# Patient Record
Sex: MALE | Race: WHITE | Employment: OTHER | URBAN - METROPOLITAN AREA
[De-identification: names, ages, dates, MRNs, and addresses within clinical notes are randomized per-mention and may not be internally consistent; named-entity substitution may affect disease eponyms.]

---

## 2017-08-11 ENCOUNTER — HOSPITAL ENCOUNTER (OUTPATIENT)
Dept: GENERAL RADIOLOGY | Age: 67
Discharge: HOME | End: 2017-08-11
Attending: INTERNAL MEDICINE | Admitting: INTERNAL MEDICINE

## 2017-08-11 LAB
ALBUMIN SERPL-MCNC: 4.3 GM/DL (ref 3.4–5)
ALP BLD-CCNC: 43 IU/L (ref 40–129)
ALT SERPL-CCNC: 15 U/L (ref 10–40)
ANION GAP SERPL CALCULATED.3IONS-SCNC: 14 MMOL/L (ref 4–16)
AST SERPL-CCNC: 20 IU/L (ref 15–37)
BACTERIA: NEGATIVE /HPF
BASOPHILS ABSOLUTE: 0 K/CU MM
BASOPHILS RELATIVE PERCENT: 0.4 % (ref 0–1)
BILIRUB SERPL-MCNC: 0.3 MG/DL (ref 0–1)
BILIRUBIN URINE: NEGATIVE MG/DL
BLOOD, URINE: NEGATIVE
BUN BLDV-MCNC: 15 MG/DL (ref 6–23)
CALCIUM SERPL-MCNC: 9.5 MG/DL (ref 8.3–10.6)
CHLORIDE BLD-SCNC: 101 MMOL/L (ref 99–110)
CHOLESTEROL: 209 MG/DL
CLARITY: CLEAR
CO2: 26 MMOL/L (ref 21–32)
COLOR: YELLOW
CREAT SERPL-MCNC: 1.1 MG/DL (ref 0.9–1.3)
DIFFERENTIAL TYPE: ABNORMAL
EOSINOPHILS ABSOLUTE: 0.1 K/CU MM
EOSINOPHILS RELATIVE PERCENT: 1.3 % (ref 0–3)
GFR AFRICAN AMERICAN: >60 ML/MIN/1.73M2
GFR NON-AFRICAN AMERICAN: >60 ML/MIN/1.73M2
GLUCOSE FASTING: 95 MG/DL (ref 70–99)
GLUCOSE, URINE: NEGATIVE MG/DL
HCT VFR BLD CALC: 41.8 % (ref 42–52)
HDLC SERPL-MCNC: 67 MG/DL
HEMOGLOBIN: 14.3 GM/DL (ref 13.5–18)
IMMATURE NEUTROPHIL %: 0.1 % (ref 0–0.43)
KETONES, URINE: NEGATIVE MG/DL
LDL CHOLESTEROL DIRECT: 124 MG/DL
LEUKOCYTE ESTERASE, URINE: NEGATIVE
LYMPHOCYTES ABSOLUTE: 3 K/CU MM
LYMPHOCYTES RELATIVE PERCENT: 42.4 % (ref 24–44)
MCH RBC QN AUTO: 31.9 PG (ref 27–31)
MCHC RBC AUTO-ENTMCNC: 34.2 % (ref 32–36)
MCV RBC AUTO: 93.3 FL (ref 78–100)
MONOCYTES ABSOLUTE: 0.5 K/CU MM
MONOCYTES RELATIVE PERCENT: 6.7 % (ref 0–4)
MUCUS: ABNORMAL HPF
NITRITE URINE, QUANTITATIVE: NEGATIVE
NUCLEATED RBC %: 0 %
PDW BLD-RTO: 13.2 % (ref 11.7–14.9)
PH, URINE: 5 (ref 5–8)
PLATELET # BLD: 194 K/CU MM (ref 140–440)
PMV BLD AUTO: 11.7 FL (ref 7.5–11.1)
POTASSIUM SERPL-SCNC: 4.2 MMOL/L (ref 3.5–5.1)
PROTEIN UA: NEGATIVE MG/DL
RBC # BLD: 4.48 M/CU MM (ref 4.6–6.2)
RBC URINE: <1 /HPF (ref 0–3)
SEGMENTED NEUTROPHILS ABSOLUTE COUNT: 3.5 K/CU MM
SEGMENTED NEUTROPHILS RELATIVE PERCENT: 49.1 % (ref 36–66)
SODIUM BLD-SCNC: 141 MMOL/L (ref 135–145)
SPECIFIC GRAVITY UA: 1.02 (ref 1–1.03)
T3 FREE: 2.8 PG/ML (ref 2.3–4.2)
T4 FREE: 1.15 NG/DL (ref 0.9–1.8)
TOTAL IMMATURE NEUTOROPHIL: 0.01 K/CU MM
TOTAL NUCLEATED RBC: 0 K/CU MM
TOTAL PROTEIN: 6.6 GM/DL (ref 6.4–8.2)
TRIGL SERPL-MCNC: 222 MG/DL
UROBILINOGEN, URINE: NORMAL MG/DL (ref 0.2–1)
WBC # BLD: 7.1 K/CU MM (ref 4–10.5)
WBC UA: <1 /HPF (ref 0–2)

## 2020-03-06 ENCOUNTER — APPOINTMENT (OUTPATIENT)
Dept: GENERAL RADIOLOGY | Age: 70
DRG: 286 | End: 2020-03-06
Payer: MEDICARE

## 2020-03-06 ENCOUNTER — APPOINTMENT (OUTPATIENT)
Dept: ULTRASOUND IMAGING | Age: 70
DRG: 286 | End: 2020-03-06
Payer: MEDICARE

## 2020-03-06 ENCOUNTER — APPOINTMENT (OUTPATIENT)
Dept: CT IMAGING | Age: 70
DRG: 286 | End: 2020-03-06
Payer: MEDICARE

## 2020-03-06 ENCOUNTER — HOSPITAL ENCOUNTER (INPATIENT)
Age: 70
LOS: 5 days | Discharge: HOME | DRG: 286 | End: 2020-03-11
Attending: EMERGENCY MEDICINE | Admitting: INTERNAL MEDICINE
Payer: MEDICARE

## 2020-03-06 PROBLEM — I48.91 ATRIAL FIBRILLATION WITH RVR (HCC): Status: ACTIVE | Noted: 2020-03-06

## 2020-03-06 LAB
ALBUMIN SERPL-MCNC: 3.8 GM/DL (ref 3.4–5)
ALP BLD-CCNC: 56 IU/L (ref 40–129)
ALT SERPL-CCNC: 48 U/L (ref 10–40)
ANION GAP SERPL CALCULATED.3IONS-SCNC: 16 MMOL/L (ref 4–16)
AST SERPL-CCNC: 55 IU/L (ref 15–37)
BACTERIA: ABNORMAL /HPF
BASOPHILS ABSOLUTE: 0 K/CU MM
BASOPHILS ABSOLUTE: 0 K/CU MM
BASOPHILS RELATIVE PERCENT: 0.2 % (ref 0–1)
BASOPHILS RELATIVE PERCENT: 0.3 % (ref 0–1)
BILIRUB SERPL-MCNC: 1.2 MG/DL (ref 0–1)
BILIRUBIN URINE: NEGATIVE MG/DL
BLOOD, URINE: ABNORMAL
BUN BLDV-MCNC: 37 MG/DL (ref 6–23)
CALCIUM SERPL-MCNC: 9.3 MG/DL (ref 8.3–10.6)
CAST TYPE: ABNORMAL /HPF
CHLORIDE BLD-SCNC: 101 MMOL/L (ref 99–110)
CLARITY: CLEAR
CO2: 21 MMOL/L (ref 21–32)
COLOR: ABNORMAL
COMMENT UA: ABNORMAL
CREAT SERPL-MCNC: 1.4 MG/DL (ref 0.9–1.3)
CRYSTAL TYPE: ABNORMAL /HPF
DIFFERENTIAL TYPE: ABNORMAL
DIFFERENTIAL TYPE: ABNORMAL
EOSINOPHILS ABSOLUTE: 0 K/CU MM
EOSINOPHILS ABSOLUTE: 0 K/CU MM
EOSINOPHILS RELATIVE PERCENT: 0 % (ref 0–3)
EOSINOPHILS RELATIVE PERCENT: 0 % (ref 0–3)
EPITHELIAL CELLS, UA: 3 /HPF
GFR AFRICAN AMERICAN: >60 ML/MIN/1.73M2
GFR NON-AFRICAN AMERICAN: 50 ML/MIN/1.73M2
GLUCOSE BLD-MCNC: 112 MG/DL (ref 70–99)
GLUCOSE, URINE: NEGATIVE MG/DL
HCT VFR BLD CALC: 47.1 % (ref 42–52)
HCT VFR BLD CALC: 47.2 % (ref 42–52)
HEMOGLOBIN: 15 GM/DL (ref 13.5–18)
HEMOGLOBIN: 15.5 GM/DL (ref 13.5–18)
IMMATURE NEUTROPHIL %: 0.2 % (ref 0–0.43)
IMMATURE NEUTROPHIL %: 0.3 % (ref 0–0.43)
INR BLD: 1.46 INDEX
KETONES, URINE: NEGATIVE MG/DL
LEUKOCYTE ESTERASE, URINE: NEGATIVE
LIPASE: 38 IU/L (ref 13–60)
LYMPHOCYTES ABSOLUTE: 1.6 K/CU MM
LYMPHOCYTES ABSOLUTE: 1.7 K/CU MM
LYMPHOCYTES RELATIVE PERCENT: 15.4 % (ref 24–44)
LYMPHOCYTES RELATIVE PERCENT: 17.9 % (ref 24–44)
MCH RBC QN AUTO: 30.1 PG (ref 27–31)
MCH RBC QN AUTO: 30.3 PG (ref 27–31)
MCHC RBC AUTO-ENTMCNC: 31.8 % (ref 32–36)
MCHC RBC AUTO-ENTMCNC: 32.8 % (ref 32–36)
MCV RBC AUTO: 91.7 FL (ref 78–100)
MCV RBC AUTO: 95.2 FL (ref 78–100)
MONOCYTES ABSOLUTE: 0.7 K/CU MM
MONOCYTES ABSOLUTE: 0.7 K/CU MM
MONOCYTES RELATIVE PERCENT: 7.1 % (ref 0–4)
MONOCYTES RELATIVE PERCENT: 7.6 % (ref 0–4)
NITRITE URINE, QUANTITATIVE: NEGATIVE
NUCLEATED RBC %: 0 %
PDW BLD-RTO: 15.1 % (ref 11.7–14.9)
PDW BLD-RTO: 15.1 % (ref 11.7–14.9)
PH, URINE: 6 (ref 5–8)
PLATELET # BLD: 156 K/CU MM (ref 140–440)
PLATELET # BLD: 161 K/CU MM (ref 140–440)
PMV BLD AUTO: 12.1 FL (ref 7.5–11.1)
PMV BLD AUTO: 12.5 FL (ref 7.5–11.1)
POTASSIUM SERPL-SCNC: 4.6 MMOL/L (ref 3.5–5.1)
PRO-BNP: ABNORMAL PG/ML
PROTEIN UA: 300 MG/DL
PROTHROMBIN TIME: 17.7 SECONDS (ref 11.7–14.5)
RBC # BLD: 4.95 M/CU MM (ref 4.6–6.2)
RBC # BLD: 5.15 M/CU MM (ref 4.6–6.2)
RBC URINE: 20 /HPF (ref 0–3)
SEGMENTED NEUTROPHILS ABSOLUTE COUNT: 7 K/CU MM
SEGMENTED NEUTROPHILS ABSOLUTE COUNT: 8 K/CU MM
SEGMENTED NEUTROPHILS RELATIVE PERCENT: 74.1 % (ref 36–66)
SEGMENTED NEUTROPHILS RELATIVE PERCENT: 76.9 % (ref 36–66)
SODIUM BLD-SCNC: 138 MMOL/L (ref 135–145)
SPECIFIC GRAVITY UA: 1.03 (ref 1–1.03)
TOTAL IMMATURE NEUTOROPHIL: 0.02 K/CU MM
TOTAL IMMATURE NEUTOROPHIL: 0.03 K/CU MM
TOTAL NUCLEATED RBC: 0 K/CU MM
TOTAL PROTEIN: 6.5 GM/DL (ref 6.4–8.2)
TROPONIN T: <0.01 NG/ML
TROPONIN T: <0.01 NG/ML
UROBILINOGEN, URINE: 0.2 MG/DL (ref 0.2–1)
WBC # BLD: 10.5 K/CU MM (ref 4–10.5)
WBC # BLD: 9.4 K/CU MM (ref 4–10.5)
WBC UA: 5 /HPF (ref 0–2)

## 2020-03-06 PROCEDURE — 80048 BASIC METABOLIC PNL TOTAL CA: CPT

## 2020-03-06 PROCEDURE — 2500000003 HC RX 250 WO HCPCS: Performed by: EMERGENCY MEDICINE

## 2020-03-06 PROCEDURE — 2500000003 HC RX 250 WO HCPCS: Performed by: INTERNAL MEDICINE

## 2020-03-06 PROCEDURE — 80053 COMPREHEN METABOLIC PANEL: CPT

## 2020-03-06 PROCEDURE — 83880 ASSAY OF NATRIURETIC PEPTIDE: CPT

## 2020-03-06 PROCEDURE — 6360000004 HC RX CONTRAST MEDICATION: Performed by: EMERGENCY MEDICINE

## 2020-03-06 PROCEDURE — 93005 ELECTROCARDIOGRAM TRACING: CPT | Performed by: EMERGENCY MEDICINE

## 2020-03-06 PROCEDURE — 96376 TX/PRO/DX INJ SAME DRUG ADON: CPT

## 2020-03-06 PROCEDURE — 84484 ASSAY OF TROPONIN QUANT: CPT

## 2020-03-06 PROCEDURE — 85025 COMPLETE CBC W/AUTO DIFF WBC: CPT

## 2020-03-06 PROCEDURE — 6360000002 HC RX W HCPCS: Performed by: EMERGENCY MEDICINE

## 2020-03-06 PROCEDURE — 83690 ASSAY OF LIPASE: CPT

## 2020-03-06 PROCEDURE — 71045 X-RAY EXAM CHEST 1 VIEW: CPT

## 2020-03-06 PROCEDURE — 96365 THER/PROPH/DIAG IV INF INIT: CPT

## 2020-03-06 PROCEDURE — 85610 PROTHROMBIN TIME: CPT

## 2020-03-06 PROCEDURE — 93970 EXTREMITY STUDY: CPT

## 2020-03-06 PROCEDURE — 96375 TX/PRO/DX INJ NEW DRUG ADDON: CPT

## 2020-03-06 PROCEDURE — 2140000000 HC CCU INTERMEDIATE R&B

## 2020-03-06 PROCEDURE — 81001 URINALYSIS AUTO W/SCOPE: CPT

## 2020-03-06 PROCEDURE — 6370000000 HC RX 637 (ALT 250 FOR IP): Performed by: INTERNAL MEDICINE

## 2020-03-06 PROCEDURE — 2580000003 HC RX 258: Performed by: EMERGENCY MEDICINE

## 2020-03-06 PROCEDURE — 99285 EMERGENCY DEPT VISIT HI MDM: CPT

## 2020-03-06 PROCEDURE — 83735 ASSAY OF MAGNESIUM: CPT

## 2020-03-06 PROCEDURE — 84443 ASSAY THYROID STIM HORMONE: CPT

## 2020-03-06 PROCEDURE — 2580000003 HC RX 258: Performed by: INTERNAL MEDICINE

## 2020-03-06 PROCEDURE — 6360000002 HC RX W HCPCS: Performed by: INTERNAL MEDICINE

## 2020-03-06 PROCEDURE — 71275 CT ANGIOGRAPHY CHEST: CPT

## 2020-03-06 RX ORDER — DILTIAZEM HYDROCHLORIDE 5 MG/ML
10 INJECTION INTRAVENOUS ONCE
Status: COMPLETED | OUTPATIENT
Start: 2020-03-06 | End: 2020-03-06

## 2020-03-06 RX ORDER — POLYETHYLENE GLYCOL 3350 17 G/17G
17 POWDER, FOR SOLUTION ORAL DAILY
Status: DISCONTINUED | OUTPATIENT
Start: 2020-03-07 | End: 2020-03-11 | Stop reason: HOSPADM

## 2020-03-06 RX ORDER — FUROSEMIDE 10 MG/ML
INJECTION INTRAMUSCULAR; INTRAVENOUS
Status: DISPENSED
Start: 2020-03-06 | End: 2020-03-07

## 2020-03-06 RX ORDER — ACETAMINOPHEN 325 MG/1
650 TABLET ORAL EVERY 6 HOURS PRN
Status: DISCONTINUED | OUTPATIENT
Start: 2020-03-06 | End: 2020-03-11 | Stop reason: HOSPADM

## 2020-03-06 RX ORDER — POLYETHYLENE GLYCOL 3350 17 G/17G
17 POWDER, FOR SOLUTION ORAL DAILY PRN
Status: DISCONTINUED | OUTPATIENT
Start: 2020-03-06 | End: 2020-03-11 | Stop reason: HOSPADM

## 2020-03-06 RX ORDER — TAMSULOSIN HYDROCHLORIDE 0.4 MG/1
0.4 CAPSULE ORAL DAILY
Status: DISCONTINUED | OUTPATIENT
Start: 2020-03-07 | End: 2020-03-11 | Stop reason: HOSPADM

## 2020-03-06 RX ORDER — BISACODYL 10 MG
10 SUPPOSITORY, RECTAL RECTAL DAILY PRN
Status: DISCONTINUED | OUTPATIENT
Start: 2020-03-06 | End: 2020-03-11 | Stop reason: HOSPADM

## 2020-03-06 RX ORDER — 0.9 % SODIUM CHLORIDE 0.9 %
500 INTRAVENOUS SOLUTION INTRAVENOUS ONCE
Status: COMPLETED | OUTPATIENT
Start: 2020-03-06 | End: 2020-03-06

## 2020-03-06 RX ORDER — FUROSEMIDE 10 MG/ML
40 INJECTION INTRAMUSCULAR; INTRAVENOUS ONCE
Status: COMPLETED | OUTPATIENT
Start: 2020-03-06 | End: 2020-03-06

## 2020-03-06 RX ORDER — SODIUM CHLORIDE 0.9 % (FLUSH) 0.9 %
10 SYRINGE (ML) INJECTION EVERY 12 HOURS SCHEDULED
Status: DISCONTINUED | OUTPATIENT
Start: 2020-03-06 | End: 2020-03-11 | Stop reason: HOSPADM

## 2020-03-06 RX ORDER — FUROSEMIDE 10 MG/ML
40 INJECTION INTRAMUSCULAR; INTRAVENOUS EVERY 12 HOURS
Status: DISCONTINUED | OUTPATIENT
Start: 2020-03-06 | End: 2020-03-07

## 2020-03-06 RX ORDER — ONDANSETRON 2 MG/ML
4 INJECTION INTRAMUSCULAR; INTRAVENOUS EVERY 6 HOURS PRN
Status: DISCONTINUED | OUTPATIENT
Start: 2020-03-06 | End: 2020-03-11 | Stop reason: HOSPADM

## 2020-03-06 RX ORDER — PROMETHAZINE HYDROCHLORIDE 25 MG/1
12.5 TABLET ORAL EVERY 6 HOURS PRN
Status: DISCONTINUED | OUTPATIENT
Start: 2020-03-06 | End: 2020-03-11 | Stop reason: HOSPADM

## 2020-03-06 RX ORDER — LACTULOSE 10 G/15ML
30 SOLUTION ORAL
Status: COMPLETED | OUTPATIENT
Start: 2020-03-06 | End: 2020-03-07

## 2020-03-06 RX ORDER — SODIUM CHLORIDE 0.9 % (FLUSH) 0.9 %
10 SYRINGE (ML) INJECTION PRN
Status: DISCONTINUED | OUTPATIENT
Start: 2020-03-06 | End: 2020-03-11 | Stop reason: HOSPADM

## 2020-03-06 RX ORDER — ACETAMINOPHEN 650 MG/1
650 SUPPOSITORY RECTAL EVERY 6 HOURS PRN
Status: DISCONTINUED | OUTPATIENT
Start: 2020-03-06 | End: 2020-03-11 | Stop reason: HOSPADM

## 2020-03-06 RX ORDER — PANTOPRAZOLE SODIUM 40 MG/10ML
40 INJECTION, POWDER, LYOPHILIZED, FOR SOLUTION INTRAVENOUS DAILY
Status: DISCONTINUED | OUTPATIENT
Start: 2020-03-07 | End: 2020-03-11 | Stop reason: HOSPADM

## 2020-03-06 RX ADMIN — LACTULOSE 30 G: 10 SOLUTION ORAL at 22:53

## 2020-03-06 RX ADMIN — SODIUM CHLORIDE, PRESERVATIVE FREE 10 ML: 5 INJECTION INTRAVENOUS at 22:28

## 2020-03-06 RX ADMIN — DEXTROSE MONOHYDRATE 5 MG/HR: 50 INJECTION, SOLUTION INTRAVENOUS at 22:28

## 2020-03-06 RX ADMIN — DILTIAZEM HYDROCHLORIDE 10 MG: 5 INJECTION INTRAVENOUS at 17:46

## 2020-03-06 RX ADMIN — DILTIAZEM HYDROCHLORIDE 10 MG: 5 INJECTION INTRAVENOUS at 16:33

## 2020-03-06 RX ADMIN — ENOXAPARIN SODIUM 100 MG: 100 INJECTION SUBCUTANEOUS at 22:28

## 2020-03-06 RX ADMIN — FUROSEMIDE 40 MG: 10 INJECTION, SOLUTION INTRAMUSCULAR; INTRAVENOUS at 17:40

## 2020-03-06 RX ADMIN — FUROSEMIDE 40 MG: 10 INJECTION, SOLUTION INTRAMUSCULAR; INTRAVENOUS at 22:31

## 2020-03-06 RX ADMIN — SODIUM CHLORIDE 500 ML: 9 INJECTION, SOLUTION INTRAVENOUS at 16:53

## 2020-03-06 RX ADMIN — IOPAMIDOL 100 ML: 755 INJECTION, SOLUTION INTRAVENOUS at 17:23

## 2020-03-06 RX ADMIN — CEFTRIAXONE 1 G: 1 INJECTION, POWDER, FOR SOLUTION INTRAMUSCULAR; INTRAVENOUS at 17:43

## 2020-03-06 NOTE — ED PROVIDER NOTES
Triage Chief Complaint:   Leg Swelling (bilat )      [place]:  Yulissa Cao is a 78 y.o. male that presents for:    Chief complaint:  Lower extremity swelling    Location:  BiLateral    Quality/Characteristic:  Pitting. Duration:  Aggressive over the past 2 weeks    Aggravating/Alleviating factors:  4 days ago saw his physician assistant and was started on BPH medication. No history of prior DVT, PE. No history of acute renal failure or chronic kidney disease. Associated symptoms:  Has chest pain, shortness of breath, palpitations. Severity:  No pain at the current time. Review of medical records:  No old records for review. ROS:    Constitutional: No fevers/chills. No weight changes. No night sweats. Eyes:  No blurry vision or double vision. No drainage. Ears, Nose, Throat:  No hearing changes. No rhinitis. No sore throat or cough. Cardiac: + Chest pain. No arm/jaw pain. + palpitations. No lightheadedness. No claudication symptoms. Respiratory: + Short of breath. No hemoptysis. GI: No abdominal pain. No n/v/d. No hematochezia or melena. :  + Decreased urine output as above. MSK:  No joint pain or swelling.  + Bilateral lower extremity swelling. Skin:  No rashes. No pruritis. Neuro:  No numbness, tingling or weakness in any extremity or face. No headache. No incoordination. No speech difficulties. No seizures. At least 10 point systems reviewed and otherwise acutely negative except as in the [address] Past Medical History:   Diagnosis Date    Enlarged prostate      Past Surgical History:   Procedure Laterality Date    ARM SURGERY       History reviewed. No pertinent family history.   Social History     Socioeconomic History    Marital status:      Spouse name: Not on file    Number of children: Not on file    Years of education: Not on file    Highest education level: Not on file   Occupational History    Not on file   Social Needs    Financial supple, no neck tenderness, normal ROM, no JVD  BACK: no back tenderness. no CVA tenderness  LUNGS: no wheezing, no rales, no rhonchi, no accessory muscle use. good air exchange bilateral.  HEART: Tachycardic and irregularly irregular rate on exam and also on monitor, no murmur, no rub. ABDOMEN: normal appearance, normal BS, soft, no abd tenderness, no guarding, no organomegaly, however the patient does have bladder fullness. RECTAL: deffered  EXTREMITIES: no joint swelling\tenderness, normal ROM. Lateral pitting edema from the proximal third of the leg distally. He has strong DP and PT pulses which are symmetric, normal capillary refill. Compartments are soft that his legs are warm to touch. SKIN: warm, dry, good color, no rash. NEURO: Alert and oriented, motor intact, sensory intact.     I have reviewed and interpreted all of the currently available lab results from this visit (if applicable):  Results for orders placed or performed during the hospital encounter of 03/06/20   CBC Auto Differential   Result Value Ref Range    WBC 9.4 4.0 - 10.5 K/CU MM    RBC 5.15 4.6 - 6.2 M/CU MM    Hemoglobin 15.5 13.5 - 18.0 GM/DL    Hematocrit 47.2 42 - 52 %    MCV 91.7 78 - 100 FL    MCH 30.1 27 - 31 PG    MCHC 32.8 32.0 - 36.0 %    RDW 15.1 (H) 11.7 - 14.9 %    Platelets 776 574 - 487 K/CU MM    MPV 12.1 (H) 7.5 - 11.1 FL    Differential Type AUTOMATED DIFFERENTIAL     Segs Relative 74.1 (H) 36 - 66 %    Lymphocytes % 17.9 (L) 24 - 44 %    Monocytes % 7.6 (H) 0 - 4 %    Eosinophils % 0.0 0 - 3 %    Basophils % 0.2 0 - 1 %    Segs Absolute 7.0 K/CU MM    Lymphocytes Absolute 1.7 K/CU MM    Monocytes Absolute 0.7 K/CU MM    Eosinophils Absolute 0.0 K/CU MM    Basophils Absolute 0.0 K/CU MM    Immature Neutrophil % 0.2 0 - 0.43 %    Total Immature Neutrophil 0.02 K/CU MM   Comprehensive Metabolic Panel w/ Reflex to MG   Result Value Ref Range    Sodium 138 135 - 145 MMOL/L    Potassium 4.6 3.5 - 5.1 MMOL/L    Chloride 101 99 - 110 mMol/L    CO2 21 21 - 32 MMOL/L    BUN 37 (H) 6 - 23 MG/DL    CREATININE 1.4 (H) 0.9 - 1.3 MG/DL    Glucose 112 (H) 70 - 99 MG/DL    Calcium 9.3 8.3 - 10.6 MG/DL    Alb 3.8 3.4 - 5.0 GM/DL    Total Protein 6.5 6.4 - 8.2 GM/DL    Total Bilirubin 1.2 (H) 0.0 - 1.0 MG/DL    ALT 48 (H) 10 - 40 U/L    AST 55 (H) 15 - 37 IU/L    Alkaline Phosphatase 56 40 - 129 IU/L    GFR Non- 50 (L) >60 mL/min/1.73m2    GFR African American >60 >60 mL/min/1.73m2    Anion Gap 16 4 - 16   Lipase   Result Value Ref Range    Lipase 38 13 - 60 IU/L   Troponin   Result Value Ref Range    Troponin T <0.010 <0.01 NG/ML   Urinalysis, reflex to microscopic   Result Value Ref Range    Color, UA DARK YELLOW (A) YELLOW    Clarity, UA CLEAR CLEAR    Glucose, Urine NEGATIVE NEGATIVE MG/DL    Bilirubin Urine NEGATIVE NEGATIVE MG/DL    Ketones, Urine NEGATIVE NEGATIVE MG/DL    Specific Gravity, UA 1.030 1.001 - 1.035    Blood, Urine LARGE NUMBER OR AMOUNT OBSERVED (A) NEGATIVE    pH, Urine 6.0 5.0 - 8.0    Protein,  (HH) NEGATIVE MG/DL    Urobilinogen, Urine 0.2 0.2 - 1.0 MG/DL    Nitrite Urine, Quantitative NEGATIVE NEGATIVE    Leukocyte Esterase, Urine NEGATIVE NEGATIVE    RBC, UA 20 (H) 0 - 3 /HPF    WBC, UA 5 (H) 0 - 2 /HPF    Epithelial Cells, UA 3 /HPF    Cast Type NO CAST FORMS SEEN NO CAST FORMS SEEN /HPF    Bacteria, UA MODERATE NUMBER OR AMOUNT OBSERVED (A) NEGATIVE /HPF    Crystal Type NO CELLS SEEN (A) NEGATIVE /HPF    Urinalysis Comments MUCUS PRESENT    Brain Natriuretic Peptide   Result Value Ref Range    Pro-BNP 10,332 (H) <300 PG/ML        Radiographs:  [] Radiologist's Report Reviewed:   VL DUP LOWER EXTREMITY VENOUS BILATERAL   Final Result   No evidence of DVT in either lower extremity. CTA PULMONARY W CONTRAST   Final Result   No evidence of pulmonary embolism or acute pulmonary abnormality.       Bilateral pleural effusions more prominent on the right than on the left with   overlying atelectatic changes. XR CHEST PORTABLE   Final Result   1. Bilateral pleural effusions right greater than left with associated   airspace disease. Follow-up PA and lateral chest x-ray to resolution is   recommended. 2. Cardiomegaly without overt failure. EKG: (All EKG's are interpreted by myself in the absence of a cardiologist)  EKG INTERPRETATION:  I have personally reviewed and interpreted this EKG independently and find it to reveal:  12 lead EKG, irregularly irregular tachyarrhythmia, Rate 156, , no STEMI. No old for comparison. MDM:    4:12 PM as noted above in the physical exam the patient is found to be in atrial fibrillation and rapid ventricular rate, he has bilateral pitting edema, he has bladder fullness I will perform a bedside bladder scan, he is active chest pain and shortness of breath, he will need continuous cardio dynamic monitoring. Patient will be moved to resuscitation room immediately adjacent to the provider desk. We will obtain stat labs to include a troponin as well as BNP. Given the duration of the patient's symptoms certainly both DVT, and PE are within the differential, as well as acute renal failure, and cardiac ischemia. Will plan for transfer once stabilized. 4:24 PM Mendiola has been placed. Bedside ultrasound revealed bladder volume greater than 1000 mL. Please see procedure note as above. EKG is atrial fibrillation and RVR as dictated above. I have asked the imaging tech to call in the ultrasonographer for stat bilateral duplex ultrasounds. I will also slight the patient for a CTA of the chest however will need to evaluate his renal function prior. 4:40 PM repeat BP after first 10 mg push of diltiazem is 114/62. Heart rate is still labile however is downtrending, has gone down to 114. Patient tolerated well without any immediate complications or side effects.   I did asked that the x-ray tech come emergently to the patient's room to complete the chest x-ray. 4:46 PM GFR 50, likely secondary to obstructive uropathy and therefore the Mendiola has been placed, more than a liter has come out, will bolus the patient with 500 mL of normal saline given he will be slated for CT of the chest with contrast.  X-ray tech at bedside. Heart rate still labile however continues to be downtrending, now 110.    5:31 PM troponin is negative, BNP is positive to the thousands, Lasix ordered IV, also the patient has a moderate amount of bacteria in his urine, given this was a Mendiola specimen and that he has been retaining we will treat him with ceftriaxone and also send this for culture. We are awaiting the CTA results, the patient will be slated for admission. I spoken to the patient about his facility of choice, he preferred to go to [facility].  We will also dose a second diltiazem 10 mg.    5:45 PM Dipak Castillo RN at the [facility] has taken report. Awaiting hospitalist callback. 6:28 PM no PE, no DVT. Patient has sustained a rate controlled state around 100, still somewhat labile. This was all discussed with the hospitalist who does agree that the patient is appropriate for inpatient status with telemetry to the PCU. A bed is available and has been assigned. Awaiting transfer. ------------------------------------------------        CRITICAL CARE TIME: 46 minutes of critical care time was provided to this patient. Emergent measures were taken to aggressively manage the potential of multi--end organ damage secondary to atrial fibrillation and rapid ventricular rate, congestive heart failure. Time includes delineation of circumstances and medical past history from collateral history providers, arranging for admission and consultation by subspecialty providers, including hospitalist who is accepted admission and recommends inpatient status to PCU with telemetry.       Total critical care time documented does not include time spent on separately billed procedures. Final Impression:  1. Bilateral leg edema    2. Shortness of breath    3. Atrial fibrillation, unspecified type [facility])        Discharge referral (if applicable):  Nick Davenport MD  [address] [facility]  [phone]            Discharge medications (if applicable):  New Prescriptions    No medications on file       (Please note that portions of this note may have been completed with a voice recognition program. Efforts were made to edit the dictations but occasionally words are mis-transcribed.)    MD Maricruz Ramirez MD  03/09/20 [id number]

## 2020-03-07 LAB
ANION GAP SERPL CALCULATED.3IONS-SCNC: 16 MMOL/L (ref 4–16)
BUN BLDV-MCNC: 33 MG/DL (ref 6–23)
CALCIUM SERPL-MCNC: 8.7 MG/DL (ref 8.3–10.6)
CHLORIDE BLD-SCNC: 100 MMOL/L (ref 99–110)
CO2: 21 MMOL/L (ref 21–32)
CREAT SERPL-MCNC: 1.4 MG/DL (ref 0.9–1.3)
CREATININE URINE: 22.2 MG/DL (ref 39–259)
GFR AFRICAN AMERICAN: >60 ML/MIN/1.73M2
GFR NON-AFRICAN AMERICAN: 50 ML/MIN/1.73M2
GLUCOSE BLD-MCNC: 154 MG/DL (ref 70–99)
MAGNESIUM: 2 MG/DL (ref 1.8–2.4)
POTASSIUM SERPL-SCNC: 4.3 MMOL/L (ref 3.5–5.1)
PROT/CREAT RATIO, UR: ABNORMAL
SODIUM BLD-SCNC: 137 MMOL/L (ref 135–145)
T4 FREE: 1.5 NG/DL (ref 0.9–1.8)
TROPONIN T: 0.01 NG/ML
TSH HIGH SENSITIVITY: 4.89 UIU/ML (ref 0.27–4.2)
URINE TOTAL PROTEIN: 32.1 MG/DL

## 2020-03-07 PROCEDURE — 99222 1ST HOSP IP/OBS MODERATE 55: CPT | Performed by: INTERNAL MEDICINE

## 2020-03-07 PROCEDURE — 6370000000 HC RX 637 (ALT 250 FOR IP): Performed by: INTERNAL MEDICINE

## 2020-03-07 PROCEDURE — 84439 ASSAY OF FREE THYROXINE: CPT

## 2020-03-07 PROCEDURE — 82570 ASSAY OF URINE CREATININE: CPT

## 2020-03-07 PROCEDURE — 6360000002 HC RX W HCPCS: Performed by: INTERNAL MEDICINE

## 2020-03-07 PROCEDURE — 36415 COLL VENOUS BLD VENIPUNCTURE: CPT

## 2020-03-07 PROCEDURE — 87086 URINE CULTURE/COLONY COUNT: CPT

## 2020-03-07 PROCEDURE — 2140000000 HC CCU INTERMEDIATE R&B

## 2020-03-07 PROCEDURE — 84484 ASSAY OF TROPONIN QUANT: CPT

## 2020-03-07 PROCEDURE — 2580000003 HC RX 258: Performed by: INTERNAL MEDICINE

## 2020-03-07 PROCEDURE — 84156 ASSAY OF PROTEIN URINE: CPT

## 2020-03-07 PROCEDURE — C9113 INJ PANTOPRAZOLE SODIUM, VIA: HCPCS | Performed by: INTERNAL MEDICINE

## 2020-03-07 RX ORDER — FUROSEMIDE 10 MG/ML
20 INJECTION INTRAMUSCULAR; INTRAVENOUS ONCE
Status: COMPLETED | OUTPATIENT
Start: 2020-03-07 | End: 2020-03-07

## 2020-03-07 RX ORDER — SENNA PLUS 8.6 MG/1
1 TABLET ORAL NIGHTLY PRN
Status: DISCONTINUED | OUTPATIENT
Start: 2020-03-07 | End: 2020-03-11 | Stop reason: HOSPADM

## 2020-03-07 RX ORDER — FUROSEMIDE 10 MG/ML
20 INJECTION INTRAMUSCULAR; INTRAVENOUS EVERY 12 HOURS
Status: DISCONTINUED | OUTPATIENT
Start: 2020-03-07 | End: 2020-03-08

## 2020-03-07 RX ORDER — DIGOXIN 0.25 MG/ML
500 INJECTION INTRAMUSCULAR; INTRAVENOUS ONCE
Status: COMPLETED | OUTPATIENT
Start: 2020-03-07 | End: 2020-03-07

## 2020-03-07 RX ADMIN — ENOXAPARIN SODIUM 100 MG: 100 INJECTION SUBCUTANEOUS at 08:49

## 2020-03-07 RX ADMIN — FUROSEMIDE 20 MG: 10 INJECTION, SOLUTION INTRAMUSCULAR; INTRAVENOUS at 15:30

## 2020-03-07 RX ADMIN — FUROSEMIDE 40 MG: 10 INJECTION, SOLUTION INTRAMUSCULAR; INTRAVENOUS at 10:44

## 2020-03-07 RX ADMIN — LACTULOSE 30 G: 10 SOLUTION ORAL at 02:48

## 2020-03-07 RX ADMIN — TAMSULOSIN HYDROCHLORIDE 0.4 MG: 0.4 CAPSULE ORAL at 08:49

## 2020-03-07 RX ADMIN — ENOXAPARIN SODIUM 100 MG: 100 INJECTION SUBCUTANEOUS at 22:18

## 2020-03-07 RX ADMIN — DIGOXIN 500 MCG: 0.25 INJECTION INTRAMUSCULAR; INTRAVENOUS at 15:30

## 2020-03-07 RX ADMIN — PANTOPRAZOLE SODIUM 40 MG: 40 INJECTION, POWDER, FOR SOLUTION INTRAVENOUS at 08:49

## 2020-03-07 RX ADMIN — SENNOSIDES 8.6 MG: 8.6 TABLET, FILM COATED ORAL at 23:59

## 2020-03-07 RX ADMIN — DILTIAZEM HYDROCHLORIDE 30 MG: 30 TABLET, FILM COATED ORAL at 15:29

## 2020-03-07 RX ADMIN — SODIUM CHLORIDE, PRESERVATIVE FREE 10 ML: 5 INJECTION INTRAVENOUS at 08:49

## 2020-03-07 RX ADMIN — SODIUM CHLORIDE, PRESERVATIVE FREE 10 ML: 5 INJECTION INTRAVENOUS at 22:18

## 2020-03-07 NOTE — PROGRESS NOTES
Hospitalist Progress Note      PCP: JOÃO Tanner    Date of Admission: 3/6/2020    Chief Complaint on Admission: SSOB, urinary retention, ISIDRO, constipation    Pt Seen/Examined and Chart Reviewed. Admitting dx a fib with RVR, acute urinary retention, constipation    SUBJECTIVE:     Moving bowels, tolerating PO, continued mild ISIDRO but improved, no chest pain, arevalo in place      OBJECTIVE:   Vitals    TEMPERATURE:  Current - Temp: 97.8 °F (36.6 °C); Max - Temp  Av.3 °F (36.8 °C)  Min: 97.8 °F (36.6 °C)  Max: 98.7 °F (37.1 °C)  RESPIRATIONS RANGE: Resp  Av  Min: 11  Max: 25  PULSE RANGE: Pulse  Av.7  Min: 89  Max: 131  BLOOD PRESSURE RANGE:  Systolic (39ZDO), TDC:803 , Min:88 , ROJ:672   ; Diastolic (35XCZ), WEB:76, Min:67, Max:93    PULSE OXIMETRY RANGE: SpO2  Av.9 %  Min: 90 %  Max: 98 %  24HR INTAKE/OUTPUT:      Intake/Output Summary (Last 24 hours) at 3/7/2020 1648  Last data filed at 3/7/2020 1340  Gross per 24 hour   Intake 1477.67 ml   Output 5000 ml   Net -3522.33 ml       Exam:    General appearance: Well, no apparent distress, appears stated age and cooperative. HEENT Normal cephalic, atraumatic without obvious deformity. Pupils equal, round, and reactive to light. Extra ocular muscles intact. Conjunctivae/corneas clear. Neck: Supple, No jugular venous distention/bruits. Trachea midline without thyromegaly or adenopathy with full range of motion. Lungs: Clear to ascultation, bilaterally without Rales/Wheezes/Rhonchi with good respiratory effort. Heart: irregularly irregular with Normal S1/S2 without  murmurs, rubs or gallops, point of maximum impulse non-displaced  Abdomen: Soft, non-tender or non-distended without rigidity or guarding and positive bowel sounds all four quadrants. Extremities: No clubbing, cyanosis, or edema bilaterally. Full range of motion without deformity and normal gait intact.   Skin: Skin color, texture, turgor normal. No rashes or

## 2020-03-07 NOTE — CONSULTS
Department of Urology   Consult Note  [facility] 1 2 3 4 5      Date: 3/7/2020   Patient: Matthew Santos   : 1950   DOA: 3/6/2020   MRN: [id number]   ROOM#: [id number]     Reason for Consult:  Acute urinary retention associated with BPH constipation     Acute gross hematuria [facility]    Requesting Physician:  Hospitalist    CHIEF COMPLAINT:  Acute urinary retention and constipation    History Obtained From:  Patient    HISTORY OF PRESENT ILLNESS:                The patient is a 78 y.o. male with significant past medical history of enlarged prostate with acute urinary retention with constipation. Constipation better. On lovenox for A-Fib. Mendiola catheter placed and obtained 1000ml by report. Ooze of blood at meatus. Urine is clearing, light red. No clots. He was seen in urology office . He did not follow up. He has appointment with Dr. Soria Filter 3/11/2020. He is now on Flomax. He denies BPH meds prior to admission.         Past Medical History:        Diagnosis Date    Atrial fibrillation ([address]     Enlarged prostate      Past Surgical History:        Procedure Laterality Date    ARM SURGERY       Current Medications:   Current Facility-Administered Medications: dilTIAZem (CARDIZEM) tablet 30 mg, 30 mg, Oral, 4 times per day  digoxin (LANOXIN) injection 500 mcg, 500 mcg, Intravenous, Once  furosemide (LASIX) injection 20 mg, 20 mg, Intravenous, Once  sodium chloride flush 0.9 % injection 10 mL, 10 mL, Intravenous, 2 times per day  sodium chloride flush 0.9 % injection 10 mL, 10 mL, Intravenous, PRN  acetaminophen (TYLENOL) tablet 650 mg, 650 mg, Oral, Q6H PRN **OR** acetaminophen (TYLENOL) suppository 650 mg, 650 mg, Rectal, Q6H PRN  polyethylene glycol (GLYCOLAX) packet 17 g, 17 g, Oral, Daily PRN  promethazine (PHENERGAN) tablet 12.5 mg, 12.5 mg, Oral, Q6H PRN **OR** ondansetron (ZOFRAN) injection 4 mg, 4 mg, Intravenous, Q6H PRN  dilTIAZem 100 mg in dextrose 5 % 100 mL

## 2020-03-07 NOTE — CONSULTS
hematuria  · Musculoskeletal:  No gait disturbance, weakness or joint complaints  · Integumentary: No rash or pruritis  · Neurological: No TIA or stroke symptoms  · Psychiatric: No anxiety or depression  · Endocrine: No malaise, fatigue or temperature intolerance  · Hematologic/Lymphatic: No bleeding problems, blood clots or swollen lymph nodes  · Allergic/Immunologic: No nasal congestion or hives  All systems negative except as marked. ·   ·      Physical Examination:    Vitals:    03/07/20 0903   BP: 93/68   Pulse: 90   Resp: 19   Temp: 97.8 °F (36.6 °C)   SpO2:       Wt Readings from Last 3 Encounters:   03/06/20 209 lb 11.2 oz (95.1 kg)     Body mass index is 25.53 kg/m². General Appearance:  No distress, conversant    Constitutional:  Well developed, Well nourished, No acute distress, Non-toxic appearance. HENT:  Normocephalic, Atraumatic, Bilateral external ears normal, Oropharynx moist, No oral exudates, Nose normal. Neck- Normal range of motion, No tenderness, Supple, No stridor,no apical-carotid delay, no carotid bruit  Eyes:  PERRL, EOMI, Conjunctiva normal, No discharge. Respiratory:  Normal breath sounds, No respiratory distress, No wheezing, No chest tenderness. ,no use of accessory muscles, diaphragm movement is normal  Cardiovascular: (PMI) apex non displaced,no lifts no thrills, no s3,no s4, Normal heart rate, Normal rhythm, No murmurs, No rubs, No gallops. Carotid arteries pulse and amplitude are normal no bruit, no abdominal bruit noted ( normal abdominal aorta ausculation), femoral arteries pulse and amplitude are normal no bruit, pedal pulses are normal  GI:  Bowel sounds normal, Soft, No tenderness, No masses, No pulsatile masses, no hepatosplenomegally, no bruits  : External genitalia appear normal, No masses or lesions. No discharge. No CVA tenderness. Musculoskeletal:  Intact distal pulses, + edema, No tenderness, No cyanosis, No clubbing.  Good range of motion in all major joints. No tenderness to palpation or major deformities noted. Back- No tenderness. Integument:  Warm, Dry, No erythema, No rash. Skin: no rash, no ulcers  Lymphatic:  No lymphadenopathy noted. Neurologic:  Alert & oriented x 3, Normal motor function, Normal sensory function, No focal deficits noted. Psychiatric:  Affect normal, Judgment normal, Mood normal.   Lab Review   Recent Labs     03/06/20  2310   WBC 10.5   HGB 15.0   HCT 47.1         Recent Labs     03/06/20  2310      K 4.3      CO2 21   BUN 33*   CREATININE 1.4*     Recent Labs     03/06/20  1600   AST 55*   ALT 48*   BILITOT 1.2*   ALKPHOS 56     No results for input(s): TROPONINI in the last 72 hours. No results found for: BNP  Lab Results   Component Value Date    INR 1.46 03/06/2020    PROTIME 17.7 (H) 03/06/2020         EKG:afib    Chest Xray:  1. Bilateral pleural effusions right greater than left with associated   airspace disease.  Follow-up PA and lateral chest x-ray to resolution is   recommended. ECHO:  Labs, echo, meds reviewed  Assessment: 78 y. o.year old with PMH of  has a past medical history of Atrial fibrillation ([address] and Enlarged prostate. Recommendations:  ASA MALLAMPATI:\" 1:2  1. Afib: on cardizem drip, start dig IV and add oral cardizem 30mg q6hrs, taper cardizem drip to dc, echo, DC cardioversion on Monday and stress test on Monday also  2. MILD CHF: lasix added, echo ordered  3. Leg swelling; add lasix, venous doppler  4. Health maintenance: exerise and diet  All labs, medications and tests reviewed, continue all other medications of all above medical condition listed as is.          Nidhi Herndon MD, 3/7/2020 12:13 PM

## 2020-03-07 NOTE — PLAN OF CARE
Problem: SAFETY  Goal: Free from accidental physical injury  Outcome: Ongoing     Problem: DAILY CARE  Goal: Daily care needs are met  Outcome: Ongoing     Problem: SKIN INTEGRITY  Goal: Skin integrity is maintained or improved  Outcome: Ongoing

## 2020-03-07 NOTE — H&P
History and Physical      Name:  Lily Johnson /Age/Sex: 1950  (78 y.o. male)   MRN & CSN:  [id number] & [id number] Admission Date/Time: 3/6/2020  3:49 PM   Location:  [address] [place] [address]- PCP: Claus Scott Day: 1    Assessment and Plan:   Lily Johnson is a 78 y.o.  male  who presents with urinary retention and constipation    -Urinary retention probably acute/subacute likely due to BPH status post Mendiola catheter placement in the ER  -Atrial fibrillation with rapid ventricular response  -Acute on chronic congestive heart failure, unspecified type no previous echo  -Acute kidney injury, creatinine 1.4 mildly elevated likely due to CHF and urinary retention  -Proteinuria  -Constipation  Plan  Telemetry  Diltiazem drip  Cardiology consult  2D echo  Lasix 40 mg IV twice daily  Lovenox 1 mg/kg subcutaneously twice daily  Consult urology  Flomax  Obtain TSH  Urine protein to creatinine ratio  Laxatives  Diet DIET CARDIAC;   DVT Prophylaxis [] Lovenox, []  Heparin, [] SCDs, [] Ambulation   GI Prophylaxis [] PPI,  [] H2 Blocker,  [] Carafate,  [] Diet/Tube Feeds   Code Status Full Code   Disposition Patient requires continued admission due to    MDM [] Low, [] Moderate,[]  High  Patient's risk as above due to      History of Present Illness:     Chief Complaint: Urinary retention and constipation  Lily Johnson is a 78 y.o.  male  who presents with urine retention and constipation. Patient presented to the emergency room with worsening obstructive urinary symptoms and constipation. He had no symptoms over the past 4 days. He had difficulty urination straining hesitancy intermittency and weak stream.  His last bowel movement was earlier this morning however was small bowel movement. No abdominal pain, nausea or vomiting. No fever or chills. He had poor appetite. He had worsening shortness of breath over the past week exertional and intermittent palpitations. No chest pain.   Was

## 2020-03-08 ENCOUNTER — APPOINTMENT (OUTPATIENT)
Dept: GENERAL RADIOLOGY | Age: 70
DRG: 286 | End: 2020-03-08
Payer: MEDICARE

## 2020-03-08 LAB
ANION GAP SERPL CALCULATED.3IONS-SCNC: 14 MMOL/L (ref 4–16)
BASOPHILS ABSOLUTE: 0 K/CU MM
BASOPHILS RELATIVE PERCENT: 0.3 % (ref 0–1)
BUN BLDV-MCNC: 25 MG/DL (ref 6–23)
CALCIUM SERPL-MCNC: 8.9 MG/DL (ref 8.3–10.6)
CHLORIDE BLD-SCNC: 99 MMOL/L (ref 99–110)
CO2: 27 MMOL/L (ref 21–32)
CREAT SERPL-MCNC: 1.5 MG/DL (ref 0.9–1.3)
CULTURE: NORMAL
DIFFERENTIAL TYPE: ABNORMAL
EOSINOPHILS ABSOLUTE: 0 K/CU MM
EOSINOPHILS RELATIVE PERCENT: 0.3 % (ref 0–3)
GFR AFRICAN AMERICAN: 56 ML/MIN/1.73M2
GFR NON-AFRICAN AMERICAN: 46 ML/MIN/1.73M2
GLUCOSE BLD-MCNC: 102 MG/DL (ref 70–99)
HCT VFR BLD CALC: 49.7 % (ref 42–52)
HEMOGLOBIN: 16 GM/DL (ref 13.5–18)
IMMATURE NEUTROPHIL %: 0.3 % (ref 0–0.43)
LYMPHOCYTES ABSOLUTE: 3.2 K/CU MM
LYMPHOCYTES RELATIVE PERCENT: 27.6 % (ref 24–44)
Lab: NORMAL
MAGNESIUM: 2 MG/DL (ref 1.8–2.4)
MCH RBC QN AUTO: 30.1 PG (ref 27–31)
MCHC RBC AUTO-ENTMCNC: 32.2 % (ref 32–36)
MCV RBC AUTO: 93.4 FL (ref 78–100)
MONOCYTES ABSOLUTE: 0.7 K/CU MM
MONOCYTES RELATIVE PERCENT: 6 % (ref 0–4)
NUCLEATED RBC %: 0 %
PDW BLD-RTO: 14.9 % (ref 11.7–14.9)
PLATELET # BLD: 180 K/CU MM (ref 140–440)
PMV BLD AUTO: 12.7 FL (ref 7.5–11.1)
POTASSIUM SERPL-SCNC: 4.2 MMOL/L (ref 3.5–5.1)
RBC # BLD: 5.32 M/CU MM (ref 4.6–6.2)
SEGMENTED NEUTROPHILS ABSOLUTE COUNT: 7.5 K/CU MM
SEGMENTED NEUTROPHILS RELATIVE PERCENT: 65.5 % (ref 36–66)
SODIUM BLD-SCNC: 140 MMOL/L (ref 135–145)
SPECIMEN: NORMAL
TOTAL IMMATURE NEUTOROPHIL: 0.03 K/CU MM
TOTAL NUCLEATED RBC: 0 K/CU MM
WBC # BLD: 11.4 K/CU MM (ref 4–10.5)

## 2020-03-08 PROCEDURE — 94761 N-INVAS EAR/PLS OXIMETRY MLT: CPT

## 2020-03-08 PROCEDURE — 6370000000 HC RX 637 (ALT 250 FOR IP): Performed by: INTERNAL MEDICINE

## 2020-03-08 PROCEDURE — 83735 ASSAY OF MAGNESIUM: CPT

## 2020-03-08 PROCEDURE — 6360000002 HC RX W HCPCS: Performed by: INTERNAL MEDICINE

## 2020-03-08 PROCEDURE — 2580000003 HC RX 258: Performed by: INTERNAL MEDICINE

## 2020-03-08 PROCEDURE — 36415 COLL VENOUS BLD VENIPUNCTURE: CPT

## 2020-03-08 PROCEDURE — 2140000000 HC CCU INTERMEDIATE R&B

## 2020-03-08 PROCEDURE — 71045 X-RAY EXAM CHEST 1 VIEW: CPT

## 2020-03-08 PROCEDURE — C9113 INJ PANTOPRAZOLE SODIUM, VIA: HCPCS | Performed by: INTERNAL MEDICINE

## 2020-03-08 PROCEDURE — 99233 SBSQ HOSP IP/OBS HIGH 50: CPT | Performed by: INTERNAL MEDICINE

## 2020-03-08 PROCEDURE — 6370000000 HC RX 637 (ALT 250 FOR IP): Performed by: UROLOGY

## 2020-03-08 PROCEDURE — 80048 BASIC METABOLIC PNL TOTAL CA: CPT

## 2020-03-08 PROCEDURE — 85025 COMPLETE CBC W/AUTO DIFF WBC: CPT

## 2020-03-08 PROCEDURE — 93010 ELECTROCARDIOGRAM REPORT: CPT | Performed by: INTERNAL MEDICINE

## 2020-03-08 RX ORDER — FUROSEMIDE 20 MG/1
20 TABLET ORAL DAILY
Status: DISCONTINUED | OUTPATIENT
Start: 2020-03-08 | End: 2020-03-09

## 2020-03-08 RX ORDER — FINASTERIDE 5 MG/1
5 TABLET, FILM COATED ORAL DAILY
Status: DISCONTINUED | OUTPATIENT
Start: 2020-03-08 | End: 2020-03-11 | Stop reason: HOSPADM

## 2020-03-08 RX ORDER — DIGOXIN 0.25 MG/ML
250 INJECTION INTRAMUSCULAR; INTRAVENOUS EVERY 6 HOURS
Status: COMPLETED | OUTPATIENT
Start: 2020-03-08 | End: 2020-03-08

## 2020-03-08 RX ORDER — DILTIAZEM HCL 90 MG
90 TABLET ORAL EVERY 6 HOURS SCHEDULED
Status: DISCONTINUED | OUTPATIENT
Start: 2020-03-08 | End: 2020-03-09

## 2020-03-08 RX ORDER — DILTIAZEM HYDROCHLORIDE 60 MG/1
60 TABLET, FILM COATED ORAL EVERY 6 HOURS SCHEDULED
Status: DISCONTINUED | OUTPATIENT
Start: 2020-03-08 | End: 2020-03-08

## 2020-03-08 RX ADMIN — ENOXAPARIN SODIUM 100 MG: 100 INJECTION SUBCUTANEOUS at 08:38

## 2020-03-08 RX ADMIN — PANTOPRAZOLE SODIUM 40 MG: 40 INJECTION, POWDER, FOR SOLUTION INTRAVENOUS at 08:38

## 2020-03-08 RX ADMIN — FUROSEMIDE 20 MG: 20 TABLET ORAL at 11:54

## 2020-03-08 RX ADMIN — SODIUM CHLORIDE, PRESERVATIVE FREE 10 ML: 5 INJECTION INTRAVENOUS at 20:37

## 2020-03-08 RX ADMIN — SODIUM CHLORIDE, PRESERVATIVE FREE 10 ML: 5 INJECTION INTRAVENOUS at 08:39

## 2020-03-08 RX ADMIN — DILTIAZEM HYDROCHLORIDE 90 MG: 90 TABLET, FILM COATED ORAL at 20:36

## 2020-03-08 RX ADMIN — FINASTERIDE 5 MG: 5 TABLET, FILM COATED ORAL at 11:54

## 2020-03-08 RX ADMIN — ENOXAPARIN SODIUM 100 MG: 100 INJECTION SUBCUTANEOUS at 20:36

## 2020-03-08 RX ADMIN — SENNOSIDES 8.6 MG: 8.6 TABLET, FILM COATED ORAL at 20:36

## 2020-03-08 RX ADMIN — DIGOXIN 250 MCG: 0.25 INJECTION INTRAMUSCULAR; INTRAVENOUS at 16:28

## 2020-03-08 RX ADMIN — TAMSULOSIN HYDROCHLORIDE 0.4 MG: 0.4 CAPSULE ORAL at 08:38

## 2020-03-08 RX ADMIN — FUROSEMIDE 20 MG: 10 INJECTION, SOLUTION INTRAMUSCULAR; INTRAVENOUS at 08:38

## 2020-03-08 RX ADMIN — DIGOXIN 250 MCG: 0.25 INJECTION INTRAMUSCULAR; INTRAVENOUS at 11:55

## 2020-03-08 RX ADMIN — DILTIAZEM HYDROCHLORIDE 30 MG: 30 TABLET, FILM COATED ORAL at 08:38

## 2020-03-08 ASSESSMENT — PAIN SCALES - GENERAL
PAINLEVEL_OUTOF10: 0
PAINLEVEL_OUTOF10: 0

## 2020-03-08 NOTE — PROGRESS NOTES
abdominal aorta ausculation), femoral arteries pulse and amplitude are normal no bruit, pedal pulses are normal  GI:  Bowel sounds normal, Soft, No tenderness, No masses, No pulsatile masses. : External genitalia appear normal, No masses or lesions. No discharge. No CVA tenderness. Musculoskeletal:  Intact distal pulses, No edema, No tenderness, No cyanosis, No clubbing. Good range of motion in all major joints. No tenderness to palpation or major deformities noted. Back- No tenderness. Integument:  Warm, Dry, No erythema, No rash. Lymphatic:  No lymphadenopathy noted. Neurologic:  Alert & oriented x 3, Normal motor function, Normal sensory function, No focal deficits noted. Psychiatric:  Affect normal, Judgment normal, Mood normal.     Medications:    dilTIAZem  60 mg Oral 4 times per day    digoxin  250 mcg Intravenous Q6H    furosemide  20 mg Oral Daily    sodium chloride flush  10 mL Intravenous 2 times per day    enoxaparin  1 mg/kg Subcutaneous BID    pantoprazole  40 mg Intravenous Daily    tamsulosin  0.4 mg Oral Daily    polyethylene glycol  17 g Oral Daily      dilTIAZem Stopped (03/07/20 1530)     senna, sodium chloride flush, acetaminophen **OR** acetaminophen, polyethylene glycol, promethazine **OR** ondansetron, bisacodyl    Lab Data:  CBC:   Recent Labs     03/06/20  1600 03/06/20 2310 03/08/20  0630   WBC 9.4 10.5 11.4*   HGB 15.5 15.0 16.0   HCT 47.2 47.1 49.7   MCV 91.7 95.2 93.4    156 180     BMP:   Recent Labs     03/06/20  1600 03/06/20 2310 03/08/20  0630    137 140   K 4.6 4.3 4.2    100 99   CO2 21 21 27   BUN 37* 33* 25*   CREATININE 1.4* 1.4* 1.5*     LIVER PROFILE:   Recent Labs     03/06/20  1600   AST 55*   ALT 48*   LIPASE 38   BILITOT 1.2*   ALKPHOS 56     PT/INR:   Recent Labs     03/06/20 2310   PROTIME 17.7*   INR 1.46     APTT: No results for input(s): APTT in the last 72 hours.   BNP:  No results for input(s): BNP in the last 72

## 2020-03-08 NOTE — PROGRESS NOTES
Hospitalist Progress Note      PCP: JOÃO Green    Date of Admission: 3/6/2020    Chief Complaint on Admission: SSOB, urinary retention, ISIDRO, constipation    Pt Seen/Examined and Chart Reviewed. Admitting dx a fib with RVR, acute urinary retention, constipation    SUBJECTIVE:     Moving bowels, tolerating PO, arevalo in place, 's with exertion, lightheaded and ISIDRO, a fib with RVR, 's at rest a fib      OBJECTIVE:   Vitals    TEMPERATURE:  Current - Temp: 98.1 °F (36.7 °C); Max - Temp  Av.2 °F (36.8 °C)  Min: 98 °F (36.7 °C)  Max: 98.6 °F (37 °C)  RESPIRATIONS RANGE: Resp  Av  Min: 15  Max: 25  PULSE RANGE: Pulse  Av.9  Min: 85  Max: 132  BLOOD PRESSURE RANGE:  Systolic (87AKE), YYB:439 , Min:82 , WUP:972   ; Diastolic (50SCW), NLY:64, Min:59, Max:99    PULSE OXIMETRY RANGE: SpO2  Av.3 %  Min: 95 %  Max: 98 %  24HR INTAKE/OUTPUT:      Intake/Output Summary (Last 24 hours) at 3/8/2020 1520  Last data filed at 3/8/2020 1690  Gross per 24 hour   Intake 720 ml   Output 1250 ml   Net -530 ml       Exam:    General appearance: Well, no apparent distress, appears stated age and cooperative. HEENT Normal cephalic, atraumatic without obvious deformity. Pupils equal, round, and reactive to light. Extra ocular muscles intact. Conjunctivae/corneas clear. Neck: Supple, No jugular venous distention/bruits. Trachea midline without thyromegaly or adenopathy with full range of motion. Lungs: Clear to ascultation, bilaterally without Rales/Wheezes/Rhonchi with good respiratory effort. Heart: irregularly irregular with Normal S1/S2 without  murmurs, rubs or gallops, point of maximum impulse non-displaced  Abdomen: Soft, non-tender or non-distended without rigidity or guarding and positive bowel sounds all four quadrants. Extremities: No clubbing, cyanosis, or edema bilaterally. Full range of motion without deformity and normal gait intact.   Skin: Skin color, texture, turgor normal.

## 2020-03-09 LAB
ANION GAP SERPL CALCULATED.3IONS-SCNC: 12 MMOL/L (ref 4–16)
BASOPHILS ABSOLUTE: 0 K/CU MM
BASOPHILS RELATIVE PERCENT: 0.3 % (ref 0–1)
BUN BLDV-MCNC: 19 MG/DL (ref 6–23)
CALCIUM SERPL-MCNC: 8.3 MG/DL (ref 8.3–10.6)
CHLORIDE BLD-SCNC: 99 MMOL/L (ref 99–110)
CO2: 29 MMOL/L (ref 21–32)
CREAT SERPL-MCNC: 1.3 MG/DL (ref 0.9–1.3)
DIFFERENTIAL TYPE: ABNORMAL
EOSINOPHILS ABSOLUTE: 0.1 K/CU MM
EOSINOPHILS RELATIVE PERCENT: 0.5 % (ref 0–3)
GFR AFRICAN AMERICAN: >60 ML/MIN/1.73M2
GFR NON-AFRICAN AMERICAN: 55 ML/MIN/1.73M2
GLUCOSE BLD-MCNC: 83 MG/DL (ref 70–99)
HCT VFR BLD CALC: 46.8 % (ref 42–52)
HEMOGLOBIN: 15 GM/DL (ref 13.5–18)
IMMATURE NEUTROPHIL %: 0.2 % (ref 0–0.43)
LV EF: 10 %
LV EF: 15 %
LVEF MODALITY: NORMAL
LVEF MODALITY: NORMAL
LYMPHOCYTES ABSOLUTE: 1.7 K/CU MM
LYMPHOCYTES RELATIVE PERCENT: 17.4 % (ref 24–44)
MAGNESIUM: 1.8 MG/DL (ref 1.8–2.4)
MCH RBC QN AUTO: 30.1 PG (ref 27–31)
MCHC RBC AUTO-ENTMCNC: 32.1 % (ref 32–36)
MCV RBC AUTO: 94 FL (ref 78–100)
MONOCYTES ABSOLUTE: 0.7 K/CU MM
MONOCYTES RELATIVE PERCENT: 7.2 % (ref 0–4)
NUCLEATED RBC %: 0 %
PDW BLD-RTO: 15 % (ref 11.7–14.9)
PLATELET # BLD: 169 K/CU MM (ref 140–440)
PMV BLD AUTO: 11.9 FL (ref 7.5–11.1)
POTASSIUM SERPL-SCNC: 4.3 MMOL/L (ref 3.5–5.1)
RBC # BLD: 4.98 M/CU MM (ref 4.6–6.2)
SEGMENTED NEUTROPHILS ABSOLUTE COUNT: 7.3 K/CU MM
SEGMENTED NEUTROPHILS RELATIVE PERCENT: 74.4 % (ref 36–66)
SODIUM BLD-SCNC: 140 MMOL/L (ref 135–145)
TOTAL IMMATURE NEUTOROPHIL: 0.02 K/CU MM
TOTAL NUCLEATED RBC: 0 K/CU MM
WBC # BLD: 9.8 K/CU MM (ref 4–10.5)

## 2020-03-09 PROCEDURE — 6360000004 HC RX CONTRAST MEDICATION: Performed by: INTERNAL MEDICINE

## 2020-03-09 PROCEDURE — 36415 COLL VENOUS BLD VENIPUNCTURE: CPT

## 2020-03-09 PROCEDURE — B24BZZ4 ULTRASONOGRAPHY OF HEART WITH AORTA, TRANSESOPHAGEAL: ICD-10-PCS | Performed by: INTERNAL MEDICINE

## 2020-03-09 PROCEDURE — 99233 SBSQ HOSP IP/OBS HIGH 50: CPT | Performed by: INTERNAL MEDICINE

## 2020-03-09 PROCEDURE — 6370000000 HC RX 637 (ALT 250 FOR IP): Performed by: INTERNAL MEDICINE

## 2020-03-09 PROCEDURE — 93325 DOPPLER ECHO COLOR FLOW MAPG: CPT | Performed by: INTERNAL MEDICINE

## 2020-03-09 PROCEDURE — 2580000003 HC RX 258: Performed by: INTERNAL MEDICINE

## 2020-03-09 PROCEDURE — 6370000000 HC RX 637 (ALT 250 FOR IP): Performed by: UROLOGY

## 2020-03-09 PROCEDURE — 93312 ECHO TRANSESOPHAGEAL: CPT

## 2020-03-09 PROCEDURE — 80048 BASIC METABOLIC PNL TOTAL CA: CPT

## 2020-03-09 PROCEDURE — 2140000000 HC CCU INTERMEDIATE R&B

## 2020-03-09 PROCEDURE — 7100000001 HC PACU RECOVERY - ADDTL 15 MIN

## 2020-03-09 PROCEDURE — 6360000002 HC RX W HCPCS: Performed by: INTERNAL MEDICINE

## 2020-03-09 PROCEDURE — 92960 CARDIOVERSION ELECTRIC EXT: CPT

## 2020-03-09 PROCEDURE — 93312 ECHO TRANSESOPHAGEAL: CPT | Performed by: INTERNAL MEDICINE

## 2020-03-09 PROCEDURE — 83735 ASSAY OF MAGNESIUM: CPT

## 2020-03-09 PROCEDURE — 93306 TTE W/DOPPLER COMPLETE: CPT

## 2020-03-09 PROCEDURE — 7100000000 HC PACU RECOVERY - FIRST 15 MIN

## 2020-03-09 PROCEDURE — 85025 COMPLETE CBC W/AUTO DIFF WBC: CPT

## 2020-03-09 RX ORDER — DIGOXIN 125 MCG
125 TABLET ORAL DAILY
Status: DISCONTINUED | OUTPATIENT
Start: 2020-03-09 | End: 2020-03-11 | Stop reason: HOSPADM

## 2020-03-09 RX ORDER — DILTIAZEM HYDROCHLORIDE 60 MG/1
120 TABLET, FILM COATED ORAL EVERY 6 HOURS SCHEDULED
Status: DISCONTINUED | OUTPATIENT
Start: 2020-03-09 | End: 2020-03-09

## 2020-03-09 RX ORDER — METOPROLOL SUCCINATE 50 MG/1
100 TABLET, EXTENDED RELEASE ORAL DAILY
Status: DISCONTINUED | OUTPATIENT
Start: 2020-03-09 | End: 2020-03-11 | Stop reason: HOSPADM

## 2020-03-09 RX ADMIN — DILTIAZEM HYDROCHLORIDE 90 MG: 90 TABLET, FILM COATED ORAL at 14:38

## 2020-03-09 RX ADMIN — SODIUM CHLORIDE, PRESERVATIVE FREE 10 ML: 5 INJECTION INTRAVENOUS at 12:28

## 2020-03-09 RX ADMIN — SODIUM CHLORIDE, PRESERVATIVE FREE 10 ML: 5 INJECTION INTRAVENOUS at 20:44

## 2020-03-09 RX ADMIN — SENNOSIDES 8.6 MG: 8.6 TABLET, FILM COATED ORAL at 20:44

## 2020-03-09 RX ADMIN — PERFLUTREN 2 MG: 6.52 INJECTION, SUSPENSION INTRAVENOUS at 10:43

## 2020-03-09 RX ADMIN — FINASTERIDE 5 MG: 5 TABLET, FILM COATED ORAL at 12:25

## 2020-03-09 RX ADMIN — TAMSULOSIN HYDROCHLORIDE 0.4 MG: 0.4 CAPSULE ORAL at 12:33

## 2020-03-09 RX ADMIN — DILTIAZEM HYDROCHLORIDE 90 MG: 90 TABLET, FILM COATED ORAL at 12:24

## 2020-03-09 RX ADMIN — ENOXAPARIN SODIUM 100 MG: 100 INJECTION SUBCUTANEOUS at 12:25

## 2020-03-09 RX ADMIN — FUROSEMIDE 20 MG: 20 TABLET ORAL at 12:24

## 2020-03-09 RX ADMIN — DIGOXIN 125 MCG: 125 TABLET ORAL at 14:15

## 2020-03-09 ASSESSMENT — PAIN SCALES - GENERAL
PAINLEVEL_OUTOF10: 0
PAINLEVEL_OUTOF10: 0

## 2020-03-09 NOTE — CARE COORDINATION
LSW spoke with pt about discharge plans. Pt lives with family. Pt has a PCP and can afford his meds. Pt denies any DME or HC. Pt stated he is independent of all his ADLs.   Pt denies any needs and plans home

## 2020-03-09 NOTE — PROGRESS NOTES
Hospitalist Progress Note      PCP: JOÃO High    Date of Admission: 3/6/2020    Chief Complaint on Admission: SSOB, urinary retention, ISIDRO, constipation    Pt Seen/Examined and Chart Reviewed. Admitting dx a fib with RVR, acute urinary retention, constipation    SUBJECTIVE:     Moving bowels, tolerating PO, arevalo in place, 's with exertion, lightheaded and ISIDRO, a fib with RVR, HR 90's at rest a fib      OBJECTIVE:   Vitals    TEMPERATURE:  Current - Temp: 97.6 °F (36.4 °C); Max - Temp  Av °F (36.7 °C)  Min: 97.6 °F (36.4 °C)  Max: 98.5 °F (36.9 °C)  RESPIRATIONS RANGE: Resp  Av.6  Min: 10  Max: 26  PULSE RANGE: Pulse  Av.3  Min: 85  Max: 127  BLOOD PRESSURE RANGE:  Systolic (22BGD), OOW:494 , Min:90 , GNU:399   ; Diastolic (54SRX), VQU:33, Min:72, Max:99    PULSE OXIMETRY RANGE: SpO2  Av.2 %  Min: 94 %  Max: 98 %  24HR INTAKE/OUTPUT:      Intake/Output Summary (Last 24 hours) at 3/9/2020 1405  Last data filed at 3/9/2020 8171  Gross per 24 hour   Intake --   Output 2200 ml   Net -2200 ml       Exam:    General appearance: Well, no apparent distress, appears stated age and cooperative. HEENT Normal cephalic, atraumatic without obvious deformity. Pupils equal, round, and reactive to light. Extra ocular muscles intact. Conjunctivae/corneas clear. Neck: Supple, No jugular venous distention/bruits. Trachea midline without thyromegaly or adenopathy with full range of motion. Lungs: Clear to ascultation, bilaterally without Rales/Wheezes/Rhonchi with good respiratory effort. Heart: irregularly irregular with Normal S1/S2 without  murmurs, rubs or gallops, point of maximum impulse non-displaced  Abdomen: Soft, non-tender or non-distended without rigidity or guarding and positive bowel sounds all four quadrants. Extremities: No clubbing, cyanosis, or edema bilaterally. Full range of motion without deformity and normal gait intact.   Skin: Skin color, texture, turgor normal.

## 2020-03-09 NOTE — PROGRESS NOTES
Pt continuing in afib/flutter. Overnight, continues to be rate controlled, however, his HR has been as low as high 30s and frequently in the 40s. JOÃO Mace aware and ordered to hold 3am dose of Cardizem. Will make cardiology aware near shift change.

## 2020-03-09 NOTE — PROGRESS NOTES
Today's plan: SEEMA showed ALEJANDRA clot, no cardioversion performed, LVEF is severely depressed, will do C tomorrow, case discussed with patient        Admit Date:  3/6/2020    Subjective:better      Chief complaints on admission  Chief Complaint   Patient presents with    Leg Swelling     bilat          History of present illness:Pranav is a 78 y. o.year old who  presents with had concerns including Leg Swelling (bilat ). Past medical history:    has a past medical history of Atrial fibrillation ([address] and Enlarged prostate. Past surgical history:   has a past surgical history that includes Arm Surgery. Social History:   reports that he has never smoked. He has never used smokeless tobacco. He reports previous alcohol use. Family history:none    No Known Allergies      Objective:   /83   Pulse 127   Temp 97.6 °F (36.4 °C) (Oral)   Resp 20   Ht 6' 4\" (1.93 m)   Wt 196 lb 3.2 oz (89 kg)   SpO2 98%   BMI 23.88 kg/m²       Intake/Output Summary (Last 24 hours) at 3/9/2020 4957  Last data filed at 3/9/2020 1788  Gross per 24 hour   Intake 360 ml   Output 2200 ml   Net -1840 ml       TELEMETRY: Atrial fibrillation     Physical Exam:  Constitutional:  Well developed, Well nourished, No acute distress, Non-toxic appearance. HENT:  Normocephalic, Atraumatic, Bilateral external ears normal, Oropharynx moist, No oral exudates, Nose normal. Neck- Normal range of motion, No tenderness, Supple, No stridor. Eyes:  PERRL, EOMI, Conjunctiva normal, No discharge. Respiratory:  Normal breath sounds, No respiratory distress, No wheezing, No chest tenderness. ,no use of accessory muscles, diaphragm movement is normal  Cardiovascular: (PMI) apex non displaced,no lifts no thrills, no s3,no s4, Normal heart rate, Normal rhythm, No murmurs, No rubs, No gallops.  Carotid arteries pulse and amplitude are normal no bruit, no abdominal bruit noted ( normal abdominal aorta ausculation), femoral arteries pulse and amplitude are normal no bruit, pedal pulses are normal  GI:  Bowel sounds normal, Soft, No tenderness, No masses, No pulsatile masses. : External genitalia appear normal, No masses or lesions. No discharge. No CVA tenderness. Musculoskeletal:  Intact distal pulses, No edema, No tenderness, No cyanosis, No clubbing. Good range of motion in all major joints. No tenderness to palpation or major deformities noted. Back- No tenderness. Integument:  Warm, Dry, No erythema, No rash. Lymphatic:  No lymphadenopathy noted. Neurologic:  Alert & oriented x 3, Normal motor function, Normal sensory function, No focal deficits noted. Psychiatric:  Affect normal, Judgment normal, Mood normal.     Medications:    furosemide  20 mg Oral Daily    finasteride  5 mg Oral Daily    dilTIAZem  90 mg Oral 4 times per day    sodium chloride flush  10 mL Intravenous 2 times per day    enoxaparin  1 mg/kg Subcutaneous BID    pantoprazole  40 mg Intravenous Daily    tamsulosin  0.4 mg Oral Daily    polyethylene glycol  17 g Oral Daily       senna, sodium chloride flush, acetaminophen **OR** acetaminophen, polyethylene glycol, promethazine **OR** ondansetron, bisacodyl    Lab Data:  CBC:   Recent Labs     03/06/20 2310 03/08/20  0630 03/09/20  0549   WBC 10.5 11.4* 9.8   HGB 15.0 16.0 15.0   HCT 47.1 49.7 46.8   MCV 95.2 93.4 94.0    180 169     BMP:   Recent Labs     03/06/20 2310 03/08/20  0630 03/09/20  0549    140 140   K 4.3 4.2 4.3    99 99   CO2 21 27 29   BUN 33* 25* 19   CREATININE 1.4* 1.5* 1.3     LIVER PROFILE:   Recent Labs     03/06/20  1600   AST 55*   ALT 48*   LIPASE 38   BILITOT 1.2*   ALKPHOS 56     PT/INR:   Recent Labs     03/06/20 2310   PROTIME 17.7*   INR 1.46     APTT: No results for input(s): APTT in the last 72 hours. BNP:  No results for input(s): BNP in the last 72 hours. TROPONIN: @TROPONINI:3@      Assessment:  78 y. o.year old who is admitted for          Plan:  1.  Afib: off cardizem drip, start dig oral, switch cardizem to lopressor,  2. MILD CHF: lasix added, echo ordered  Leg swelling; add lasix, venous dopplerHealth maintenance: exerise and diet  All labs, medications and tests reviewed, continue all other medications of all above medical condition listed as is.       Aiyana Whittington MD 3/9/2020 7:52 AM

## 2020-03-10 LAB
ANION GAP SERPL CALCULATED.3IONS-SCNC: 12 MMOL/L (ref 4–16)
BASOPHILS ABSOLUTE: 0 K/CU MM
BASOPHILS RELATIVE PERCENT: 0.2 % (ref 0–1)
BUN BLDV-MCNC: 17 MG/DL (ref 6–23)
CALCIUM SERPL-MCNC: 8.3 MG/DL (ref 8.3–10.6)
CHLORIDE BLD-SCNC: 100 MMOL/L (ref 99–110)
CHOLESTEROL: 150 MG/DL
CO2: 24 MMOL/L (ref 21–32)
CREAT SERPL-MCNC: 1.1 MG/DL (ref 0.9–1.3)
DIFFERENTIAL TYPE: ABNORMAL
EOSINOPHILS ABSOLUTE: 0.1 K/CU MM
EOSINOPHILS RELATIVE PERCENT: 1.1 % (ref 0–3)
GFR AFRICAN AMERICAN: >60 ML/MIN/1.73M2
GFR NON-AFRICAN AMERICAN: >60 ML/MIN/1.73M2
GLUCOSE BLD-MCNC: 100 MG/DL (ref 70–99)
HCT VFR BLD CALC: 45.9 % (ref 42–52)
HDLC SERPL-MCNC: 44 MG/DL
HEMOGLOBIN: 14.9 GM/DL (ref 13.5–18)
IMMATURE NEUTROPHIL %: 0.3 % (ref 0–0.43)
LDL CHOLESTEROL DIRECT: 99 MG/DL
LYMPHOCYTES ABSOLUTE: 1.7 K/CU MM
LYMPHOCYTES RELATIVE PERCENT: 17.7 % (ref 24–44)
MAGNESIUM: 1.8 MG/DL (ref 1.8–2.4)
MCH RBC QN AUTO: 29.9 PG (ref 27–31)
MCHC RBC AUTO-ENTMCNC: 32.5 % (ref 32–36)
MCV RBC AUTO: 92 FL (ref 78–100)
MONOCYTES ABSOLUTE: 0.7 K/CU MM
MONOCYTES RELATIVE PERCENT: 7.4 % (ref 0–4)
NUCLEATED RBC %: 0 %
PDW BLD-RTO: 14.7 % (ref 11.7–14.9)
PLATELET # BLD: 175 K/CU MM (ref 140–440)
PMV BLD AUTO: 11.6 FL (ref 7.5–11.1)
POTASSIUM SERPL-SCNC: 3.8 MMOL/L (ref 3.5–5.1)
RBC # BLD: 4.99 M/CU MM (ref 4.6–6.2)
SEGMENTED NEUTROPHILS ABSOLUTE COUNT: 6.9 K/CU MM
SEGMENTED NEUTROPHILS RELATIVE PERCENT: 73.3 % (ref 36–66)
SODIUM BLD-SCNC: 136 MMOL/L (ref 135–145)
TOTAL IMMATURE NEUTOROPHIL: 0.03 K/CU MM
TOTAL NUCLEATED RBC: 0 K/CU MM
TRIGL SERPL-MCNC: 83 MG/DL
WBC # BLD: 9.3 K/CU MM (ref 4–10.5)

## 2020-03-10 PROCEDURE — 6370000000 HC RX 637 (ALT 250 FOR IP): Performed by: INTERNAL MEDICINE

## 2020-03-10 PROCEDURE — 99233 SBSQ HOSP IP/OBS HIGH 50: CPT | Performed by: INTERNAL MEDICINE

## 2020-03-10 PROCEDURE — 6360000004 HC RX CONTRAST MEDICATION

## 2020-03-10 PROCEDURE — 6360000002 HC RX W HCPCS: Performed by: INTERNAL MEDICINE

## 2020-03-10 PROCEDURE — B2111ZZ FLUOROSCOPY OF MULTIPLE CORONARY ARTERIES USING LOW OSMOLAR CONTRAST: ICD-10-PCS | Performed by: INTERNAL MEDICINE

## 2020-03-10 PROCEDURE — C1894 INTRO/SHEATH, NON-LASER: HCPCS

## 2020-03-10 PROCEDURE — 80061 LIPID PANEL: CPT

## 2020-03-10 PROCEDURE — 2580000003 HC RX 258: Performed by: INTERNAL MEDICINE

## 2020-03-10 PROCEDURE — C9113 INJ PANTOPRAZOLE SODIUM, VIA: HCPCS | Performed by: INTERNAL MEDICINE

## 2020-03-10 PROCEDURE — 85025 COMPLETE CBC W/AUTO DIFF WBC: CPT

## 2020-03-10 PROCEDURE — 80048 BASIC METABOLIC PNL TOTAL CA: CPT

## 2020-03-10 PROCEDURE — 83721 ASSAY OF BLOOD LIPOPROTEIN: CPT

## 2020-03-10 PROCEDURE — 93454 CORONARY ARTERY ANGIO S&I: CPT | Performed by: INTERNAL MEDICINE

## 2020-03-10 PROCEDURE — 2140000000 HC CCU INTERMEDIATE R&B

## 2020-03-10 PROCEDURE — 2580000003 HC RX 258

## 2020-03-10 PROCEDURE — 83735 ASSAY OF MAGNESIUM: CPT

## 2020-03-10 PROCEDURE — 6360000002 HC RX W HCPCS

## 2020-03-10 PROCEDURE — 36415 COLL VENOUS BLD VENIPUNCTURE: CPT

## 2020-03-10 PROCEDURE — 6370000000 HC RX 637 (ALT 250 FOR IP): Performed by: NURSE PRACTITIONER

## 2020-03-10 PROCEDURE — 93454 CORONARY ARTERY ANGIO S&I: CPT

## 2020-03-10 PROCEDURE — 2709999900 HC NON-CHARGEABLE SUPPLY

## 2020-03-10 PROCEDURE — 2500000003 HC RX 250 WO HCPCS

## 2020-03-10 PROCEDURE — APPNB15 APP NON BILLABLE TIME 0-15 MINS: Performed by: NURSE PRACTITIONER

## 2020-03-10 RX ORDER — DOCUSATE SODIUM 100 MG/1
100 CAPSULE, LIQUID FILLED ORAL 2 TIMES DAILY
Status: DISCONTINUED | OUTPATIENT
Start: 2020-03-10 | End: 2020-03-11 | Stop reason: HOSPADM

## 2020-03-10 RX ORDER — LISINOPRIL 2.5 MG/1
2.5 TABLET ORAL DAILY
Status: DISCONTINUED | OUTPATIENT
Start: 2020-03-10 | End: 2020-03-11 | Stop reason: HOSPADM

## 2020-03-10 RX ADMIN — DOCUSATE SODIUM 100 MG: 100 CAPSULE, LIQUID FILLED ORAL at 20:01

## 2020-03-10 RX ADMIN — SENNOSIDES 8.6 MG: 8.6 TABLET, FILM COATED ORAL at 20:01

## 2020-03-10 RX ADMIN — FINASTERIDE 5 MG: 5 TABLET, FILM COATED ORAL at 10:37

## 2020-03-10 RX ADMIN — TAMSULOSIN HYDROCHLORIDE 0.4 MG: 0.4 CAPSULE ORAL at 10:37

## 2020-03-10 RX ADMIN — SODIUM CHLORIDE, PRESERVATIVE FREE 10 ML: 5 INJECTION INTRAVENOUS at 20:01

## 2020-03-10 RX ADMIN — PANTOPRAZOLE SODIUM 40 MG: 40 INJECTION, POWDER, FOR SOLUTION INTRAVENOUS at 10:36

## 2020-03-10 RX ADMIN — POLYETHYLENE GLYCOL (3350) 17 G: 17 POWDER, FOR SOLUTION ORAL at 10:36

## 2020-03-10 RX ADMIN — METOPROLOL SUCCINATE 100 MG: 50 TABLET, EXTENDED RELEASE ORAL at 10:36

## 2020-03-10 RX ADMIN — SODIUM CHLORIDE, PRESERVATIVE FREE 10 ML: 5 INJECTION INTRAVENOUS at 10:37

## 2020-03-10 RX ADMIN — DOCUSATE SODIUM 100 MG: 100 CAPSULE, LIQUID FILLED ORAL at 13:42

## 2020-03-10 RX ADMIN — LISINOPRIL 2.5 MG: 2.5 TABLET ORAL at 16:44

## 2020-03-10 RX ADMIN — APIXABAN 5 MG: 5 TABLET, FILM COATED ORAL at 20:01

## 2020-03-10 RX ADMIN — DIGOXIN 125 MCG: 125 TABLET ORAL at 10:37

## 2020-03-10 ASSESSMENT — PAIN SCALES - GENERAL
PAINLEVEL_OUTOF10: 0
PAINLEVEL_OUTOF10: 0

## 2020-03-10 NOTE — PROGRESS NOTES
Will start eliquis 5 mg BID For ALEJANDRA clot  Also start lisinopril 2.5 mg daily, will uptitrate in the office at follow up   EF 20%  Patient is ok for discharge from cardiology standpoint

## 2020-03-10 NOTE — PROGRESS NOTES
[address]    Hospitalist Progress Note      Name:  Venessa Dougherty /Age/Sex: 1950  (78 y.o. male)   MRN & CSN:  [id number] & [id number] Admission Date/Time: 3/6/2020  3:49 PM   Location:  [address] [place] [address]- PCP: Marisabel Taylor [address] [place] [address] Day: 5    Assessment and Plan:   Venessa Dougherty is a 78 y.o.  male  who presents urinary retention, constipation and found to have atrial fibrillation     Persistent AFIB   ALEJANDRA thrombus    Continue with Eliquis, digoxin, Toprol    Acute urinary retention with hematuria     Hematuria likely from traumatic insertion of arevalo  Urology following -recommending DC with Arevalo    Acute Diastolic and systolic CHF -     Grade III DD on and LVEF 10-20 % on ECHO  Bilateral pleural effusions   Continue with Toprol, lisinopril -   BP may not support other guideline recommended medications    Severe nonischemic CM    S/p LHC - no CAD  May need LifeVest Pending ICD - Defer to cardiology for recommendations    ONEIL - resolved     Chronic conditions   Hypertension  BPH  Constipation    Diet Diet NPO, After Midnight   DVT Prophylaxis [] Lovenox, []  Heparin, [] SCDs, []No VTE prophylaxis, patient ambulating   GI Prophylaxis [] PPI, [] H2 Blocker, [] No GI prophylaxis, patient is receiving diet/Tube Feeds   Code Status Full Code   Disposition Patient requires continued admission due to urinary retention, A. fib   MDM [] Low, [x] Moderate,[]  High     History of Present Illness: Subjective     Patient Seen & Examined at the bedside      Patient is resting in bed with no distress denies any shortness of breath or chest pain    Ten point ROS reviewed negative, unless as noted above    Objective:        Intake/Output Summary (Last 24 hours) at 3/10/2020 1021  Last data filed at 3/10/2020 0606  Gross per 24 hour   Intake --   Output 1500 ml   Net -1500 ml      Vitals:   Vitals:    03/10/20 1000   BP: 114/87   Pulse: 89   Resp: 19   Temp:    SpO2:      Physical Exam:    GEN Awake male, resting in bed in no apparent distress. Appears given age. HENT Mucous membranes are moist.   RESP Clear to auscultation, no wheezes, rales or rhonchi. CARDIO/VASC -S1/S2 auscultated. Regular rate without appreciable murmurs, rubs, or gallops. Peripheral pulses equal bilaterally and palpable. No peripheral edema. GI Abdomen is soft without significant tenderness, masses, or guarding. Bowel sounds are normoactive. Rectal exam deferred.  Mendiola catheter is present. MSK No gross joint deformities. Spontaneous movement of all extremities  SKIN Normal coloration, warm, dry. NEURO Cranial nerves appear grossly intact, normal speech, no lateralizing weakness. PSYCH Awake, alert, oriented x 4. Affect appropriate.     Medications:   Medications:    enoxaparin  1 mg/kg Subcutaneous BID    digoxin  125 mcg Oral Daily    metoprolol succinate  100 mg Oral Daily    finasteride  5 mg Oral Daily    sodium chloride flush  10 mL Intravenous 2 times per day    pantoprazole  40 mg Intravenous Daily    tamsulosin  0.4 mg Oral Daily    polyethylene glycol  17 g Oral Daily      Infusions:   PRN Meds: senna, 1 tablet, Nightly PRN  sodium chloride flush, 10 mL, PRN  acetaminophen, 650 mg, Q6H PRN    Or  acetaminophen, 650 mg, Q6H PRN  polyethylene glycol, 17 g, Daily PRN  promethazine, 12.5 mg, Q6H PRN    Or  ondansetron, 4 mg, Q6H PRN  bisacodyl, 10 mg, Daily PRN          Electronically signed by Ben Cope MD on 3/10/2020 at 10:21 AM

## 2020-03-11 ENCOUNTER — TELEPHONE (OUTPATIENT)
Dept: CARDIOLOGY CLINIC | Age: 70
End: 2020-03-11

## 2020-03-11 VITALS
SYSTOLIC BLOOD PRESSURE: 106 MMHG | BODY MASS INDEX: 23.42 KG/M2 | HEIGHT: 76 IN | HEART RATE: 88 BPM | OXYGEN SATURATION: 96 % | WEIGHT: 192.3 LBS | RESPIRATION RATE: 16 BRPM | TEMPERATURE: 97.4 F | DIASTOLIC BLOOD PRESSURE: 81 MMHG

## 2020-03-11 LAB
ANION GAP SERPL CALCULATED.3IONS-SCNC: 10 MMOL/L (ref 4–16)
BASOPHILS ABSOLUTE: 0 K/CU MM
BASOPHILS RELATIVE PERCENT: 0.3 % (ref 0–1)
BUN BLDV-MCNC: 12 MG/DL (ref 6–23)
CALCIUM SERPL-MCNC: 8.2 MG/DL (ref 8.3–10.6)
CHLORIDE BLD-SCNC: 100 MMOL/L (ref 99–110)
CO2: 23 MMOL/L (ref 21–32)
CREAT SERPL-MCNC: 1.1 MG/DL (ref 0.9–1.3)
DIFFERENTIAL TYPE: ABNORMAL
EKG ATRIAL RATE: 159 BPM
EKG DIAGNOSIS: NORMAL
EKG Q-T INTERVAL: 316 MS
EKG QRS DURATION: 88 MS
EKG QTC CALCULATION (BAZETT): 509 MS
EKG R AXIS: -4 DEGREES
EKG T AXIS: 112 DEGREES
EKG VENTRICULAR RATE: 156 BPM
EOSINOPHILS ABSOLUTE: 0.1 K/CU MM
EOSINOPHILS RELATIVE PERCENT: 1.6 % (ref 0–3)
GFR AFRICAN AMERICAN: >60 ML/MIN/1.73M2
GFR NON-AFRICAN AMERICAN: >60 ML/MIN/1.73M2
GLUCOSE BLD-MCNC: 88 MG/DL (ref 70–99)
HCT VFR BLD CALC: 45.3 % (ref 42–52)
HEMOGLOBIN: 14.8 GM/DL (ref 13.5–18)
IMMATURE NEUTROPHIL %: 0.3 % (ref 0–0.43)
LYMPHOCYTES ABSOLUTE: 1.7 K/CU MM
LYMPHOCYTES RELATIVE PERCENT: 20.9 % (ref 24–44)
MAGNESIUM: 2 MG/DL (ref 1.8–2.4)
MCH RBC QN AUTO: 30.1 PG (ref 27–31)
MCHC RBC AUTO-ENTMCNC: 32.7 % (ref 32–36)
MCV RBC AUTO: 92.3 FL (ref 78–100)
MONOCYTES ABSOLUTE: 0.7 K/CU MM
MONOCYTES RELATIVE PERCENT: 8.2 % (ref 0–4)
NUCLEATED RBC %: 0 %
PDW BLD-RTO: 14.8 % (ref 11.7–14.9)
PLATELET # BLD: 175 K/CU MM (ref 140–440)
PMV BLD AUTO: 11.4 FL (ref 7.5–11.1)
POTASSIUM SERPL-SCNC: 4.1 MMOL/L (ref 3.5–5.1)
RBC # BLD: 4.91 M/CU MM (ref 4.6–6.2)
SEGMENTED NEUTROPHILS ABSOLUTE COUNT: 5.5 K/CU MM
SEGMENTED NEUTROPHILS RELATIVE PERCENT: 68.7 % (ref 36–66)
SODIUM BLD-SCNC: 133 MMOL/L (ref 135–145)
TOTAL IMMATURE NEUTOROPHIL: 0.02 K/CU MM
TOTAL NUCLEATED RBC: 0 K/CU MM
WBC # BLD: 7.9 K/CU MM (ref 4–10.5)

## 2020-03-11 PROCEDURE — 80048 BASIC METABOLIC PNL TOTAL CA: CPT

## 2020-03-11 PROCEDURE — 6370000000 HC RX 637 (ALT 250 FOR IP): Performed by: INTERNAL MEDICINE

## 2020-03-11 PROCEDURE — 83735 ASSAY OF MAGNESIUM: CPT

## 2020-03-11 PROCEDURE — 2580000003 HC RX 258: Performed by: INTERNAL MEDICINE

## 2020-03-11 PROCEDURE — APPSS30 APP SPLIT SHARED TIME 16-30 MINUTES: Performed by: NURSE PRACTITIONER

## 2020-03-11 PROCEDURE — 6360000002 HC RX W HCPCS: Performed by: INTERNAL MEDICINE

## 2020-03-11 PROCEDURE — C9113 INJ PANTOPRAZOLE SODIUM, VIA: HCPCS | Performed by: INTERNAL MEDICINE

## 2020-03-11 PROCEDURE — 85025 COMPLETE CBC W/AUTO DIFF WBC: CPT

## 2020-03-11 PROCEDURE — 6370000000 HC RX 637 (ALT 250 FOR IP): Performed by: NURSE PRACTITIONER

## 2020-03-11 RX ORDER — PSEUDOEPHEDRINE HCL 30 MG
100 TABLET ORAL 2 TIMES DAILY
Qty: 60 CAPSULE | Refills: 1 | Status: SHIPPED | OUTPATIENT
Start: 2020-03-11 | End: 2020-07-31

## 2020-03-11 RX ORDER — POLYETHYLENE GLYCOL 3350 17 G/17G
17 POWDER, FOR SOLUTION ORAL DAILY PRN
Qty: 527 G | Refills: 1 | Status: SHIPPED | OUTPATIENT
Start: 2020-03-11 | End: 2020-04-10

## 2020-03-11 RX ORDER — DIGOXIN 125 MCG
125 TABLET ORAL DAILY
Qty: 30 TABLET | Refills: 3 | Status: SHIPPED | OUTPATIENT
Start: 2020-03-12 | End: 2020-04-06 | Stop reason: SDUPTHER

## 2020-03-11 RX ORDER — FINASTERIDE 5 MG/1
5 TABLET, FILM COATED ORAL DAILY
Qty: 30 TABLET | Refills: 3 | Status: SHIPPED | OUTPATIENT
Start: 2020-03-12 | End: 2020-07-31

## 2020-03-11 RX ORDER — TAMSULOSIN HYDROCHLORIDE 0.4 MG/1
0.4 CAPSULE ORAL DAILY
Qty: 30 CAPSULE | Refills: 3 | Status: ON HOLD | OUTPATIENT
Start: 2020-03-12 | End: 2020-08-07 | Stop reason: HOSPADM

## 2020-03-11 RX ORDER — METOPROLOL SUCCINATE 100 MG/1
100 TABLET, EXTENDED RELEASE ORAL DAILY
Qty: 30 TABLET | Refills: 3 | Status: SHIPPED | OUTPATIENT
Start: 2020-03-12 | End: 2020-04-06 | Stop reason: SDUPTHER

## 2020-03-11 RX ORDER — LISINOPRIL 2.5 MG/1
2.5 TABLET ORAL DAILY
Qty: 30 TABLET | Refills: 3 | Status: SHIPPED | OUTPATIENT
Start: 2020-03-12 | End: 2020-04-06 | Stop reason: SDUPTHER

## 2020-03-11 RX ORDER — FUROSEMIDE 20 MG/1
20 TABLET ORAL
Qty: 60 TABLET | Refills: 3 | Status: SHIPPED | OUTPATIENT
Start: 2020-03-11 | End: 2020-03-16 | Stop reason: ALTCHOICE

## 2020-03-11 RX ADMIN — METOPROLOL SUCCINATE 100 MG: 50 TABLET, EXTENDED RELEASE ORAL at 09:34

## 2020-03-11 RX ADMIN — APIXABAN 5 MG: 5 TABLET, FILM COATED ORAL at 09:35

## 2020-03-11 RX ADMIN — TAMSULOSIN HYDROCHLORIDE 0.4 MG: 0.4 CAPSULE ORAL at 09:34

## 2020-03-11 RX ADMIN — POLYETHYLENE GLYCOL (3350) 17 G: 17 POWDER, FOR SOLUTION ORAL at 09:35

## 2020-03-11 RX ADMIN — PANTOPRAZOLE SODIUM 40 MG: 40 INJECTION, POWDER, FOR SOLUTION INTRAVENOUS at 09:35

## 2020-03-11 RX ADMIN — FINASTERIDE 5 MG: 5 TABLET, FILM COATED ORAL at 09:35

## 2020-03-11 RX ADMIN — SODIUM CHLORIDE, PRESERVATIVE FREE 10 ML: 5 INJECTION INTRAVENOUS at 09:35

## 2020-03-11 RX ADMIN — DIGOXIN 125 MCG: 125 TABLET ORAL at 09:34

## 2020-03-11 RX ADMIN — DOCUSATE SODIUM 100 MG: 100 CAPSULE, LIQUID FILLED ORAL at 09:35

## 2020-03-11 RX ADMIN — LISINOPRIL 2.5 MG: 2.5 TABLET ORAL at 09:34

## 2020-03-11 ASSESSMENT — PAIN SCALES - GENERAL: PAINLEVEL_OUTOF10: 0

## 2020-03-11 NOTE — PLAN OF CARE
Problem: SAFETY  Goal: Free from accidental physical injury  Outcome: Ongoing  Goal: Free from intentional harm  Outcome: Ongoing     Problem: DAILY CARE  Goal: Daily care needs are met  Outcome: Ongoing     Problem: SKIN INTEGRITY  Goal: Skin integrity is maintained or improved  Outcome: Ongoing     Problem: DISCHARGE BARRIERS  Goal: Patient's continuum of care needs are met  Outcome: Ongoing     Problem: Falls - Risk of:  Goal: Will remain free from falls  Description: Will remain free from falls  Outcome: Ongoing  Goal: Absence of physical injury  Description: Absence of physical injury  Outcome: Ongoing

## 2020-03-11 NOTE — DISCHARGE SUMMARY
[address] Hospitalist     Discharge Summary    Name:  Jennifer Bland /Age/Sex: 1950  (78 y.o. male)   MRN & CSN:  [id number] & [id number] Admission Date/Time: 3/6/2020  3:49 PM   Attending:  Lucille Fregoso MD Discharging Physician: Lucille Fregoso MD     HPI:     Please, see admission HPI in [address] and patient's hospital course below    Hospital Course:   Jennifer Bland is a 78 y.o.  male  who presents with constipation and found to have atrial fibrillation  And urinary retention and the following assessments reflect patient's hospital course     Persistent AFIB   ALEJANDRA thrombus     Continue with Eliquis, digoxin, Toprol  Follow up with cardiology as OP      Acute urinary retention with hematuria - improving      Hematuria likely from traumatic insertion of arevalo  On Proscar and Flomax per urology also recommending DC with Arevalo  Urology follow up as OP  -     Acute Diastolic and systolic CHF -      Grade III DD on and LVEF 10-20 % on ECHO  Bilateral pleural effusions   Continue with Toprol, small dose lisinopril - added Lasix every other day   BP may not support other guideline recommended medications     Severe nonischemic CM     S/p LHC - no CAD  Discussed with Dr Deandre Newman who also discussed with EP and advised/decided no need for Life vest      ONEIL - resolved      Chronic conditions   Hypertension  BPH  Constipation    Patient is hemodynamically stable for DC to home     The patient expressed appropriate understanding of and agreement with the discharge recommendations, medications, and plan.      Consults this admission:  IP CONSULT TO [address] TO UROLOGY    Discharge Instruction:   Follow up appointments: Cardiology, Urology   Primary care physician:  within 1 to 2 weeks    Diet:  regular diet   Activity: activity as tolerated  Disposition: Discharged to:   [x]Home, []HHC, []SNF, []Acute Rehab, []Hospice   Condition on discharge: Stable    Discharge Medications:      [address], [address]

## 2020-03-11 NOTE — PROGRESS NOTES
Today's plan: SEEMA showed ALEJANDRA clot, no cardioversion performed, LVEF is severely depressed,NORMAL [facility] today, case discussed with patient, WILL NEED TO START ACE INHIBITOR, START ON ELIQUIS AND WILL DISCUSS FOR LIFE VEST WITH EP        Admit Date:  3/6/2020    Subjective:better      Chief complaints on admission  Chief Complaint   Patient presents with    Leg Swelling     bilat          History of present illness:Pranav is a 78 y. o.year old who  presents with had concerns including Leg Swelling (bilat ). Past medical history:    has a past medical history of Atrial fibrillation ([address] and Enlarged prostate. Past surgical history:   has a past surgical history that includes Arm Surgery. Social History:   reports that he has never smoked. He has never used smokeless tobacco. He reports previous alcohol use. Family history:none    No Known Allergies      Objective:   /82   Pulse 79   Temp 98.3 °F (36.8 °C) (Oral)   Resp 23   Ht 6' 4\" (1.93 m)   Wt 196 lb 4.8 oz (89 kg)   SpO2 96%   BMI 23.89 kg/m²       Intake/Output Summary (Last 24 hours) at 3/10/2020 2005  Last data filed at 3/10/2020 0606  Gross per 24 hour   Intake --   Output 350 ml   Net -350 ml       TELEMETRY: Atrial fibrillation     Physical Exam:  Constitutional:  Well developed, Well nourished, No acute distress, Non-toxic appearance. HENT:  Normocephalic, Atraumatic, Bilateral external ears normal, Oropharynx moist, No oral exudates, Nose normal. Neck- Normal range of motion, No tenderness, Supple, No stridor. Eyes:  PERRL, EOMI, Conjunctiva normal, No discharge. Respiratory:  Normal breath sounds, No respiratory distress, No wheezing, No chest tenderness. ,no use of accessory muscles, diaphragm movement is normal  Cardiovascular: (PMI) apex non displaced,no lifts no thrills, no s3,no s4, Normal heart rate, Normal rhythm, No murmurs, No rubs, No gallops.  Carotid arteries pulse and amplitude are normal no bruit, no abdominal bruit hours.  BNP:  No results for input(s): BNP in the last 72 hours. TROPONIN: @TROPONINI:3@      Assessment:  78 y. o.year old who is admitted for          Plan:  1. Afib: off cardizem drip, on dig oral, switch cardizem to lopressor,  2. MILD CHF: lasix added, echo SHOWED LVEF 20^, ALEJANDRA clot  Leg swelling; add lasix, venous dopplerHealth maintenance: exerise and diet  All labs, medications and tests reviewed, continue all other medications of all above medical condition listed as is.       Mary Jane Cox MD 3/10/2020 8:05 PM

## 2020-03-11 NOTE — TELEPHONE ENCOUNTER
PA submitted for Eliquis via [email or web address]. This medication is covered by the patients insurance plan. Patient should contact Lizet Gilbert or Dominga Zambrano MA at [facility] if he needs further help with medication at the pharmacy once discharged from [facility] as patient may need copay card.

## 2020-03-11 NOTE — PROGRESS NOTES
Today's plan: SEEMA showed ALEJANDRA clot, no cardioversion performed, LVEF is severely depressed, NORMAL LHC (nonishcemic cardiomyopathy), started low does ace inhibitor, continue eliquis, discussed life vest with Dr Nicholas Joe Date:  3/6/2020    Subjective: patient reports he is feeling well. He denies cardiac complaints. I discussed HF and maximization of medications and why it is important and what goal is to be achieved. Patient has questions about side effects of medications as he is not used to taking medications. Questions were answered and patient satisfied with answers. Discussed Life Vest with patient. Discussed recommendations per Dr Ana Mendoza. Will not proceed with Life Vest. Patient agreed to plan. Patient will need to [facility] in the [facility] within 1 week. Chief complaints on admission  Chief Complaint   Patient presents with    Leg Swelling     bilat          History of present illness:Pranav is a 78 y. o.year old who  presents with had concerns including Leg Swelling (bilat ). Past medical history:    has a past medical history of Atrial fibrillation ([address] and Enlarged prostate. Past surgical history:   has a past surgical history that includes Arm Surgery. Social History:   reports that he has never smoked. He has never used smokeless tobacco. He reports previous alcohol use. Family history:none    No Known Allergies      Objective:   /81   Pulse 88   Temp 97.4 °F (36.3 °C) (Oral)   Resp 16   Ht 6' 4\" (1.93 m)   Wt 192 lb 4.8 oz (87.2 kg)   SpO2 96%   BMI 23.41 kg/m²       Intake/Output Summary (Last 24 hours) at 3/11/2020 1148  Last data filed at 3/11/2020 0659  Gross per 24 hour   Intake --   Output 1400 ml   Net -1400 ml       TELEMETRY: Atrial fibrillation (rate controlled)    Physical Exam:  Constitutional:  Well developed, Well nourished, No acute distress, Non-toxic appearance.    HENT:  Normocephalic, Atraumatic, Bilateral external ears normal, Oropharynx moist, No oral exudates, Nose normal. Neck- Normal range of motion, No tenderness, Supple, No stridor. Eyes:  PERRL,  Conjunctiva normal, No discharge. Respiratory:  Normal breath sounds, No respiratory distress, No wheezing, No chest tenderness. Cardiovascular: (PMI) apex non displaced,no lifts no thrills, no s3,no s4, irregular heart rate, irregular rhythm, No murmurs, No rubs, No gallops. GI:  Bowel sounds normal, Soft, No tenderness, No masses, No pulsatile masses. Musculoskeletal:  Intact distal pulses, No edema, No tenderness, No cyanosis, No clubbing. Integument:  Warm, Dry, No erythema, No rash. Right femoral groin site soft, nontender. No hematoma. No redness or swelling. Neurologic:  Alert & oriented x 3, Normal motor function, Normal sensory function, No focal deficits noted. Psychiatric:  Affect normal, Judgment normal, Mood normal.     Medications:    docusate sodium  100 mg Oral BID    apixaban  5 mg Oral BID    lisinopril  2.5 mg Oral Daily    digoxin  125 mcg Oral Daily    metoprolol succinate  100 mg Oral Daily    finasteride  5 mg Oral Daily    sodium chloride flush  10 mL Intravenous 2 times per day    pantoprazole  40 mg Intravenous Daily    tamsulosin  0.4 mg Oral Daily    polyethylene glycol  17 g Oral Daily       senna, sodium chloride flush, acetaminophen **OR** acetaminophen, polyethylene glycol, promethazine **OR** ondansetron, bisacodyl    Lab Data:  CBC:   Recent Labs     03/09/20  0549 03/10/20  0339 03/11/20  0409   WBC 9.8 9.3 7.9   HGB 15.0 14.9 14.8   HCT 46.8 45.9 45.3   MCV 94.0 92.0 92.3    175 175     BMP:   Recent Labs     03/09/20  0549 03/10/20  0339 03/11/20  0409    136 133*   K 4.3 3.8 4.1   CL 99 100 100   CO2 29 24 23   BUN 19 17 12   CREATININE 1.3 1.1 1.1           Assessment and Plan  1. Atrial Fibrillation: plan for rate control for now as SEEMA reveals ALEJANDRA clot. Continue eliquis 5 mg BID, digoxin 125 mcg daily, and toprol xl 100 mg daily.  BP has

## 2020-03-12 NOTE — CARE COORDINATION
Consult placed for Cardiac Rehab: (EF less than 40%)  [x] Yes  [] No    Interactive Notebook review: (www. [email or web address])  [x] Yes  [] No     Pacemaker/ Lifevest/ ICD review:  [] Yes  [x] No     Date: 3/10/20        Time: 2:30  Time spent educatin minutes  CN visit done. Introduced myself and explained my role as CN. Educational information and my contact information provided. CN provided Teaching following the Heart Failure book, the HF Zones, and the Sodium Content pamphlet. We reviewed the HF zones, signs and symptoms to report on day 1 of onset, medication compliance, daily weights, low sodium diet and limiting fluids. The patient's contributing risk factors for heart failure are identified as: HFrEF. Advised patient you can reduce the risk for heart failure exacerbations by modifying/controlling the risk factors they have. Pt informed to take medications as prescribed, follow a cardiac heart healthy / low sodium diet and limit fluids. A Fib recommendations discussed:  1. Avoid stimulants ( Caffeine products, Chocolate and decongestants)  2. Avoid alcohol products  3. Avoid stress  4. Avoid overworking. (Get plenty of rest balanced with activity.)  5. Get your sleep study done and if needed get your CPAP. All questions verbalized were answered. Patient states he was in the yellow zone for weeks prior to admission and saw his PCP. States frustration over new diagnosis of CHF. Emotional support provided. Patient receptive to education provided and follow up at the [facility]. CN will follow. Date:  3/12/20           Time: 10:45  10 minutes of education provided  Patient is being discharged home today. Chart review reveals no diuretics ordered. PS sent to Dr Qing Pina. Patient visit done. - Medications reviewed. -CHF zones reviewed. Patient requesting new PCP. Appointment made at the walk in clinic. All other questions verbalized were answered.   Patient aware of my role and will call if needs arise. CN will follow. Discharge Checklist:  Destination:  [x] Home  [] SNF  [] LTC  Additional support:  [] [facility]  [] Hospice                                Home medication review:  ACE/ARB/ARNI   (EF < 40%)          [x] Yes            [] No  If EF less than 40. Why?:  BB   (EF < 40% use only carvedilol, metoprolol succinate or bisoprolol)          [x] Yes           [] No  Diuretic          [x] Yes           [] No  Aldosterone blockers   (EF < 40%, Cr < 2.5 mg/dl in men, < 2.0 mg/dl in women)          [] Yes           [] No  Hydralazine/Nitrate   (If self identified  and LV<40%)          [] Yes           [] No   Lanoxin          [x] Yes           [] No  Amiodarone          [] Yes           [] No  Midodrine          [] Yes           [] No  Anticoagulant          [x] Yes           [] No  Medications to avoid          [] Yes           [] No   Other:    Home Equipment:    [] Home O2       [] 24/7     [] HS    [] PRN       Liters used:  [] Nebulizer  [] CPAP  [] BIPAP  [x] None of the above    Follow up at CHF Clinic:  [x] Yes  [] No    Follow up call  Date: 3/12/20      Time: 10:50  Follow up call done. Patients mom answers the phone. States he is at the urologist right now. Explained who I am and told her to let him know I called. CN will follow.     Maria Isabel Cruz RN, CHF Care Navigator

## 2020-03-12 NOTE — CARE COORDINATION
needs arise. CN will follow. Discharge Checklist:  Destination:  [x] Home  [] SNF  [] LTC  Additional support:  [] [facility] Cherry  [] Hospice                                Home medication review:  ACE/ARB/ARNI   (EF < 40%)          [x] Yes            [] No  If EF less than 40. Why?:  BB   (EF < 40% use only carvedilol, metoprolol succinate or bisoprolol)          [x] Yes           [] No  Diuretic          [x] Yes           [] No  Aldosterone blockers   (EF < 40%, Cr < 2.5 mg/dl in men, < 2.0 mg/dl in women)          [] Yes           [] No  Hydralazine/Nitrate   (If self identified  and LV<40%)          [] Yes           [] No   Lanoxin          [x] Yes           [] No  Amiodarone          [] Yes           [] No  Midodrine          [] Yes           [] No  Anticoagulant          [x] Yes           [] No  Medications to avoid          [] Yes           [] No   Other:    Home Equipment:    [] Home O2       [] 24/7     [] HS    [] PRN       Liters used:  [] Nebulizer  [] CPAP  [] BIPAP  [x] None of the above    Follow up at CHF Clinic:  [x] Yes  [] No    Follow up call  Date: 3/12/20      Time: 10:50  Follow up call done. Patients mom answers the phone. States he is at the urologist right now. Explained who I am and told her to let him know I called. CN will follow. Date: 3/13/20      Time: 10:45  Patient called regarding questions with medications and diet. Patient inquiring about eating out once a day. I informed him that I cannot approve of him eating out once a day. Reminded him that he is on a 1500 mg sodium restriction. States he had a salad at [facility] yesterday which had less than 300 mg of sodium. Patient states he felt weird after taking lasix yesterday. Asking if he could hold off on this med. Instructed him that this is his water pill and he may need to take this to prevent fluid overload. Renetta العراقي CNP called and new orders received for the lasix.  Patient will take the diuretic only if in the yellow zone. Patient will also do BP a couple of times a day and take the results with him to his appointment Monday. All other questions verbalized answered. CN will be available if additional needs arise.       Felipe Marx RN, CHF Care Navigator

## 2020-03-16 ENCOUNTER — INITIAL CONSULT (OUTPATIENT)
Dept: CARDIOLOGY CLINIC | Age: 70
End: 2020-03-16
Payer: OTHER GOVERNMENT

## 2020-03-16 VITALS
HEART RATE: 84 BPM | WEIGHT: 195.8 LBS | BODY MASS INDEX: 23.84 KG/M2 | DIASTOLIC BLOOD PRESSURE: 72 MMHG | RESPIRATION RATE: 18 BRPM | SYSTOLIC BLOOD PRESSURE: 118 MMHG | OXYGEN SATURATION: 97 % | HEIGHT: 76 IN

## 2020-03-16 PROBLEM — I50.42 CHRONIC COMBINED SYSTOLIC AND DIASTOLIC CONGESTIVE HEART FAILURE (HCC): Status: ACTIVE | Noted: 2020-03-16

## 2020-03-16 PROBLEM — I42.8 NONISCHEMIC CARDIOMYOPATHY (HCC): Status: ACTIVE | Noted: 2020-03-16

## 2020-03-16 PROBLEM — I10 ESSENTIAL HYPERTENSION: Status: ACTIVE | Noted: 2020-03-16

## 2020-03-16 PROBLEM — I48.19 PERSISTENT ATRIAL FIBRILLATION (HCC): Status: ACTIVE | Noted: 2020-03-16

## 2020-03-16 PROBLEM — I51.3 THROMBUS OF LEFT ATRIAL APPENDAGE: Status: ACTIVE | Noted: 2020-03-16

## 2020-03-16 PROCEDURE — G8427 DOCREV CUR MEDS BY ELIG CLIN: HCPCS | Performed by: NURSE PRACTITIONER

## 2020-03-16 PROCEDURE — 99214 OFFICE O/P EST MOD 30 MIN: CPT | Performed by: NURSE PRACTITIONER

## 2020-03-16 PROCEDURE — 3017F COLORECTAL CA SCREEN DOC REV: CPT | Performed by: NURSE PRACTITIONER

## 2020-03-16 PROCEDURE — G8420 CALC BMI NORM PARAMETERS: HCPCS | Performed by: NURSE PRACTITIONER

## 2020-03-16 PROCEDURE — 1111F DSCHRG MED/CURRENT MED MERGE: CPT | Performed by: NURSE PRACTITIONER

## 2020-03-16 PROCEDURE — 1123F ACP DISCUSS/DSCN MKR DOCD: CPT | Performed by: NURSE PRACTITIONER

## 2020-03-16 PROCEDURE — 4040F PNEUMOC VAC/ADMIN/RCVD: CPT | Performed by: NURSE PRACTITIONER

## 2020-03-16 PROCEDURE — G8484 FLU IMMUNIZE NO ADMIN: HCPCS | Performed by: NURSE PRACTITIONER

## 2020-03-16 PROCEDURE — 1036F TOBACCO NON-USER: CPT | Performed by: NURSE PRACTITIONER

## 2020-03-16 RX ORDER — METOPROLOL SUCCINATE 25 MG/1
25 TABLET, EXTENDED RELEASE ORAL DAILY
Qty: 30 TABLET | Refills: 3 | Status: SHIPPED | OUTPATIENT
Start: 2020-03-16 | End: 2020-04-06 | Stop reason: SDUPTHER

## 2020-03-16 NOTE — PROGRESS NOTES
[address]      Visit Date: 3/16/2020  Cardiologist:  Dr. Sammy Licea  Primary Care Physician: JOÃO Hay    Yadi Garcia is a 78 y.o. male who presents today for:    CC:   Combined Systolic and Diastolic Congestive Heart Failure  Severe Nonischemic Cardiomyopathy  Persistent Atrial Fibrillation  ALEJANDRA Thrombus  HTN    HPI:   Yadi Garcia is a 78 y.o. male who presents to the office for a new patient visit in the heart failure clinic. He recently presented to the Emergency Room with complaints of urinary retention and constipation. He was found to be in atrial fibrillation with RVR. ECHO revealed LVEF 10-20% with grade III diastolic dysfunction. He had a LHC which revealed No CAD with nonischemic cardiomyopathy. A SEEMA revealed a ALEJANDRA clot. Patient was unable to be cardioverted as a result and was started on Eliquis 5mg BID with plans to rate control his atrial fibrillation for 3 months. At that time will reevaluate ALEJANDRA clot for possible cardioversion if needed. He reports that he is feeling well since discharge. He states that he walked 1.5 miles this morning without fatigue or shortness of breath. He denies chest pain, palpitations, edema, dizziness, or syncope. He states he is taking his medications, although he would prefer not to take them. He has questions regarding heart failure and how the medications will help him.      Chief Complaint   Patient presents with    Congestive Heart Failure     Patient has:  Last hospital admission related to Heart Failure:  3/6/2020   baseline BNP 62792     baseline weight 192 pounds   Chest Pain: no  Worsening SOB/orthopnea/PND: no  Edema: no  Any extra diuretic use: no  Weight gain: no  Compliant checking home weight: yes  Fatigue: yes- improving  Abdominal bloating: no  Appetite: good  Difficulty sleeping: no  RA: NA  Cough: no  Compliant checking blood pressure: yes  Compliant with medication regimen: yes- however relunctant    Vaccinations:  flu No  Vaccinations : pneumonia No      Device: No   ICD counseling:Yes     Activity: good  Can you walk 1-2 blocks or do a moderate amount of house/yard work? Yes    NYHA Class: I   Class I   Patients with no limitation of activities; they suffer no symptoms from ordinary activities. Sodium Restrictions: 2g  Fluid Restrictions: 48-64 oz/day  Sodium and fluid restriction compliance: yes      Past Medical History:   Diagnosis Date    Atrial fibrillation (HCC)     CHF (congestive heart failure) (HCC)     Enlarged prostate     H/O echocardiogram     H/O percutaneous left heart catheterization      Past Surgical History:   Procedure Laterality Date    ARM SURGERY       History reviewed. No pertinent family history. Social History     Tobacco Use    Smoking status: Never Smoker    Smokeless tobacco: Never Used   Substance Use Topics    Alcohol use: Not Currently     Current Outpatient Medications   Medication Sig Dispense Refill    apixaban (ELIQUIS) 5 MG TABS tablet Take 1 tablet by mouth 2 times daily 60 tablet 1    lisinopril (PRINIVIL;ZESTRIL) 2.5 MG tablet Take 1 tablet by mouth daily 30 tablet 3    metoprolol succinate (TOPROL XL) 100 MG extended release tablet Take 1 tablet by mouth daily 30 tablet 3    digoxin (LANOXIN) 125 MCG tablet Take 1 tablet by mouth daily 30 tablet 3    docusate sodium (COLACE, DULCOLAX) 100 MG CAPS Take 100 mg by mouth 2 times daily 60 capsule 1    polyethylene glycol (GLYCOLAX) packet Take 17 g by mouth daily as needed for Constipation 527 g 1    finasteride (PROSCAR) 5 MG tablet Take 1 tablet by mouth daily 30 tablet 3    tamsulosin (FLOMAX) 0.4 MG capsule Take 1 capsule by mouth daily 30 capsule 3    furosemide (LASIX) 20 MG tablet Take 1 tablet by mouth every 48 hours (Patient not taking: Reported on 3/16/2020) 60 tablet 3     No current facility-administered medications for this visit.       No Known Allergies    SUBJECTIVE: Review of Systems:   · Constitutional: No Fever,no unintentional weight Loss   · Eyes: No change in Vision:     · ENT: No Headaches, Hearing Loss or Vertigo. No tinnitus   · Cardiovascular: as per note above   · Respiratory: No cough or wheezing and as per note above. · Gastrointestinal: no abdominal pain, no appetite loss, no blood in stools, constipation, or diarrhea, No heartburn  · Genitourinary: No dysuria, trouble voiding, or hematuria  · Musculoskeletal: back pain- No: myalgia No,  Arthralgia- Yes  · Integumentary: No rash or pruritis  · Neurological: No TIA or stroke symptoms  · Psychiatric: Anxiety- No: depression-  No   · Endocrine: No malaise-No, fatigue Yes,- no temperature intolerance  · Hematologic/Lymphatic: No bleeding problems, blood clots or swollen lymph nodes  · Allergic/Immunologic: No nasal congestion or hives    OBJECTIVE:   Today's Vitals:  /72 (Site: Left Upper Arm, Position: Sitting, Cuff Size: Large Adult)   Pulse 80   Resp 18   Ht 6' 4\" (1.93 m)   Wt 195 lb 12.8 oz (88.8 kg)   SpO2 98%   BMI 23.83 kg/m²     Wt Readings from Last 3 Encounters:   03/16/20 195 lb 12.8 oz (88.8 kg)   03/11/20 192 lb 4.8 oz (87.2 kg)     BP Readings from Last 3 Encounters:   03/16/20 118/72   03/11/20 106/81     Pulse Readings from Last 3 Encounters:   03/16/20 80   03/11/20 88     Body mass index is 23.83 kg/m². GENERAL - Alert, oriented, pleasant, in no apparent distress. Head -unremarkable  Eyes - pupils equal and reactive to light - bilateral conjunctiva are pink: sclera are white   ENT - external ears intact, nose is intact:  tongue is midline pink and moist  Neck - Supple. Jugular venous distention noted No: carotid bruits appreciated No.   Cardiovascular - Normal S1 and S2: murmur appreciated- grade 2/6 systolic murmur, No gallop. Regular rate- No,  rhythm regular-No.   Extremities - No cyanosis, clubbing, No edema to lower legs. Pulmonary - No respiratory distress.   No wheezes or rales. Chest is is clear  Pulses: Bilateral radial and pedal pulses normal  Abdomen -  Soft no tenderness, non distended   Musculoskeletal - Normal movement of all extremities   Neurologic - alert and oriented: There are no gross focal neurologic abnormalities. Skin-  No rash: No echymosis   Affect- normal mood and pleasant     6 minute walk test:  Time walked 6  Distance walked 1260  Required Oxygen No    Most recent ECHO:    Definity used during exam.   Left ventricular systolic function is abnormal.   Ejection fraction is visually estimated at 10%. Global hypokinesis noted. Dilated left ventricle. Grade III diastolic dysfunction. Moderately dilated cardiac chambers. Mild to moderate mitral regurgitation is present. Physiological pericardial effusion. Bilateral pleural effusion.     Results reviewed:  BNP:   Lab Results   Component Value Date    PROBNP 10,332 (H) 03/06/2020     CBC:   Lab Results   Component Value Date    WBC 7.9 03/11/2020    RBC 4.91 03/11/2020    HGB 14.8 03/11/2020    HCT 45.3 03/11/2020     03/11/2020     CMP:    Lab Results   Component Value Date     03/11/2020    K 4.1 03/11/2020     03/11/2020    CO2 23 03/11/2020    BUN 12 03/11/2020    CREATININE 1.1 03/11/2020    GFRAA >60 03/11/2020    LABGLOM >60 03/11/2020    GLUCOSE 88 03/11/2020    CALCIUM 8.2 03/11/2020     Hepatic Function Panel:    Lab Results   Component Value Date    ALKPHOS 56 03/06/2020    ALT 48 03/06/2020    AST 55 03/06/2020    PROT 6.5 03/06/2020    BILITOT 1.2 03/06/2020    LABALBU 3.8 03/06/2020     Magnesium:    Lab Results   Component Value Date    MG 2.0 03/11/2020     PT/INR:    Lab Results   Component Value Date    PROTIME 17.7 03/06/2020    INR 1.46 03/06/2020     Lipids:    Lab Results   Component Value Date    TRIG 83 03/10/2020    HDL 44 03/10/2020    LDLDIRECT 99 03/10/2020       Iron Studies:    Iron Deficiency Anemia:  No IV Iron Therapy:  No  [facility] for changes in the following symptoms:    Weight gain of 3 pounds in 1 day or 5 pounds in 1 week   Increased shortness of breath   Shortness of breath while laying down   Cough   Chest pain   Swelling in feet, ankles or legs   Tenderness or bloating in the abdomen   Fatigue    Decreased appetite or nausea    Confusion      patient to follow up with Dr Avery Decker in 1 month  Spent 40 minutes face to face time. Greater than 50% of the time was spent on patient education. Patient given educational materials - see patient instructions. We discussed the importance of weighing oneself and recording daily. We also discussed the importance of a lowsodium diet, higher sodium foods to avoid and better low sodium food options. Discussed use, benefit, and side effects of prescribed medications. All patient questions answered. Patient verbalizes understanding of plan of care using teach back method, and is agreeable to the treatment plan.      Copy of note to be sent to consulting provider and primary cardiologist   Electronicallysigned by Verita Cowden, APRN - CNP on 3/16/2020 at 2:42 PM

## 2020-04-09 ENCOUNTER — TELEPHONE (OUTPATIENT)
Dept: CARDIOLOGY CLINIC | Age: 70
End: 2020-04-09

## 2020-04-09 NOTE — TELEPHONE ENCOUNTER
Patient would like a cheaper alternative to his Eliquis prescription and would like to talk to a nurse. He states he was given something different when he was in the hospital and wants to know if that was a cheaper alternative.

## 2020-04-13 RX ORDER — FINASTERIDE 5 MG/1
5 TABLET, FILM COATED ORAL DAILY
Qty: 90 TABLET | Refills: 3 | OUTPATIENT
Start: 2020-04-13

## 2020-04-29 ENCOUNTER — OFFICE VISIT (OUTPATIENT)
Dept: CARDIOLOGY CLINIC | Age: 70
End: 2020-04-29
Payer: MEDICARE

## 2020-04-29 ENCOUNTER — TELEPHONE (OUTPATIENT)
Dept: CARDIOLOGY CLINIC | Age: 70
End: 2020-04-29

## 2020-04-29 VITALS
SYSTOLIC BLOOD PRESSURE: 130 MMHG | HEART RATE: 80 BPM | DIASTOLIC BLOOD PRESSURE: 80 MMHG | HEIGHT: 76 IN | WEIGHT: 190 LBS | BODY MASS INDEX: 23.14 KG/M2

## 2020-04-29 PROCEDURE — G8420 CALC BMI NORM PARAMETERS: HCPCS | Performed by: INTERNAL MEDICINE

## 2020-04-29 PROCEDURE — 4040F PNEUMOC VAC/ADMIN/RCVD: CPT | Performed by: INTERNAL MEDICINE

## 2020-04-29 PROCEDURE — 99215 OFFICE O/P EST HI 40 MIN: CPT | Performed by: INTERNAL MEDICINE

## 2020-04-29 PROCEDURE — 3017F COLORECTAL CA SCREEN DOC REV: CPT | Performed by: INTERNAL MEDICINE

## 2020-04-29 PROCEDURE — 1036F TOBACCO NON-USER: CPT | Performed by: INTERNAL MEDICINE

## 2020-04-29 PROCEDURE — G8427 DOCREV CUR MEDS BY ELIG CLIN: HCPCS | Performed by: INTERNAL MEDICINE

## 2020-04-29 PROCEDURE — 1123F ACP DISCUSS/DSCN MKR DOCD: CPT | Performed by: INTERNAL MEDICINE

## 2020-04-29 NOTE — PROGRESS NOTES
4/29/2020) 30 tablet 3     No current facility-administered medications for this visit. Allergies: Patient has no known allergies. Past Medical History:   Diagnosis Date    Atrial fibrillation ([address]     CHF (congestive heart failure) (HCC)     Enlarged prostate     H/O echocardiogram     H/O percutaneous left heart catheterization      Past Surgical History:   Procedure Laterality Date    ARM SURGERY       No family history on file. Social History     Tobacco Use    Smoking status: Never Smoker    Smokeless tobacco: Never Used   Substance Use Topics    Alcohol use: Not Currently      [unfilled]  Review of Systems:   · Constitutional: No Fever or Weight Loss   · Eyes: No Decreased Vision  · ENT: No Headaches, Hearing Loss or Vertigo  · Cardiovascular: No chest pain, dyspnea on exertion, palpitations or loss of consciousness  · Respiratory: No cough or wheezing    · Gastrointestinal: No abdominal pain, appetite loss, blood in stools, constipation, diarrhea or heartburn  · Genitourinary: No dysuria, trouble voiding, or hematuria  · Musculoskeletal:  No gait disturbance, weakness or joint complaints  · Integumentary: No rash or pruritis  · Neurological: No TIA or stroke symptoms  · Psychiatric: No anxiety or depression  · Endocrine: No malaise, fatigue or temperature intolerance  · Hematologic/Lymphatic: No bleeding problems, blood clots or swollen lymph nodes  · Allergic/Immunologic: No nasal congestion or hives  All systems negative except as marked. Objective:  /80   Pulse 80   Ht 6' 4\" (1.93 m)   Wt 190 lb (86.2 kg)   BMI 23.13 kg/m²   Wt Readings from Last 3 Encounters:   04/29/20 190 lb (86.2 kg)   03/16/20 195 lb 12.8 oz (88.8 kg)   03/11/20 192 lb 4.8 oz (87.2 kg)     Body mass index is 23.13 kg/m².   GENERAL - Alert, oriented, pleasant, in no apparent distress,normal grooming  HEENT - pupils are reactive to light and accomodation, cornea intact, conjunctive normal color, ears are normal

## 2020-04-29 NOTE — TELEPHONE ENCOUNTER
Dr. Latrell Ocampo saw patient for OV today and recommends patient to have SEEMA in May (OV note says June, but Dr. Latrell Ocampo advises patient requests this procedure be done in May). SEEMA scheduled for 5/6/20 @ 12:30 pm at [facility]. Patient given instructions and signed consent. Questions answered. Procedure papers scanned to Media. Routing to MARK ANTHONY White to follow up.

## 2020-04-30 ENCOUNTER — TELEPHONE (OUTPATIENT)
Dept: CARDIOLOGY CLINIC | Age: 70
End: 2020-04-30

## 2020-04-30 NOTE — TELEPHONE ENCOUNTER
Called patient to schedule echo. Left message for patient to return call. Authorization not required. Weight 190 lbs.

## 2020-05-04 ENCOUNTER — TELEPHONE (OUTPATIENT)
Dept: CARDIOLOGY CLINIC | Age: 70
End: 2020-05-04

## 2020-05-04 NOTE — TELEPHONE ENCOUNTER
Called patient to schedule echo. Patient was unaware of echo ordered. Patient would like clarification as to when and if he needs the echocardiogram on top of the SEEMA. Please advise so that patient understands plan of treatment.

## 2020-05-04 NOTE — TELEPHONE ENCOUNTER
Patient called wanting to know why he has to have a Echo and a SEEMA. I explained to the patient he had a blood clot and they usually check with a Echo before a SEEMA. The patient was getting upset and just wanted to argue. I was not telling him what he wanted to hear. I sent Dr. Dustin Casillas a perfect serve message to call this patient.

## 2020-05-12 ENCOUNTER — TELEPHONE (OUTPATIENT)
Dept: CARDIOLOGY CLINIC | Age: 70
End: 2020-05-12

## 2020-05-16 ENCOUNTER — HOSPITAL ENCOUNTER (OUTPATIENT)
Age: 70
Setting detail: SPECIMEN
Discharge: HOME | End: 2020-05-16
Payer: OTHER GOVERNMENT

## 2020-05-16 PROCEDURE — U0002 COVID-19 LAB TEST NON-CDC: HCPCS

## 2020-05-18 LAB
SARS-COV-2: NOT DETECTED
SOURCE: NORMAL

## 2020-05-19 ENCOUNTER — TELEPHONE (OUTPATIENT)
Dept: CARDIOLOGY CLINIC | Age: 70
End: 2020-05-19

## 2020-05-19 ENCOUNTER — PROCEDURE VISIT (OUTPATIENT)
Dept: CARDIOLOGY CLINIC | Age: 70
End: 2020-05-19
Payer: MEDICARE

## 2020-05-19 PROCEDURE — 93306 TTE W/DOPPLER COMPLETE: CPT | Performed by: INTERNAL MEDICINE

## 2020-05-21 ENCOUNTER — HOSPITAL ENCOUNTER (OUTPATIENT)
Dept: NON INVASIVE DIAGNOSTICS | Age: 70
Discharge: HOME | End: 2020-05-21
Payer: MEDICARE

## 2020-05-21 VITALS
DIASTOLIC BLOOD PRESSURE: 86 MMHG | HEART RATE: 67 BPM | OXYGEN SATURATION: 100 % | SYSTOLIC BLOOD PRESSURE: 104 MMHG | RESPIRATION RATE: 20 BRPM

## 2020-05-21 LAB
ANION GAP SERPL CALCULATED.3IONS-SCNC: 8 MMOL/L (ref 4–16)
BUN BLDV-MCNC: 17 MG/DL (ref 6–23)
CALCIUM SERPL-MCNC: 8.8 MG/DL (ref 8.3–10.6)
CHLORIDE BLD-SCNC: 102 MMOL/L (ref 99–110)
CO2: 25 MMOL/L (ref 21–32)
CREAT SERPL-MCNC: 1.1 MG/DL (ref 0.9–1.3)
EKG ATRIAL RATE: 61 BPM
EKG DIAGNOSIS: NORMAL
EKG P AXIS: 76 DEGREES
EKG P-R INTERVAL: 244 MS
EKG Q-T INTERVAL: 422 MS
EKG QRS DURATION: 98 MS
EKG QTC CALCULATION (BAZETT): 424 MS
EKG R AXIS: 36 DEGREES
EKG T AXIS: 24 DEGREES
EKG VENTRICULAR RATE: 61 BPM
GFR AFRICAN AMERICAN: >60 ML/MIN/1.73M2
GFR NON-AFRICAN AMERICAN: >60 ML/MIN/1.73M2
GLUCOSE BLD-MCNC: 92 MG/DL (ref 70–99)
LV EF: 20 %
LVEF MODALITY: NORMAL
POTASSIUM SERPL-SCNC: 4.3 MMOL/L (ref 3.5–5.1)
SODIUM BLD-SCNC: 135 MMOL/L (ref 135–145)

## 2020-05-21 PROCEDURE — 92960 CARDIOVERSION ELECTRIC EXT: CPT | Performed by: INTERNAL MEDICINE

## 2020-05-21 PROCEDURE — 93010 ELECTROCARDIOGRAM REPORT: CPT | Performed by: INTERNAL MEDICINE

## 2020-05-21 PROCEDURE — 93325 DOPPLER ECHO COLOR FLOW MAPG: CPT | Performed by: INTERNAL MEDICINE

## 2020-05-21 PROCEDURE — 92960 CARDIOVERSION ELECTRIC EXT: CPT

## 2020-05-21 PROCEDURE — 93312 ECHO TRANSESOPHAGEAL: CPT

## 2020-05-21 PROCEDURE — 93312 ECHO TRANSESOPHAGEAL: CPT | Performed by: INTERNAL MEDICINE

## 2020-05-21 PROCEDURE — 7100000000 HC PACU RECOVERY - FIRST 15 MIN

## 2020-05-21 PROCEDURE — 93005 ELECTROCARDIOGRAM TRACING: CPT | Performed by: INTERNAL MEDICINE

## 2020-05-21 PROCEDURE — 80048 BASIC METABOLIC PNL TOTAL CA: CPT

## 2020-05-21 PROCEDURE — 7100000001 HC PACU RECOVERY - ADDTL 15 MIN

## 2020-05-21 NOTE — PROCEDURES
SYNCHRONIZED  CARDIOVERSION     After sedation/SEEMA ,d/c synchronized cardioversion was performed with 200J. Impression: Patient is successfully cardioverted into NSR.     PLAN: continue anticoagulation

## 2020-05-27 ENCOUNTER — TELEPHONE (OUTPATIENT)
Dept: CARDIOLOGY CLINIC | Age: 70
End: 2020-05-27

## 2020-05-27 NOTE — TELEPHONE ENCOUNTER
Left ventricular systolic function is abnormal.   Ejection fraction is visually estimated at 20%. Mild prolapse of the anterior mitral valve leaflet. There was no thrombus noted in the left atrial appendage. Successful cardioversion using 200 joules.

## 2020-05-27 NOTE — TELEPHONE ENCOUNTER
Rec'd cardiac clearance request from [facility] for cystoscopy/TURP per Dr. Zulma Umana under general anesthesia; surgery not yet scheduled pending clearance. Patient just had SEEMA/cardioversion on 5/21/2020. Discussed w/Dr. Dustin Casillas; he advises patient must wait at least 6 weeks before having any surgery. Dr. Dustin Casillas will see patient for follow up OV in 4 weeks and address cardiac clearance at that time. Notified Miguelangel at Dr. Maisha Bianchi office.   Scheduled patient's follow up [address] for 6/24/20 @ 11:20 am.

## 2020-06-23 ENCOUNTER — TELEPHONE (OUTPATIENT)
Dept: CARDIOLOGY CLINIC | Age: 70
End: 2020-06-23

## 2020-06-23 NOTE — TELEPHONE ENCOUNTER
Spoke with patient, he had concern about blood in urine. Will discuss at [facility] tomorrow with Dr Dileep Tanner.

## 2020-06-24 ENCOUNTER — TELEPHONE (OUTPATIENT)
Dept: CARDIOLOGY CLINIC | Age: 70
End: 2020-06-24

## 2020-06-24 ENCOUNTER — OFFICE VISIT (OUTPATIENT)
Dept: CARDIOLOGY CLINIC | Age: 70
End: 2020-06-24
Payer: MEDICARE

## 2020-06-24 VITALS
BODY MASS INDEX: 22.53 KG/M2 | SYSTOLIC BLOOD PRESSURE: 126 MMHG | WEIGHT: 185 LBS | DIASTOLIC BLOOD PRESSURE: 66 MMHG | HEIGHT: 76 IN | HEART RATE: 73 BPM | TEMPERATURE: 97.2 F

## 2020-06-24 PROCEDURE — 1036F TOBACCO NON-USER: CPT | Performed by: INTERNAL MEDICINE

## 2020-06-24 PROCEDURE — 93000 ELECTROCARDIOGRAM COMPLETE: CPT | Performed by: INTERNAL MEDICINE

## 2020-06-24 PROCEDURE — 3017F COLORECTAL CA SCREEN DOC REV: CPT | Performed by: INTERNAL MEDICINE

## 2020-06-24 PROCEDURE — G8420 CALC BMI NORM PARAMETERS: HCPCS | Performed by: INTERNAL MEDICINE

## 2020-06-24 PROCEDURE — 1123F ACP DISCUSS/DSCN MKR DOCD: CPT | Performed by: INTERNAL MEDICINE

## 2020-06-24 PROCEDURE — G8427 DOCREV CUR MEDS BY ELIG CLIN: HCPCS | Performed by: INTERNAL MEDICINE

## 2020-06-24 PROCEDURE — 4040F PNEUMOC VAC/ADMIN/RCVD: CPT | Performed by: INTERNAL MEDICINE

## 2020-06-24 PROCEDURE — 99215 OFFICE O/P EST HI 40 MIN: CPT | Performed by: INTERNAL MEDICINE

## 2020-06-24 NOTE — PROGRESS NOTES
Judy Dorantes MD        OFFICE  FOLLOWUP NOTE    Chief complaints: patient is here for management of NICM, PAF, ALEJANDRA CLOT, BPH    Subjective: patient feels better, no chest pain, no shortness of breath, no dizziness, + palpitations    HPI Criss Reno is a 69 y. o.year old who  has a past medical history of Atrial fibrillation ([facility] [address] CHF (congestive heart failure) ([facility] [address] Enlarged prostate, H/O echocardiogram, and H/O percutaneous left heart catheterization. and presents for management of NICM, PAF, ALEJANDRA CLOT, BPHwhich are well controlled    He had SEEMA and had resolution of ALEJANDRA and was cardioverted, however, he felt palpitation and is in afib again. he wants to do  BPH treatment  Current Outpatient Medications   Medication Sig Dispense Refill    metoprolol succinate (TOPROL XL) 25 MG extended release tablet Take 1 tablet by mouth daily Takes with 100mg - 125mg daily 90 tablet 3    lisinopril (PRINIVIL;ZESTRIL) 2.5 MG tablet Take 1 tablet by mouth daily 90 tablet 3    digoxin (LANOXIN) 125 MCG tablet Take 1 tablet by mouth daily 90 tablet 3    apixaban (ELIQUIS) 5 MG TABS tablet Take 1 tablet by mouth 2 times daily 180 tablet 3    metoprolol succinate (TOPROL XL) 100 MG extended release tablet Take 1 tablet by mouth daily Takes with 25mg - total 125mg daily 90 tablet 3    docusate sodium (COLACE, DULCOLAX) 100 MG CAPS Take 100 mg by mouth 2 times daily 60 capsule 1    finasteride (PROSCAR) 5 MG tablet Take 1 tablet by mouth daily 30 tablet 3    tamsulosin (FLOMAX) 0.4 MG capsule Take 1 capsule by mouth daily 30 capsule 3     No current facility-administered medications for this visit. Allergies: Patient has no known allergies.   Past Medical History:   Diagnosis Date    Atrial fibrillation ([facility] [address]     CHF (congestive heart failure) (HCC)     Enlarged prostate     H/O echocardiogram 05/21/2020    Mild prolapse anterior mitral valve leaflet,EF20%,no thrombus    H/O percutaneous left heart

## 2020-07-13 ENCOUNTER — HOSPITAL ENCOUNTER (OUTPATIENT)
Age: 70
Discharge: HOME | End: 2020-07-13
Payer: MEDICARE

## 2020-07-13 PROCEDURE — 87186 SC STD MICRODIL/AGAR DIL: CPT

## 2020-07-13 PROCEDURE — 87077 CULTURE AEROBIC IDENTIFY: CPT

## 2020-07-13 PROCEDURE — 87086 URINE CULTURE/COLONY COUNT: CPT

## 2020-07-16 LAB
CULTURE: ABNORMAL
Lab: ABNORMAL
SPECIMEN: ABNORMAL

## 2020-07-27 ENCOUNTER — ANESTHESIA EVENT (OUTPATIENT)
Dept: OPERATING ROOM | Age: 70
End: 2020-07-27
Payer: MEDICARE

## 2020-07-29 NOTE — ANESTHESIA PRE PROCEDURE
Department of Anesthesiology  Preprocedure Note       Name:  Blank Payment   Age:  69 y.o.  :  1950                                          MRN:  [id number]         Date:  2020      Surgeon: Sharmin Marshall):  Honorio Velásquez MD    Procedure: Procedure(s):  CYSTOSCOPY W/BIPOLAR TURP    Medications prior to admission:   Prior to Admission medications    Medication Sig Start Date End Date Taking?  Authorizing Provider   apixaban (ELIQUIS) 5 MG TABS tablet Take 1 tablet by mouth 2 times daily 20   Julia Joya MD   metoprolol succinate (TOPROL XL) 25 MG extended release tablet Take 1 tablet by mouth daily Takes with 100mg - 125mg daily 20   ROSALINO Barrera CNP   lisinopril (PRINIVIL;ZESTRIL) 2.5 MG tablet Take 1 tablet by mouth daily 20   ROSALINO Barrera CNP   digoxin (LANOXIN) 125 MCG tablet Take 1 tablet by mouth daily 20   ROSALINO Barrera CNP   apixaban (ELIQUIS) 5 MG TABS tablet Take 1 tablet by mouth 2 times daily 20   ROSALINO Barrera CNP   metoprolol succinate (TOPROL XL) 100 MG extended release tablet Take 1 tablet by mouth daily Takes with 25mg - total 125mg daily 20   ROSALINO Barrera CNP   docusate sodium (COLACE, DULCOLAX) 100 MG CAPS Take 100 mg by mouth 2 times daily 3/11/20   Nicole Walters MD   finasteride (PROSCAR) 5 MG tablet Take 1 tablet by mouth daily 3/12/20   Nicole Walters MD   tamsulosin (FLOMAX) 0.4 MG capsule Take 1 capsule by mouth daily 3/12/20   Nicole Walters MD       Current medications:    Current Outpatient Medications   Medication Sig Dispense Refill    apixaban (ELIQUIS) 5 MG TABS tablet Take 1 tablet by mouth 2 times daily 56 tablet 0    metoprolol succinate (TOPROL XL) 25 MG extended release tablet Take 1 tablet by mouth daily Takes with 100mg - 125mg daily 90 tablet 3    lisinopril (PRINIVIL;ZESTRIL) 2.5 MG tablet Take 1 tablet by mouth daily 90 tablet 3    digoxin (LANOXIN) 125 MCG tablet Take 1 tablet by mouth daily 90 tablet 3    apixaban (ELIQUIS) 5 MG TABS tablet Take 1 tablet by mouth 2 times daily 180 tablet 3    metoprolol succinate (TOPROL XL) 100 MG extended release tablet Take 1 tablet by mouth daily Takes with 25mg - total 125mg daily 90 tablet 3    docusate sodium (COLACE, DULCOLAX) 100 MG CAPS Take 100 mg by mouth 2 times daily 60 capsule 1    finasteride (PROSCAR) 5 MG tablet Take 1 tablet by mouth daily 30 tablet 3    tamsulosin (FLOMAX) 0.4 MG capsule Take 1 capsule by mouth daily 30 capsule 3     No current facility-administered medications for this encounter. Allergies:  No Known Allergies    Problem List:    Patient Active Problem List   Diagnosis Code    Atrial fibrillation with RVR ([facility]) I48.91    Chronic combined systolic and diastolic congestive heart failure ([facility]) I50.42    Essential hypertension I10    Thrombus of left atrial appendage I51.3    Nonischemic cardiomyopathy ([facility]) I42.8    Persistent atrial fibrillation I48.19    Paroxysmal atrial fibrillation ([facility]) I48.0       Past Medical History:        Diagnosis Date    [facility] [address]     [facility]F [facility]congestive heart failure) [facility] [address]     Enlarged prostate     H/O echocardiogram 05/21/2020    Mild prolapse anterior mitral valve leaflet,EF20%,no thrombus    H/O percutaneous left heart catheterization        Past Surgical History:        Procedure Laterality Date    ARM SURGERY         Social History:    Social History     Tobacco Use    Smoking status: Never Smoker    Smokeless tobacco: Never Used   Substance Use Topics    Alcohol use: Yes     Comment: \"drink 1-2 beers per day\"                                Counseling given: Not Answered      Vital Signs (Current): There were no vitals filed for this visit.                                            BP Readings from Last 3 Encounters:   06/24/20 126/66   05/21/20 104/86   04/29/20 130/80       NPO Status: BMI:   Wt Readings from Last 3 Encounters:   06/24/20 185 lb (83.9 kg)   04/29/20 190 lb (86.2 kg)   03/16/20 195 lb 12.8 oz (88.8 kg)     There is no height or weight on file to calculate BMI. CBC  Lab Results   Component Value Date/Time    WBC 8.9 07/31/2020 02:12 PM    HGB 13.5 07/31/2020 02:12 PM    HCT 40.6 (L) 07/31/2020 02:12 PM     07/31/2020 02:12 PM     RENAL  Lab Results   Component Value Date/Time     07/31/2020 02:12 PM    K 4.7 07/31/2020 02:12 PM     07/31/2020 02:12 PM    CO2 22 07/31/2020 02:12 PM    BUN 18 07/31/2020 02:12 PM    CREATININE 1.3 07/31/2020 02:12 PM    GLUCOSE 124 (H) 07/31/2020 02:12 PM     COAGS  Lab Results   Component Value Date/Time    PROTIME 17.7 (H) 03/06/2020 11:10 PM    INR 1.46 03/06/2020 11:10 PM       No results found for: PREGTESTUR  No results found for: HCGQUANT    COVID-19 Screening (If Applicable):   Lab Results   Component Value Date    COVID19 NOT DETECTED 05/16/2020         Anesthesia Evaluation  Patient summary reviewed and Nursing notes reviewed   history of anesthetic complications: PONV. Airway: Mallampati: II  TM distance: >3 FB   Neck ROM: full  Mouth opening: > = 3 FB Dental: normal exam         Pulmonary:                             ROS comment: Non smoker      PAT Airway. Limited exam. COVID 19 precautions. Patient wearing mask  Head/Neck movement: full  History of difficult intubation:  None  Teeth: missing upper and lower molars   Able to lie flat     LUNGS:  No increased work of breathing, good air exchange, clear to auscultation bilaterally, no crackles or wheezing      COVID 19 screening  Denies recent fever or known COVID 19 exposure. Instructed to quarantine until surgery and report any new respiratory or fever symptoms to surgeon. Aware COVID testing must be completed before DOS.  COVID swab:   7/31/2020   Cardiovascular:  Exercise tolerance: good (>4 METS),   (+) hypertension (on beta Evaluation will follow by a certified practitioner prior to surgery.   7/29/2020

## 2020-07-31 ENCOUNTER — HOSPITAL ENCOUNTER (OUTPATIENT)
Dept: LAB | Age: 70
Discharge: HOME | End: 2020-07-31
Payer: MEDICARE

## 2020-07-31 ENCOUNTER — HOSPITAL ENCOUNTER (OUTPATIENT)
Dept: PREADMISSION TESTING | Age: 70
Discharge: HOME | End: 2020-08-04
Payer: MEDICARE

## 2020-07-31 VITALS
BODY MASS INDEX: 23.14 KG/M2 | HEART RATE: 90 BPM | TEMPERATURE: 97 F | HEIGHT: 76 IN | WEIGHT: 190 LBS | DIASTOLIC BLOOD PRESSURE: 46 MMHG | SYSTOLIC BLOOD PRESSURE: 79 MMHG | OXYGEN SATURATION: 97 % | RESPIRATION RATE: 18 BRPM

## 2020-07-31 LAB
ANION GAP SERPL CALCULATED.3IONS-SCNC: 13 MMOL/L (ref 4–16)
BUN BLDV-MCNC: 18 MG/DL (ref 6–23)
CALCIUM SERPL-MCNC: 9.5 MG/DL (ref 8.3–10.6)
CHLORIDE BLD-SCNC: 105 MMOL/L (ref 99–110)
CO2: 22 MMOL/L (ref 21–32)
CREAT SERPL-MCNC: 1.3 MG/DL (ref 0.9–1.3)
GFR AFRICAN AMERICAN: >60 ML/MIN/1.73M2
GFR NON-AFRICAN AMERICAN: 54 ML/MIN/1.73M2
GLUCOSE BLD-MCNC: 124 MG/DL (ref 70–99)
HCT VFR BLD CALC: 40.6 % (ref 42–52)
HEMOGLOBIN: 13.5 GM/DL (ref 13.5–18)
MCH RBC QN AUTO: 31.9 PG (ref 27–31)
MCHC RBC AUTO-ENTMCNC: 33.3 % (ref 32–36)
MCV RBC AUTO: 96 FL (ref 78–100)
PDW BLD-RTO: 13.3 % (ref 11.7–14.9)
PLATELET # BLD: 210 K/CU MM (ref 140–440)
PMV BLD AUTO: 10.7 FL (ref 7.5–11.1)
POTASSIUM SERPL-SCNC: 4.7 MMOL/L (ref 3.5–5.1)
RBC # BLD: 4.23 M/CU MM (ref 4.6–6.2)
SODIUM BLD-SCNC: 140 MMOL/L (ref 135–145)
WBC # BLD: 8.9 K/CU MM (ref 4–10.5)

## 2020-07-31 PROCEDURE — 36415 COLL VENOUS BLD VENIPUNCTURE: CPT

## 2020-07-31 PROCEDURE — 85027 COMPLETE CBC AUTOMATED: CPT

## 2020-07-31 PROCEDURE — 80048 BASIC METABOLIC PNL TOTAL CA: CPT

## 2020-07-31 PROCEDURE — U0002 COVID-19 LAB TEST NON-CDC: HCPCS

## 2020-07-31 RX ORDER — CIPROFLOXACIN 2 MG/ML
400 INJECTION, SOLUTION INTRAVENOUS ONCE
Status: CANCELLED | OUTPATIENT
Start: 2020-08-06

## 2020-08-04 LAB
SARS-COV-2: NOT DETECTED
SOURCE: NORMAL

## 2020-08-05 PROBLEM — N13.8 BPH WITH URINARY OBSTRUCTION: Status: ACTIVE | Noted: 2020-08-05

## 2020-08-05 PROBLEM — N40.1 BPH WITH URINARY OBSTRUCTION: Status: ACTIVE | Noted: 2020-08-05

## 2020-08-06 ENCOUNTER — HOSPITAL ENCOUNTER (OUTPATIENT)
Age: 70
Setting detail: OBSERVATION
Discharge: HOME | End: 2020-08-07
Attending: UROLOGY | Admitting: UROLOGY
Payer: MEDICARE

## 2020-08-06 ENCOUNTER — ANESTHESIA (OUTPATIENT)
Dept: OPERATING ROOM | Age: 70
End: 2020-08-06
Payer: MEDICARE

## 2020-08-06 VITALS
OXYGEN SATURATION: 100 % | SYSTOLIC BLOOD PRESSURE: 122 MMHG | TEMPERATURE: 98.6 F | RESPIRATION RATE: 19 BRPM | DIASTOLIC BLOOD PRESSURE: 92 MMHG

## 2020-08-06 PROBLEM — Z90.79 S/P TURP: Status: ACTIVE | Noted: 2020-08-06

## 2020-08-06 PROCEDURE — 2709999900 HC NON-CHARGEABLE SUPPLY: Performed by: UROLOGY

## 2020-08-06 PROCEDURE — 3600000003 HC SURGERY LEVEL 3 BASE: Performed by: UROLOGY

## 2020-08-06 PROCEDURE — 6370000000 HC RX 637 (ALT 250 FOR IP): Performed by: UROLOGY

## 2020-08-06 PROCEDURE — 88300 SURGICAL PATH GROSS: CPT

## 2020-08-06 PROCEDURE — 3600000013 HC SURGERY LEVEL 3 ADDTL 15MIN: Performed by: UROLOGY

## 2020-08-06 PROCEDURE — 88305 TISSUE EXAM BY PATHOLOGIST: CPT

## 2020-08-06 PROCEDURE — 7100000001 HC PACU RECOVERY - ADDTL 15 MIN: Performed by: UROLOGY

## 2020-08-06 PROCEDURE — 2500000003 HC RX 250 WO HCPCS: Performed by: NURSE ANESTHETIST, CERTIFIED REGISTERED

## 2020-08-06 PROCEDURE — 3700000001 HC ADD 15 MINUTES (ANESTHESIA): Performed by: UROLOGY

## 2020-08-06 PROCEDURE — 6360000002 HC RX W HCPCS: Performed by: NURSE ANESTHETIST, CERTIFIED REGISTERED

## 2020-08-06 PROCEDURE — 2580000003 HC RX 258: Performed by: UROLOGY

## 2020-08-06 PROCEDURE — 6360000002 HC RX W HCPCS: Performed by: UROLOGY

## 2020-08-06 PROCEDURE — 7100000000 HC PACU RECOVERY - FIRST 15 MIN: Performed by: UROLOGY

## 2020-08-06 PROCEDURE — 3700000000 HC ANESTHESIA ATTENDED CARE: Performed by: UROLOGY

## 2020-08-06 PROCEDURE — G0378 HOSPITAL OBSERVATION PER HR: HCPCS

## 2020-08-06 RX ORDER — ONDANSETRON 2 MG/ML
INJECTION INTRAMUSCULAR; INTRAVENOUS PRN
Status: DISCONTINUED | OUTPATIENT
Start: 2020-08-06 | End: 2020-08-06 | Stop reason: SDUPTHER

## 2020-08-06 RX ORDER — DIGOXIN 125 MCG
125 TABLET ORAL DAILY
Status: DISCONTINUED | OUTPATIENT
Start: 2020-08-06 | End: 2020-08-07 | Stop reason: HOSPADM

## 2020-08-06 RX ORDER — SODIUM CHLORIDE 0.9 % (FLUSH) 0.9 %
10 SYRINGE (ML) INJECTION EVERY 12 HOURS SCHEDULED
Status: DISCONTINUED | OUTPATIENT
Start: 2020-08-06 | End: 2020-08-07 | Stop reason: HOSPADM

## 2020-08-06 RX ORDER — LIDOCAINE HYDROCHLORIDE 20 MG/ML
INJECTION, SOLUTION INTRAVENOUS PRN
Status: DISCONTINUED | OUTPATIENT
Start: 2020-08-06 | End: 2020-08-06 | Stop reason: SDUPTHER

## 2020-08-06 RX ORDER — OXYCODONE HYDROCHLORIDE AND ACETAMINOPHEN 5; 325 MG/1; MG/1
1 TABLET ORAL EVERY 4 HOURS PRN
Status: DISCONTINUED | OUTPATIENT
Start: 2020-08-06 | End: 2020-08-07 | Stop reason: HOSPADM

## 2020-08-06 RX ORDER — METOPROLOL SUCCINATE 25 MG/1
25 TABLET, EXTENDED RELEASE ORAL DAILY
Status: DISCONTINUED | OUTPATIENT
Start: 2020-08-06 | End: 2020-08-06 | Stop reason: DRUGHIGH

## 2020-08-06 RX ORDER — OXYCODONE HYDROCHLORIDE AND ACETAMINOPHEN 5; 325 MG/1; MG/1
2 TABLET ORAL EVERY 4 HOURS PRN
Status: DISCONTINUED | OUTPATIENT
Start: 2020-08-06 | End: 2020-08-07 | Stop reason: HOSPADM

## 2020-08-06 RX ORDER — LISINOPRIL 2.5 MG/1
2.5 TABLET ORAL DAILY
Status: DISCONTINUED | OUTPATIENT
Start: 2020-08-06 | End: 2020-08-07 | Stop reason: HOSPADM

## 2020-08-06 RX ORDER — SODIUM CHLORIDE 9 MG/ML
INJECTION, SOLUTION INTRAVENOUS CONTINUOUS
Status: DISCONTINUED | OUTPATIENT
Start: 2020-08-06 | End: 2020-08-07 | Stop reason: HOSPADM

## 2020-08-06 RX ORDER — FENTANYL CITRATE 50 UG/ML
INJECTION, SOLUTION INTRAMUSCULAR; INTRAVENOUS PRN
Status: DISCONTINUED | OUTPATIENT
Start: 2020-08-06 | End: 2020-08-06 | Stop reason: SDUPTHER

## 2020-08-06 RX ORDER — CIPROFLOXACIN 2 MG/ML
400 INJECTION, SOLUTION INTRAVENOUS EVERY 12 HOURS
Status: DISCONTINUED | OUTPATIENT
Start: 2020-08-07 | End: 2020-08-07 | Stop reason: HOSPADM

## 2020-08-06 RX ORDER — PROMETHAZINE HYDROCHLORIDE 12.5 MG/1
12.5 TABLET ORAL EVERY 6 HOURS PRN
Status: DISCONTINUED | OUTPATIENT
Start: 2020-08-06 | End: 2020-08-07 | Stop reason: HOSPADM

## 2020-08-06 RX ORDER — DEXAMETHASONE SODIUM PHOSPHATE 4 MG/ML
INJECTION, SOLUTION INTRA-ARTICULAR; INTRALESIONAL; INTRAMUSCULAR; INTRAVENOUS; SOFT TISSUE PRN
Status: DISCONTINUED | OUTPATIENT
Start: 2020-08-06 | End: 2020-08-06 | Stop reason: SDUPTHER

## 2020-08-06 RX ORDER — METOPROLOL SUCCINATE 50 MG/1
100 TABLET, EXTENDED RELEASE ORAL DAILY
Status: DISCONTINUED | OUTPATIENT
Start: 2020-08-06 | End: 2020-08-07 | Stop reason: HOSPADM

## 2020-08-06 RX ORDER — CIPROFLOXACIN 2 MG/ML
400 INJECTION, SOLUTION INTRAVENOUS ONCE
Status: COMPLETED | OUTPATIENT
Start: 2020-08-06 | End: 2020-08-06

## 2020-08-06 RX ORDER — ROCURONIUM BROMIDE 10 MG/ML
INJECTION, SOLUTION INTRAVENOUS PRN
Status: DISCONTINUED | OUTPATIENT
Start: 2020-08-06 | End: 2020-08-06 | Stop reason: SDUPTHER

## 2020-08-06 RX ORDER — ACETAMINOPHEN 325 MG/1
650 TABLET ORAL EVERY 4 HOURS PRN
Status: DISCONTINUED | OUTPATIENT
Start: 2020-08-06 | End: 2020-08-07 | Stop reason: HOSPADM

## 2020-08-06 RX ORDER — ATROPA BELLADONNA AND OPIUM 16.2; 6 MG/1; MG/1
60 SUPPOSITORY RECTAL EVERY 8 HOURS PRN
Status: DISCONTINUED | OUTPATIENT
Start: 2020-08-06 | End: 2020-08-07 | Stop reason: HOSPADM

## 2020-08-06 RX ORDER — ETOMIDATE 2 MG/ML
INJECTION INTRAVENOUS PRN
Status: DISCONTINUED | OUTPATIENT
Start: 2020-08-06 | End: 2020-08-06 | Stop reason: SDUPTHER

## 2020-08-06 RX ORDER — POLYETHYLENE GLYCOL 3350 17 G/17G
17 POWDER, FOR SOLUTION ORAL DAILY PRN
Status: DISCONTINUED | OUTPATIENT
Start: 2020-08-06 | End: 2020-08-07 | Stop reason: HOSPADM

## 2020-08-06 RX ORDER — SODIUM CHLORIDE 0.9 % (FLUSH) 0.9 %
10 SYRINGE (ML) INJECTION PRN
Status: DISCONTINUED | OUTPATIENT
Start: 2020-08-06 | End: 2020-08-07 | Stop reason: HOSPADM

## 2020-08-06 RX ORDER — ONDANSETRON 2 MG/ML
4 INJECTION INTRAMUSCULAR; INTRAVENOUS EVERY 6 HOURS PRN
Status: DISCONTINUED | OUTPATIENT
Start: 2020-08-06 | End: 2020-08-07 | Stop reason: HOSPADM

## 2020-08-06 RX ORDER — PROPOFOL 10 MG/ML
INJECTION, EMULSION INTRAVENOUS PRN
Status: DISCONTINUED | OUTPATIENT
Start: 2020-08-06 | End: 2020-08-06 | Stop reason: SDUPTHER

## 2020-08-06 RX ORDER — ESMOLOL HYDROCHLORIDE 10 MG/ML
INJECTION INTRAVENOUS PRN
Status: DISCONTINUED | OUTPATIENT
Start: 2020-08-06 | End: 2020-08-06 | Stop reason: SDUPTHER

## 2020-08-06 RX ADMIN — LIDOCAINE HYDROCHLORIDE 100 MG: 20 INJECTION, SOLUTION INTRAVENOUS at 13:12

## 2020-08-06 RX ADMIN — ATROPA BELLADONNA AND OPIUM 60 MG: 16.2; 6 SUPPOSITORY RECTAL at 16:08

## 2020-08-06 RX ADMIN — FENTANYL CITRATE 50 MCG: 50 INJECTION INTRAMUSCULAR; INTRAVENOUS at 14:10

## 2020-08-06 RX ADMIN — ROCURONIUM BROMIDE 20 MG: 10 INJECTION INTRAVENOUS at 13:34

## 2020-08-06 RX ADMIN — DEXAMETHASONE SODIUM PHOSPHATE 4 MG: 4 INJECTION, SOLUTION INTRAMUSCULAR; INTRAVENOUS at 13:20

## 2020-08-06 RX ADMIN — DIGOXIN 125 MCG: 125 TABLET ORAL at 18:17

## 2020-08-06 RX ADMIN — LISINOPRIL 2.5 MG: 2.5 TABLET ORAL at 18:17

## 2020-08-06 RX ADMIN — METOPROLOL SUCCINATE 100 MG: 50 TABLET, EXTENDED RELEASE ORAL at 18:19

## 2020-08-06 RX ADMIN — FENTANYL CITRATE 50 MCG: 50 INJECTION INTRAMUSCULAR; INTRAVENOUS at 13:43

## 2020-08-06 RX ADMIN — CIPROFLOXACIN 400 MG: 2 INJECTION, SOLUTION INTRAVENOUS at 13:19

## 2020-08-06 RX ADMIN — POLYETHYLENE GLYCOL 3350 17 G: 17 POWDER, FOR SOLUTION ORAL at 21:31

## 2020-08-06 RX ADMIN — ETOMIDATE 12 MG: 2 INJECTION, SOLUTION INTRAVENOUS at 13:12

## 2020-08-06 RX ADMIN — SODIUM CHLORIDE: 9 INJECTION, SOLUTION INTRAVENOUS at 16:11

## 2020-08-06 RX ADMIN — PROPOFOL 80 MG: 10 INJECTION, EMULSION INTRAVENOUS at 13:12

## 2020-08-06 RX ADMIN — ROCURONIUM BROMIDE 50 MG: 10 INJECTION INTRAVENOUS at 13:12

## 2020-08-06 RX ADMIN — ONDANSETRON 4 MG: 2 INJECTION INTRAMUSCULAR; INTRAVENOUS at 14:19

## 2020-08-06 RX ADMIN — FENTANYL CITRATE 50 MCG: 50 INJECTION INTRAMUSCULAR; INTRAVENOUS at 13:40

## 2020-08-06 RX ADMIN — ROCURONIUM BROMIDE 15 MG: 10 INJECTION INTRAVENOUS at 14:08

## 2020-08-06 RX ADMIN — FENTANYL CITRATE 50 MCG: 50 INJECTION INTRAMUSCULAR; INTRAVENOUS at 13:10

## 2020-08-06 RX ADMIN — ESMOLOL HYDROCHLORIDE 15 MG: 10 INJECTION, SOLUTION INTRAVENOUS at 14:23

## 2020-08-06 ASSESSMENT — PULMONARY FUNCTION TESTS
PIF_VALUE: 12
PIF_VALUE: 13
PIF_VALUE: 12
PIF_VALUE: 12
PIF_VALUE: 13
PIF_VALUE: 19
PIF_VALUE: 12
PIF_VALUE: 12
PIF_VALUE: 13
PIF_VALUE: 13
PIF_VALUE: 12
PIF_VALUE: 13
PIF_VALUE: 25
PIF_VALUE: 13
PIF_VALUE: 12
PIF_VALUE: 12
PIF_VALUE: 1
PIF_VALUE: 12
PIF_VALUE: 0
PIF_VALUE: 15
PIF_VALUE: 16
PIF_VALUE: 0
PIF_VALUE: 12
PIF_VALUE: 13
PIF_VALUE: 13
PIF_VALUE: 12
PIF_VALUE: 3
PIF_VALUE: 11
PIF_VALUE: 12
PIF_VALUE: 13
PIF_VALUE: 11
PIF_VALUE: 4
PIF_VALUE: 12
PIF_VALUE: 15
PIF_VALUE: 20
PIF_VALUE: 12
PIF_VALUE: 1
PIF_VALUE: 13
PIF_VALUE: 13
PIF_VALUE: 12
PIF_VALUE: 12
PIF_VALUE: 0
PIF_VALUE: 12
PIF_VALUE: 12
PIF_VALUE: 21
PIF_VALUE: 12
PIF_VALUE: 3
PIF_VALUE: 12
PIF_VALUE: 2
PIF_VALUE: 12
PIF_VALUE: 12
PIF_VALUE: 3
PIF_VALUE: 12
PIF_VALUE: 13
PIF_VALUE: 12
PIF_VALUE: 14
PIF_VALUE: 12
PIF_VALUE: 13
PIF_VALUE: 13
PIF_VALUE: 0
PIF_VALUE: 13
PIF_VALUE: 18
PIF_VALUE: 16
PIF_VALUE: 12
PIF_VALUE: 23
PIF_VALUE: 0
PIF_VALUE: 12
PIF_VALUE: 13
PIF_VALUE: 12
PIF_VALUE: 13
PIF_VALUE: 12
PIF_VALUE: 13
PIF_VALUE: 12
PIF_VALUE: 0
PIF_VALUE: 21
PIF_VALUE: 12
PIF_VALUE: 2
PIF_VALUE: 12
PIF_VALUE: 4
PIF_VALUE: 12
PIF_VALUE: 7
PIF_VALUE: 15
PIF_VALUE: 12
PIF_VALUE: 12
PIF_VALUE: 20
PIF_VALUE: 13
PIF_VALUE: 12
PIF_VALUE: 13
PIF_VALUE: 12
PIF_VALUE: 13
PIF_VALUE: 13
PIF_VALUE: 12
PIF_VALUE: 13
PIF_VALUE: 18
PIF_VALUE: 12
PIF_VALUE: 12
PIF_VALUE: 13
PIF_VALUE: 17
PIF_VALUE: 17
PIF_VALUE: 13
PIF_VALUE: 0
PIF_VALUE: 13
PIF_VALUE: 12
PIF_VALUE: 15
PIF_VALUE: 12
PIF_VALUE: 0
PIF_VALUE: 12
PIF_VALUE: 1

## 2020-08-06 ASSESSMENT — PAIN DESCRIPTION - PROGRESSION: CLINICAL_PROGRESSION: NOT CHANGED

## 2020-08-06 ASSESSMENT — PAIN SCALES - WONG BAKER
WONGBAKER_NUMERICALRESPONSE: 4
WONGBAKER_NUMERICALRESPONSE: 4

## 2020-08-06 ASSESSMENT — PAIN DESCRIPTION - DESCRIPTORS: DESCRIPTORS: ACHING

## 2020-08-06 ASSESSMENT — PAIN DESCRIPTION - FREQUENCY: FREQUENCY: CONTINUOUS

## 2020-08-06 ASSESSMENT — PAIN - FUNCTIONAL ASSESSMENT
PAIN_FUNCTIONAL_ASSESSMENT: 0-10
PAIN_FUNCTIONAL_ASSESSMENT: ACTIVITIES ARE NOT PREVENTED

## 2020-08-06 ASSESSMENT — PAIN DESCRIPTION - PAIN TYPE: TYPE: SURGICAL PAIN

## 2020-08-06 ASSESSMENT — PAIN SCALES - GENERAL
PAINLEVEL_OUTOF10: 5
PAINLEVEL_OUTOF10: 0
PAINLEVEL_OUTOF10: 5
PAINLEVEL_OUTOF10: 0

## 2020-08-06 ASSESSMENT — PAIN DESCRIPTION - ORIENTATION: ORIENTATION: MID

## 2020-08-06 ASSESSMENT — PAIN DESCRIPTION - LOCATION: LOCATION: GROIN

## 2020-08-06 ASSESSMENT — PAIN DESCRIPTION - ONSET: ONSET: ON-GOING

## 2020-08-06 NOTE — PROGRESS NOTES
1530 patient received from the OR monitor placed and monitor placed and alarms on. Report received from [address] [place] [address] Rd Dr Marisol Appiah in to visit at bedside and is aware of patients discomfort and okay to start patient on the B&O supp. As ordered waiting for pharmacy to deliver medication   [phone] turned and extra linen removed   7038 transferred to room 4129 via bed and belongings.  Report given [place] HAUSER

## 2020-08-06 NOTE — PROGRESS NOTES
Dr. Doroteo Ott covering for Dr. Kelley Johnson; called about Lovenox injection. Per Dr. Doroteo Ott hold Lovenox injection dose.

## 2020-08-06 NOTE — H&P
Department of Urology         Date: 2020   Patient: Gabino Thomas   : 1950   DOA: 2020   MRN: [id number]   ROOM#: OR/NONE       CHIEF COMPLAINT:  Urinary retention 2nd BPH    History Obtained From:  patient, electronic medical record    HISTORY OF PRESENT ILLNESS:                The patient is a 69 y.o. male with significant past medical history of BPH & a fib who presents for TURP today. Office note 20     Enlarged prostate with lower urinary tract symptoms (N40.1). 77 yo male with indwelling catheter that was placed at [facility] with weakened urinary stream - had retention. chronically on Proscar and Lasix 20 mg po daily  on Eliquis for h/o a fib (sees Dr. Martha Cobb)  cystoscopy  with large median lobe  bled just with removal of the catheter   cystoscopy with median/lateral lobes protruding into the bladder  w/o intervention (TURP) he will need a chronic indwelling catheter indefinitely - would need cardiac clearance to hold Eliquis (echocardiogram 20). Discussed with patient the reasonably known risk of this procedure including but are not limiting to: infection, bleeding, delayed bleeding, retrograde ejaculation, erectile dysfunction, urinary incontinence, dysuria, new onset of frequency or urgency, urinary retention, need for additional surgery, and risk of general anesthesia including death.       Encounter for screening for malignant neoplasm of prostate (Z12.5). remote h/o prostate biopsy (benign ~ 20 years ago).   PSA 7.4   will obtain his psa from Dr. Mukund Salazarters office .            Past Medical History:        Diagnosis Date    Atrial fibrillation [facility])     follows with Dr Thakur Duty CHF (congestive heart failure) ([facility] [address] 2020    Enlarged prostate     H/O echocardiogram 2020    Mild prolapse anterior mitral valve leaflet,EF20%,no thrombus    H/O percutaneous left heart catheterization     [place] (hard of hearing)     no hearing aides    Hypotension  Indwelling catheter present on admission     \"had catheter in since 2020\"    Iron deficiency anemia     PONV (postoperative nausea and vomiting)     \"sick in  but did fine after that with other surgeries\"    UTI (urinary tract infection)     week 2020- was on antibiotic- had repeat urine test 2020 but never heard the results\"    Wears glasses      Past Surgical History:        Procedure Laterality Date    ARM SURGERY Left     \"have plate and screws still in place\"   [address]      per old chart had cath 3/2020    CARDIOVERSION  2020    \"had cardioversion- worked for some time but the atrial fib is back\"    COLONOSCOPY      EYE SURGERY  2015    left eye cataract ext      Current Medications:   Current Facility-Administered Medications: ciprofloxacin (CIPRO) IVPB 400 mg, 400 mg, Intravenous, Once    Allergies:  Patient has no known allergies. Social History:   TOBACCO:   reports that he has never smoked. He has never used smokeless tobacco.  ETOH:   reports current alcohol use. DRUGS:   Drug: Marijuana. MARITAL STATUS:     OCCUPATION:      Family History:         Problem Relation Age of Onset    Cancer Mother         thyroid cancer    Cancer Father         lymphoma       REVIEW OF SYSTEMS:    See above. PHYSICAL EXAM:    VITALS:  BP (!) 121/95   Pulse 76   Temp 97 °F (36.1 °C) (Temporal)   Resp 18   SpO2 99%     TEMPERATURE:  Current - Temp: 97 °F (36.1 °C); Max - Temp  Av °F (36.1 °C)  Min: 97 °F (36.1 °C)  Max: 97 °F (36.1 °C)  24HR BLOOD PRESSURE RANGE:  Systolic (89QSR), AZQ:523 , Min:121 , LHS:271   ; Diastolic (28RVA), JRN:39, Min:95, Max:95    8HR INTAKE OUTPUT:  No intake/output data recorded.   URINARY CATHETER OUTPUT ([place]):     DRAIN/TUBE OUTPUT:        CONSTITUTIONAL:  awake, alert, cooperative, no apparent distress, and appears stated age  EYES:  Lids and lashes normal, pupils equal, round  NECK:  supple, symmetrical, trachea midline and skin normal  BACK:  Symmetric, no curvature, spinous processes are non-tender on palpation, paraspinous muscles are non-tender on palpation, no costal vertebral tenderness  LUNGS:  No increased work of breathing  ABDOMEN:  No scars, normal bowel sounds, soft, non-distended, non-tender, no masses palpated, no hepatosplenomegally      Data:    WBC:    Lab Results   Component Value Date    WBC 8.9 07/31/2020     Hemoglobin/Hematocrit:    Lab Results   Component Value Date    HGB 13.5 07/31/2020    HCT 40.6 07/31/2020     BMP:    Lab Results   Component Value Date     07/31/2020    K 4.7 07/31/2020     07/31/2020    CO2 22 07/31/2020    BUN 18 07/31/2020    LABALBU 3.8 03/06/2020    CREATININE 1.3 07/31/2020    CALCIUM 9.5 07/31/2020    GFRAA >60 07/31/2020    LABGLOM 54 07/31/2020     PT/INR:    Lab Results   Component Value Date    PROTIME 17.7 03/06/2020    INR 1.46 03/06/2020         Impression: 68 yo male with h/o urinary retention 2nd BPH      Recommendation: NPO for cystoscopy, turp today. On Proscar. [place]quis has been held by cardiology for this since Sunday past.      Patient seen and examined, chart reviewed.      Alejandra Rodriguez MD     Electronically signed at 8/6/2020

## 2020-08-06 NOTE — ANESTHESIA POSTPROCEDURE EVALUATION
Department of Anesthesiology  Postprocedure Note    Patient: Praveen Patel  MRN: [id number]  YOB: 1950  Date of evaluation: 8/6/2020  Time:  3:31 PM     Procedure Summary     Date:  08/06/20 Room / Location:  [place] [address] / [facility]    Anesthesia Start:  1304 Anesthesia Stop:  6181    Procedure:  CYSTOSCOPY W/BIPOLAR TURP REMOVAL OF BLADDER STONES (N/A ) Diagnosis:  (Enlarged prostate)    Surgeon:  Papo Mason MD Responsible Provider:  ROSALINO Breaux CRNA    Anesthesia Type:  general ASA Status:  3          Anesthesia Type: general    Jose Antonio Phase I:      Jose Antonio Phase II:      Last vitals: Reviewed and per EMR flowsheets.        Anesthesia Post Evaluation    Patient location during evaluation: PACU  Patient participation: complete - patient participated  Level of consciousness: awake and alert  Pain score: 1  Airway patency: patent  Nausea & Vomiting: no vomiting and no nausea  Complications: no  Cardiovascular status: blood pressure returned to baseline and hemodynamically stable  Respiratory status: acceptable, spontaneous ventilation, nonlabored ventilation and face mask  Hydration status: euvolemic

## 2020-08-06 NOTE — OP NOTE
[facility]    Operative Note  [facility]    Patient: Nolvia Jean Baptiste    Date: 2020  : 1950    DOA: 2020   MRN: [id number]   [facility] [id number]  ROOM#: OR/NONE     Pre-operative Diagnosis: benign prostatic enlargement    Post-operative Diagnosis: same    Procedure: Cystoscopy, transurethral resection of prostatic adenoma, removal of bladder stones. Anesthesia: General endotracheal anesthesia    Surgeons/Assistants: Binh    EBL: less than 775 ml    Complications: none    Specimens: were obtained    Indication for Procedure:      Nolvia Jean Baptiste is a 69 y.o. male with history of urinary retention 2nd BPH who is here today for a cystoscopy with bipolar transurethral resection of the prostate. Risks and benefits were explained & Nolvia Jean Baptiste agreed to proceed as planned. Description of procedure: The patient was identified in the holding area, taken to procedure room, and placed in supine position. General anesthesia was administered. The patient was then placed into the dorsal lithotomy position, then sterilely prepped and draped in the usual fashion. Time out was taken to ensure this was the proper operation, for the proper patient everyone in the room agreed. Rigid cystoscopy ensued, findings were made of very large trilobar bph with bladder stones. I elected to proceed with resection of the prostatic adenoma. I removed the cystoscope and placed a continuous flow resectoscope. I then proceeded to resect the prostatic adenoma, initiating at the median lobe, then proceeding to the left. This lobe was large enough that I didn't feel there was sufficient time to address his left lobe. All of the TURP chips were removed & bladder stones were removed. Great care was taken not to go distal to the veru montanum nor proximal to the bladder neck. Meticulous hemostatis was achieved.   I did not feel that discharge today was safe, will be kept in house

## 2020-08-07 VITALS
BODY MASS INDEX: 24.27 KG/M2 | RESPIRATION RATE: 16 BRPM | TEMPERATURE: 97 F | HEART RATE: 69 BPM | WEIGHT: 199.3 LBS | OXYGEN SATURATION: 98 % | HEIGHT: 76 IN | SYSTOLIC BLOOD PRESSURE: 102 MMHG | DIASTOLIC BLOOD PRESSURE: 60 MMHG

## 2020-08-07 LAB
HCT VFR BLD CALC: 36.4 % (ref 42–52)
HEMOGLOBIN: 12.2 GM/DL (ref 13.5–18)
MCH RBC QN AUTO: 32 PG (ref 27–31)
MCHC RBC AUTO-ENTMCNC: 33.5 % (ref 32–36)
MCV RBC AUTO: 95.5 FL (ref 78–100)
PDW BLD-RTO: 13 % (ref 11.7–14.9)
PLATELET # BLD: 195 K/CU MM (ref 140–440)
PMV BLD AUTO: 10.5 FL (ref 7.5–11.1)
RBC # BLD: 3.81 M/CU MM (ref 4.6–6.2)
WBC # BLD: 14.5 K/CU MM (ref 4–10.5)

## 2020-08-07 PROCEDURE — 6360000002 HC RX W HCPCS: Performed by: UROLOGY

## 2020-08-07 PROCEDURE — G0378 HOSPITAL OBSERVATION PER HR: HCPCS

## 2020-08-07 PROCEDURE — 6370000000 HC RX 637 (ALT 250 FOR IP): Performed by: UROLOGY

## 2020-08-07 PROCEDURE — 96365 THER/PROPH/DIAG IV INF INIT: CPT

## 2020-08-07 PROCEDURE — 96372 THER/PROPH/DIAG INJ SC/IM: CPT

## 2020-08-07 PROCEDURE — 85027 COMPLETE CBC AUTOMATED: CPT

## 2020-08-07 PROCEDURE — 94761 N-INVAS EAR/PLS OXIMETRY MLT: CPT

## 2020-08-07 PROCEDURE — 2580000003 HC RX 258: Performed by: UROLOGY

## 2020-08-07 RX ORDER — OXYCODONE HYDROCHLORIDE AND ACETAMINOPHEN 5; 325 MG/1; MG/1
1 TABLET ORAL EVERY 4 HOURS PRN
Qty: 10 TABLET | Refills: 0 | Status: SHIPPED | OUTPATIENT
Start: 2020-08-07 | End: 2020-08-10

## 2020-08-07 RX ADMIN — ENOXAPARIN SODIUM 40 MG: 40 INJECTION SUBCUTANEOUS at 08:50

## 2020-08-07 RX ADMIN — CIPROFLOXACIN 400 MG: 2 INJECTION, SOLUTION INTRAVENOUS at 00:29

## 2020-08-07 RX ADMIN — SODIUM CHLORIDE, PRESERVATIVE FREE 10 ML: 5 INJECTION INTRAVENOUS at 08:51

## 2020-08-07 RX ADMIN — METOPROLOL SUCCINATE 100 MG: 50 TABLET, EXTENDED RELEASE ORAL at 08:50

## 2020-08-07 RX ADMIN — DIGOXIN 125 MCG: 125 TABLET ORAL at 08:50

## 2020-08-07 RX ADMIN — LISINOPRIL 2.5 MG: 2.5 TABLET ORAL at 08:50

## 2020-08-07 RX ADMIN — SODIUM CHLORIDE: 9 INJECTION, SOLUTION INTRAVENOUS at 03:56

## 2020-08-07 NOTE — DISCHARGE SUMMARY
weeks     Diet: regular diet     Wound Care: routine catheter care     Follow-up with Dr. Rao Cabrera office on Monday, 8/10/20 at 8:30AM for arevalo catheter removal and with Dr. Feliciano Fields on 8/18/20 for post-op assessment     Consults: none     Procedures Performed & Treatment Rendered: Cystoscopy, transurethral resection of prostatic adenoma, removal of bladder stones    Summary of Hospital Stay/ Conclusions at Discharge: Claudean Innocent was admitted 8/6/20 s/p Cystoscopy, transurethral resection of prostatic adenoma, removal of bladder stones for treatment of BPH. Patient was admitted for observation overnight due to persistent gross hematuria due to the size of his prostate gland and kept on CBI. Urine was light red this morning but is now slightly pink tinged and is stable for discharge.        Electronically signed by Pb Mccabe PA-C on 8/7/2020 at 1:33 PM

## 2020-08-07 NOTE — PLAN OF CARE
Problem: Pain:  Description: Pain management should include both nonpharmacologic and pharmacologic interventions. Goal: Pain level will decrease  Description: Pain level will decrease  Outcome: Ongoing  Goal: Control of acute pain  Description: Control of acute pain  Outcome: Ongoing  Goal: Control of chronic pain  Description: Control of chronic pain  Outcome: Ongoing     Problem: Falls - Risk of:  Goal: Will remain free from falls  Description: Will remain free from falls  Outcome: Ongoing  Goal: Absence of physical injury  Description: Absence of physical injury  Outcome: Ongoing     Problem: Bleeding:  Goal: Will show no signs and symptoms of excessive bleeding  Description: Will show no signs and symptoms of excessive bleeding  Outcome: Ongoing     Problem:  Bowel/Gastric:  Goal: Control of bowel function will improve  Description: Control of bowel function will improve  Outcome: Ongoing  Goal: Ability to achieve a regular elimination pattern will improve  Description: Ability to achieve a regular elimination pattern will improve  Outcome: Ongoing     Problem: Nutritional:  Goal: Ability to follow a diet with enough fiber (20 to 30 grams) for normal bowel function will improve  Description: Ability to follow a diet with enough fiber (20 to 30 grams) for normal bowel function will improve  Outcome: Ongoing     Problem: Skin Integrity:  Goal: Risk for impaired skin integrity will decrease  Description: Risk for impaired skin integrity will decrease  Outcome: Ongoing

## 2020-08-07 NOTE — PROGRESS NOTES
[facility] [address]   Progress Note  [facility] 0 1 2      Date: 2020   Patient: Reshma Liu   : 1950   DOA: 2020   MRN: [id number]   ROOM#: [id number]     Admit Date: 2020     Collaborating Urologist on Call at time of admission: Dr. Niurka Springer    CC: BPH w/ LUTS  POD #1 Cystoscopy, transurethral resection of prostatic adenoma, removal of bladder stones    Subjective:     Pain: mild, no nausea and no vomiting,   Bowel Movement/Flatus:   Yes  Voiding: indwelling arevalo catheter, urine blood tinged in tubing on CBI     Patient resting comfortably in bed, urine bright red in drainage bag but light pink in tubing on CBI. Patient expresses frustration with why he received a Lovenox shot this morning due to his hematuria and is upset that he may have to stay longer due to hematuria. Explained that Lovenox is preventive and routine but patient accused me of covering up a mistake by nursing staff. Will discuss with nursing.       Objective:    Vitals:    /60   Pulse 69   Temp 97 °F (36.1 °C) (Oral)   Resp 16   Ht 6' 4\" (1.93 m)   Wt 199 lb 4.8 oz (90.4 kg)   SpO2 98%   BMI 24.26 kg/m²    Temp  Av.1 °F (36.7 °C)  Min: 97 °F (36.1 °C)  Max: 98.7 °F (37.1 °C)       Intake/Output Summary (Last 24 hours) at 2020 1005  Last data filed at 2020 3080  Gross per 24 hour   Intake 2278.75 ml   Output 1825 ml   Net 453.75 ml       Physical Exam:    General appearance: alert, appears stated age, cooperative and no distress  Head: Normocephalic, without obvious abnormality, atraumatic  Abdomen: soft, non-tender, non-distended  : Arevalo catheter in place, urine blood tinged on CBI     Labs:   WBC:    Lab Results   Component Value Date    WBC 14.5 2020      Hemoglobin/Hematocrit:    Lab Results   Component Value Date    HGB 12.2 2020    HCT 36.4 2020      BMP:   Lab Results   Component Value Date     2020    K 4.7 2020     07/31/2020    CO2 22 07/31/2020    BUN 18 07/31/2020    LABALBU 3.8 03/06/2020    CREATININE 1.3 07/31/2020    CALCIUM 9.5 07/31/2020    GFRAA >60 07/31/2020    LABGLOM 54 07/31/2020     Imaging:   No results found. Assessment & Plan:      Leander Guerrero is a 69y.o. year old male admitted 8/6/2020 for observation s/p TURP. 1) BPH: POD #1 Cystoscopy, transurethral resection of prostatic adenoma, removal of bladder stones   Urine now slightly blood tinged on CBI    Afebrile    Will re-evaluate later this afternoon, if urine remains clear and he experiences no more episodes of bright red blood, will discharge home with arevalo catheter     Hold Eliquis until 8/10/20   Follow-up on Monday, 8/10/20 for arevalo catheter removal    Follow up with Dr. Mitchell David on 8/18/20 at 1000      Patient seen and examined, chart reviewed.      Electronically signed by Kenroy Belcher PA-C on 8/7/2020 at 10:05 AM

## 2020-09-30 ENCOUNTER — OFFICE VISIT (OUTPATIENT)
Dept: CARDIOLOGY CLINIC | Age: 70
End: 2020-09-30
Payer: MEDICARE

## 2020-09-30 VITALS
DIASTOLIC BLOOD PRESSURE: 64 MMHG | SYSTOLIC BLOOD PRESSURE: 120 MMHG | HEART RATE: 80 BPM | HEIGHT: 76 IN | BODY MASS INDEX: 23.02 KG/M2 | WEIGHT: 189 LBS | TEMPERATURE: 97.2 F

## 2020-09-30 PROCEDURE — 3017F COLORECTAL CA SCREEN DOC REV: CPT | Performed by: INTERNAL MEDICINE

## 2020-09-30 PROCEDURE — 99214 OFFICE O/P EST MOD 30 MIN: CPT | Performed by: INTERNAL MEDICINE

## 2020-09-30 PROCEDURE — 1123F ACP DISCUSS/DSCN MKR DOCD: CPT | Performed by: INTERNAL MEDICINE

## 2020-09-30 PROCEDURE — G8427 DOCREV CUR MEDS BY ELIG CLIN: HCPCS | Performed by: INTERNAL MEDICINE

## 2020-09-30 PROCEDURE — G8420 CALC BMI NORM PARAMETERS: HCPCS | Performed by: INTERNAL MEDICINE

## 2020-09-30 PROCEDURE — 4040F PNEUMOC VAC/ADMIN/RCVD: CPT | Performed by: INTERNAL MEDICINE

## 2020-09-30 PROCEDURE — 1036F TOBACCO NON-USER: CPT | Performed by: INTERNAL MEDICINE

## 2020-09-30 NOTE — PROGRESS NOTES
Foster Domingo MD        OFFICE  FOLLOWUP NOTE    Chief complaints: patient is here for management of NICM, PAF, ALEJANDRA CLOT, BPH  Subjective: patient feels better, no chest pain, no shortness of breath, no dizziness, no palpitations    HPI Stan Wynn is a 69 y. o.year old who  has a past medical history of Atrial fibrillation ([facility] [address] CHF (congestive heart failure) ([facility] [place] [facility] Enlarged prostate, H/O echocardiogram, H/O percutaneous left heart catheterization, [place] (hard of hearing), Hypotension, Indwelling catheter present on admission, Iron deficiency anemia, PONV (postoperative nausea and vomiting), UTI (urinary tract infection), and Wears glasses. and presents for management of NICM, PAF, ALEJANDRA CLOT, BPH, which are well controlled    HE converted back to afib, has NICM and had ALEJANDRA clot AND IT resolved on eliquis,   Current Outpatient Medications   Medication Sig Dispense Refill    metoprolol succinate (TOPROL XL) 25 MG extended release tablet Take 1 tablet by mouth daily Takes with 100mg - 125mg daily 90 tablet 3    lisinopril (PRINIVIL;ZESTRIL) 2.5 MG tablet Take 1 tablet by mouth daily 90 tablet 3    digoxin (LANOXIN) 125 MCG tablet Take 1 tablet by mouth daily 90 tablet 3    metoprolol succinate (TOPROL XL) 100 MG extended release tablet Take 1 tablet by mouth daily Takes with 25mg - total 125mg daily 90 tablet 3     No current facility-administered medications for this visit. Allergies: Patient has no known allergies.   Past Medical History:   Diagnosis Date    Atrial fibrillation [facility])     follows with Dr Rob Jackson CHF (congestive heart failure) ([facility] [address] 03/2020    Enlarged prostate     H/O echocardiogram 05/21/2020    Mild prolapse anterior mitral valve leaflet,EF20%,no thrombus    H/O percutaneous left heart catheterization     [place] (hard of hearing)     no hearing aides    Hypotension     Indwelling catheter present on admission     \"had catheter in since March 2020\"    Iron deficiency anemia     PONV (postoperative nausea and vomiting)     \"sick in 1981 but did fine after that with other surgeries\"    UTI (urinary tract infection)     week 7/20/2020- was on antibiotic- had repeat urine test 7/27/2020 but never heard the results\"    Wears glasses      Past Surgical History:   Procedure Laterality Date    ARM SURGERY Left 1981    \"have plate and screws still in place\"   [address]      per old chart had cath 3/2020    CARDIOVERSION  05/2020    \"had cardioversion- worked for some time but the atrial fib is back\"    COLONOSCOPY  2010    EYE SURGERY  2015    left eye cataract ext     TURP N/A 8/6/2020    CYSTOSCOPY W/BIPOLAR TURP REMOVAL OF BLADDER STONES performed by Rola Mann MD at [address] History   Problem Relation Age of Onset    Cancer Mother         thyroid cancer    Cancer Father         lymphoma     Social History     Tobacco Use    Smoking status: Never Smoker    Smokeless tobacco: Never Used   Substance Use Topics    Alcohol use: Yes     Comment: \"drink 1-2 beers per day\"      [unfilled]  Review of Systems:   · Constitutional: No Fever or Weight Loss   · Eyes: No Decreased Vision  · ENT: No Headaches, Hearing Loss or Vertigo  · Cardiovascular: No chest pain, dyspnea on exertion, palpitations or loss of consciousness  · Respiratory: No cough or wheezing    · Gastrointestinal: No abdominal pain, appetite loss, blood in stools, constipation, diarrhea or heartburn  · Genitourinary: No dysuria, trouble voiding, or hematuria  · Musculoskeletal:  No gait disturbance, weakness or joint complaints  · Integumentary: No rash or pruritis  · Neurological: No TIA or stroke symptoms  · Psychiatric: No anxiety or depression  · Endocrine: No malaise, fatigue or temperature intolerance  · Hematologic/Lymphatic: No bleeding problems, blood clots or swollen lymph nodes  · Allergic/Immunologic: No nasal congestion or hives  All systems negative except as marked. Objective:  /64   Pulse 80   Temp 97.2 °F (36.2 °C)   Ht 6' 4\" (1.93 m)   Wt 189 lb (85.7 kg)   BMI 23.01 kg/m²   Wt Readings from Last 3 Encounters:   09/30/20 189 lb (85.7 kg)   08/07/20 199 lb 4.8 oz (90.4 kg)   07/31/20 190 lb (86.2 kg)     Body mass index is 23.01 kg/m². GENERAL - Alert, oriented, pleasant, in no apparent distress,normal grooming  HEENT - pupils are reactive to light and accomodation, cornea intact, conjunctive normal color, ears are normal in exam,throat exam in normal, teeth, gum and palate are normal, oral mucosa is normal without any notation of pallor or cyanosis  Neck - Supple. No jugular venous distention noted. No carotid bruits, no apical -carotid delay  Respiratory:  Normal breath sounds, No respiratory distress, No wheezing, No chest tenderness. ,no use of accessory muscles, diaphragm movement is normal  Cardiovascular: (PMI) apex non displaced,no lifts no thrills, no s3,no s4, Normal heart rate, Normal rhythm, No murmurs, No rubs, No gallops. Carotid arteries pulse and amplitude are normal no bruit, no abdominal bruit noted ( normal abdominal aorta ausculation), femoral arteries pulse and amplitude are normal no bruit, pedal pulses are normal  Femoral pulses have normal amplitude, no bruits   Extremities - No cyanosis, clubbing, or significant edema, no varicose veins    Abdomen - No masses, tenderness, or organomegaly, no hepato-splenomegally, no bruits  Musculoskeletal - No significant edema, no kyphosis or scoliosis, no deformity in any extremity noted, muscle strength and tone are normal  Skin: no ulcer,no scar,no stasis dermatitis   Neurologic - alert oriented times 3,Cranial nerves II through XII are grossly intact. There were no gross focal neurologic abnormalities.  All sensory and motor nerves examined and were normal  Psychiatric: normal mood and affect    No results found for: CKTOTAL, CKMB, CKMBINDEX, TROPONINI  BNP:  No results found for: BNP  PT/INR:  No results found for: PTINR  No results found for: LABA1C  Lab Results   Component Value Date    CHOL 150 03/10/2020    TRIG 83 03/10/2020    HDL 44 03/10/2020    LDLDIRECT 99 03/10/2020     Lab Results   Component Value Date    ALT 48 (H) 03/06/2020    AST 55 (H) 03/06/2020     TSH:  No results found for: TSH    Impression:  Reshma Petit is a 69 y. o.year old who  has a past medical history of Atrial fibrillation ([facility] [address] CHF (congestive heart failure) ([facility] [address] Enlarged prostate, H/O echocardiogram, H/O percutaneous left heart catheterization, [place] (hard of hearing), Hypotension, Indwelling catheter present on admission, Iron deficiency anemia, PONV (postoperative nausea and vomiting), UTI (urinary tract infection), and Wears glasses. and presents with     Plan:  1. Afib: , unfortunately,he is back in afib, will continue  dig oral, eliquis lopressor,WILL REFER TO EP TO DISCUSS FOR CARDIOVERSION OR ABLATION AS HE HAD FAILED 1 CARDIOVERSION,will give free samples of eliquis  2. ALEJANDRA clot resolved  3. NICM, CONTINUE lasix, LISINOPRIL AND LOPRESSOR, repeat echo,will decide for ICD  4. Leg swelling; BETTER CONTINUE lasix, venous doppler IS NORMALHealth maintenance: exerise and diet  All labs, medications and tests reviewed, continue all other medications of all above medical condition listed as is.     [unfilled]

## 2020-10-14 ENCOUNTER — INITIAL CONSULT (OUTPATIENT)
Dept: CARDIOLOGY CLINIC | Age: 70
End: 2020-10-14
Payer: MEDICARE

## 2020-10-14 VITALS
DIASTOLIC BLOOD PRESSURE: 70 MMHG | SYSTOLIC BLOOD PRESSURE: 120 MMHG | TEMPERATURE: 96.7 F | HEIGHT: 77 IN | RESPIRATION RATE: 14 BRPM | HEART RATE: 79 BPM | BODY MASS INDEX: 22.58 KG/M2 | OXYGEN SATURATION: 98 % | WEIGHT: 191.2 LBS

## 2020-10-14 PROCEDURE — G8420 CALC BMI NORM PARAMETERS: HCPCS | Performed by: INTERNAL MEDICINE

## 2020-10-14 PROCEDURE — 4040F PNEUMOC VAC/ADMIN/RCVD: CPT | Performed by: INTERNAL MEDICINE

## 2020-10-14 PROCEDURE — 1123F ACP DISCUSS/DSCN MKR DOCD: CPT | Performed by: INTERNAL MEDICINE

## 2020-10-14 PROCEDURE — 99203 OFFICE O/P NEW LOW 30 MIN: CPT | Performed by: INTERNAL MEDICINE

## 2020-10-14 PROCEDURE — G8427 DOCREV CUR MEDS BY ELIG CLIN: HCPCS | Performed by: INTERNAL MEDICINE

## 2020-10-14 PROCEDURE — 93000 ELECTROCARDIOGRAM COMPLETE: CPT | Performed by: INTERNAL MEDICINE

## 2020-10-14 PROCEDURE — G8484 FLU IMMUNIZE NO ADMIN: HCPCS | Performed by: INTERNAL MEDICINE

## 2020-10-14 PROCEDURE — 3017F COLORECTAL CA SCREEN DOC REV: CPT | Performed by: INTERNAL MEDICINE

## 2020-10-14 PROCEDURE — 1036F TOBACCO NON-USER: CPT | Performed by: INTERNAL MEDICINE

## 2020-10-14 NOTE — PROGRESS NOTES
Electrophysiology Consult Note      Reason for consultation:  Atrial fibrillation    Chief complaint :  Fluttering    Referring physician: Dr. Carine Mendenhall      Primary care physician: No primary care provider on file. History of Present Illness:     Patient is a 69 yr old male with hx of atrial fibrillation referred here for management of atrial fibrillation. Pt states he feels fluttering and skipping when laying down. Tiredness and weakness. Pt denies chest pain, shortness of breath, dizziness, and edema. Pt drinks two cups of coffee daily.       Pastmedical history:   Past Medical History:   Diagnosis Date    Atrial fibrillation [facility])     follows with Dr Subha Bashir CHF (congestive heart failure) ([facility] [place] [address] 03/2020    Enlarged prostate     H/O echocardiogram 05/21/2020    Mild prolapse anterior mitral valve leaflet,EF20%,no thrombus    H/O percutaneous left heart catheterization     [place] (hard of hearing)     no hearing aides    Hypotension     Indwelling catheter present on admission     \"had catheter in since March 2020\"    Iron deficiency anemia     PONV (postoperative nausea and vomiting)     \"sick in 1981 but did fine after that with other surgeries\"    UTI (urinary tract infection)     week 7/20/2020- was on antibiotic- had repeat urine test 7/27/2020 but never heard the results\"    Wears glasses        Surgical history :   Past Surgical History:   Procedure Laterality Date    ARM SURGERY Left 1981    \"have plate and screws still in place\"   [address]      per old chart had cath 3/2020    CARDIOVERSION  05/2020    \"had cardioversion- worked for some time but the atrial fib is back\"    COLONOSCOPY  2010    EYE SURGERY  2015    left eye cataract ext     TURP N/A 8/6/2020    CYSTOSCOPY W/BIPOLAR TURP REMOVAL OF BLADDER STONES performed by [facility] OR       Family history:   Family History   Problem Relation Age of Onset    Cancer Mother         thyroid cancer    Cancer Father         lymphoma       Social history :  reports that he has never smoked. He has never used smokeless tobacco. He reports current alcohol use. Drug: Marijuana. No Known Allergies    Current Outpatient Medications on File Prior to Visit   Medication Sig Dispense Refill    apixaban (ELIQUIS) 5 MG TABS tablet Take 1 tablet by mouth 2 times daily 28 tablet 0    metoprolol succinate (TOPROL XL) 25 MG extended release tablet Take 1 tablet by mouth daily Takes with 100mg - 125mg daily 90 tablet 3    lisinopril (PRINIVIL;ZESTRIL) 2.5 MG tablet Take 1 tablet by mouth daily 90 tablet 3    digoxin (LANOXIN) 125 MCG tablet Take 1 tablet by mouth daily 90 tablet 3    metoprolol succinate (TOPROL XL) 100 MG extended release tablet Take 1 tablet by mouth daily Takes with 25mg - total 125mg daily 90 tablet 3     No current facility-administered medications on file prior to visit. Review of Systems:   Review of Systems   Constitutional: Positive for fatigue. Negative for activity change, chills and fever. HENT: Negative for congestion, ear pain and tinnitus. Eyes: Negative for photophobia, pain and visual disturbance. Respiratory: Negative for cough, chest tightness, shortness of breath and wheezing. Cardiovascular: Positive for palpitations. Negative for chest pain and leg swelling. Gastrointestinal: Negative for abdominal pain, blood in stool, constipation, diarrhea, nausea and vomiting. Endocrine: Negative for cold intolerance and heat intolerance. Genitourinary: Negative for dysuria, flank pain and hematuria. Musculoskeletal: Positive for arthralgias. Negative for back pain, myalgias and neck stiffness. Skin: Negative for color change and rash. Allergic/Immunologic: Negative for food allergies. Neurological: Positive for weakness. Negative for dizziness, light-headedness, numbness and headaches.    Hematological: Does not bruise/bleed easily. Psychiatric/Behavioral: Negative for agitation, behavioral problems and confusion. Examination:      Vitals:    10/14/20 1440   BP: 120/70   Pulse: 79   Resp: 14   Temp: 96.7 °F (35.9 °C)   SpO2: 98%   Weight: 191 lb 3.2 oz (86.7 kg)   Height: 6' 4.5\" (1.943 m)        Body mass index is 22.97 kg/m². Physical Exam  Constitutional:       General: He is not in acute distress. Appearance: He is well-developed. HENT:      Head: Normocephalic and atraumatic. Eyes:      Pupils: Pupils are equal, round, and reactive to light. Neck:      Musculoskeletal: Normal range of motion. Vascular: No JVD. Cardiovascular:      Rate and Rhythm: Normal rate. Rhythm irregular. Heart sounds: Murmur present. No friction rub. Comments: Grade 2/6 systolic murmur  Pulmonary:      Effort: Pulmonary effort is normal. No respiratory distress. Breath sounds: Normal breath sounds. No wheezing or rales. Abdominal:      General: Bowel sounds are normal. There is no distension. Palpations: Abdomen is soft. Tenderness: There is no abdominal tenderness. Musculoskeletal:         General: No tenderness. Skin:     General: Skin is warm and dry. Neurological:      Mental Status: He is alert and oriented to person, place, and time. Cranial Nerves: No cranial nerve deficit.                CBC:   Lab Results   Component Value Date    WBC 14.5 08/07/2020    HGB 12.2 08/07/2020    HCT 36.4 08/07/2020     08/07/2020     Lipids:  Lab Results   Component Value Date    CHOL 150 03/10/2020    TRIG 83 03/10/2020    HDL 44 03/10/2020    LDLDIRECT 99 03/10/2020     PT/INR:   Lab Results   Component Value Date    INR 1.46 03/06/2020        BMP:    Lab Results   Component Value Date     07/31/2020    K 4.7 07/31/2020     07/31/2020    CO2 22 07/31/2020    BUN 18 07/31/2020     CMP:   Lab Results   Component Value Date    AST 55 (H) 03/06/2020    PROT 6.5 03/06/2020    BILITOT 1.2 (H) 03/06/2020    ALKPHOS 56 03/06/2020     TSH:  No results found for: TSH    EKGINTERPRETATION - EKG Interpretation:  Atrial fibrillation      IMPRESSION / RECOMMENDATIONS:     Persistent atrial fibrillation   Non ischemic cardiomyopathy      Patient in persistent atrial fibrillation and nonischemic cardiomyopathy    Recommend SEEMA, cardioversion with antiarrhythmic versus ablation    We had detailed discussion and answered all patient questions and he chose to consider medical therapy first    Thanks again for allowing me to participate in care of this patient. Please call me if you have any questions. With best regards. Peter Schroeder MD, 10/14/2020 2:53 PM     Please note this report has been partially produced using speech recognition software and may contain errors related to that system including errors in grammar, punctuation, and spelling, as well as words and phrases that may be inappropriate. If there are any questions or concerns please feel free to contact the dictating provider for clarification. PPM/cardiac precautions PPM/cardiac precautions/fall precautions

## 2020-10-14 NOTE — LETTER
hours. You will need to recover from the effects of the pain medicine. Chemical cardioversion: The chemical procedure is most often done in a hospital. In most cases, the medicine is put into your arm through a tube called an IV. But you may get medicines to take by mouth. You may feel a quick sting or pinch when the IV starts. The procedure usually takes about 30 to 60 minutes for procedure. Transesophageal Echocardiogram  What is a transesophageal echocardiogram?  A transesophageal echocardiogram is a test to help your doctor look at the inside of your heart. A small device called a transducer directs sound waves toward your heart. The sound waves make a picture of the heart's valves and chambers. Your doctor may do this test to look for certain types of heart disease. Or it may be done to see how disease is affecting your heart. You will be given medicine to make you sleepy and comfortable during the test. The doctor puts a small, flexible tube into your throat and guides it to the esophagus. This is the tube that connects your mouth to your stomach. The doctor will ask you to swallow as the tube goes down. The transducer is at the tip of the tube. It gets close to your heart to make clear pictures. The doctor will look at the ultrasound pictures on a screen. You will not be able to eat or drink until the numbness from the throat spray wears off. Your throat may be sore for a few days after the test.  Follow-up care is a key part of your treatment and safety. Be sure to make and go to all appointments, and call your doctor if you are having problems. It's also a good idea to know your test results and keep a list of the medicines you take.                             [address]/CARDIOLOGY        Patient Name: Karen Rosa   : 1950   MRN# [id number]    Transesophageal Echocardiogram with Cardioversion with Tikosyn Loading Day of Procedure Cath Lab Holding Area Preop Orders:    ? YOU HAVE MY PERMISSION TO TALK TO THE PATIENT-PLEASE    ? Anesthesia    ? SEEMA with Anesthesia    ? IV peripheral saline lock  (preferred left arm). ? STAT LABS ON ARRIVAL: BMP, MAG, PHOS, CBC, PT INR, PTT    ? If taking Coumadin, PT INR  STAT on arrival day of procedure. ? 12 lead EKG STAT on arrival day of procedure. ? Diabetic patients >> Accu check Blood sugar check. ? Document home medications in EPIC and include date and time of last dose.    ? NPO.    ? Notify Dr. Fatmata Alberto of abnormal lab results. ? Chest Prep> Clip hair anterior chest and posterior back. ?   ? If Tikosyn or Sotalol loading  Planned >>3 Puma bed          Physician Signature:_______________________Date:___________Time:____________                            [facility]) Informed Consent for Anesthesia/Sedation, Surgery, Invasive Procedures, and other High-risk Interventions and Medication use     *This consent is applicable for 30 days following patient signature*    Procedure(s)   IMorena authorize, Dr. Gisele Paz   and the associate(s) or assistant(s) of his/her choice, to perform the following procedure(s): Transesophageal Echocardiogram with Cardioversion with Tikosyn Loading    I know that unexpected conditions may require additional or different procedures than those above. I authorize the above named practitioner(s) perform these as necessary and desirable. This is based on the practitioners professional judgment. The above named practitioner has discussed the above procedure(s) with me, including:  ? Potential benefits, including likelihood of success of the procedure(s) goals  ? Risks  ? Side effects, risk of death, and risk of infection  ? Any potential problems that might occur during recuperation or healing post-procedure  ? Reasonable alternatives  ?  Risks of NOT performing the procedure(s) I acknowledge that no warranty or guarantee has been made to the results the procedure(s). I consent to the above named practitioner(s) providing additional services to me as deemed reasonable and necessary, including but not limited to:    ? Use of medications for anesthesia or sedation. ? All anesthesia and sedation carry risks. My practitioner has discussed my anticipated anesthesia and/or sedation and the risks of using, risk of not using, benefits, side effects, and alternatives. ? Use of pathology  ? I authorize [facility] to dispose of tissues, specimens or organs when pathology is complete. ? Use of radiology  ? A contrast agent may be required for radiology procedures. My practitioner has advised me of the risks of using, risks of not using, benefits, side effects, and alternatives. ? Observers or use of photography, video/audio recording, or televising of the procedure(s). This is for medical, scientific, or educational purposes. This includes appropriate portions of my body. My identity will not be revealed. ? I consent to release of my social security number and other identifying information to [employer] (FDA), and the supplier/, if I receive tissue, a device, or implant. This is to track the tissue, device, or implant for defect, recall, infection, etc.     ? Use of blood and/or blood products, if needed, through my hospital stay. My practitioner has advised me of the risks of using, risks of not using, benefits, side effects, and alternatives. ___ I do NOT want Blood or Blood products given. (Complete separate  refusal form)    Code Status (ganga one):  ___ I do NOT HAVE a DNR order. I am a Full-code.   I will receive CPR, intubation,  chest compressions, medications, and/or other life saving measures if I have a  cardiac or respiratory arrest.    ___ I have a Do Not Resuscitate (DNR)order.   (ganga one below) ___  I rescind my DNR for surgery and immediate post-operative period through Phase 2 recovery. This means, for that time period, I will be a Full-code and receive CPR, intubation, chest compressions, medications, and/or other life saving measures, if I have a cardiac or respiratory arrest.    ___ I WANT to keep my DNR in effect during my procedure(s) and immediate post-operative recovery period through Phase 2 recovery. (Complete separate refusal form)     This form has been fully explained to me. I understand its contents. Patients Signature: ___________________________Date: ________  Time: ________    If patient unable to sign, has engaged the [address] [place] [address], is a minor, or has a court-appointed Guardian:  [facility] Representative Name (Print):  ____________________________________      Relationship ([place] one):    Guardian   Parent    Spouse    HCPOA   Child   Sibling  Next-of-Kin Friend    Patients Representative Signature: _______________________________________              Date: ______________  Time: __________    An  was used.    name/ID: _________________________________      [facility]) Witness________________________  Date: ________   Time: _________    Physician/Practitioner _______________________  Date: ________   Time: _________           Revision 2017      [facility]     Dr. Mariella Fernandez     PROCEDURE TO SCHEDULE:    Transesophageal Echocardiogram with Cardioversion with Tikosyn Loading     Patient Name: Krystal Jeronimo  : 1950   MRN# [id number]    Home Phone Number: [phone]   Weight:    Wt Readings from Last 3 Encounters:   10/14/20 191 lb 3.2 oz (86.7 kg)   20 189 lb (85.7 kg)   20 199 lb 4.8 oz (90.4 kg)        Insurance: Payor: Florence Burows / Plan: MEDICARE PART A AND B / Product Type: *No Product type* /     Date of Procedure: 20 Time: 1879 Arrival Time: 0709 Diagnosis:  Atrial Fibrillation   Allergies: No Known Allergies       1) Call [facility] scheduling ([phone]) or Instant Message  CONFIRMED WITH     PHONE OR   INSTANT MESSAGE  2) PREAUTHORIZATION NUMBER:    Spoke to:      From date:     expiration date:          Kathy Aldana

## 2020-10-21 ENCOUNTER — TELEPHONE (OUTPATIENT)
Dept: CARDIOLOGY CLINIC | Age: 70
End: 2020-10-21

## 2020-10-21 NOTE — TELEPHONE ENCOUNTER
Patient called stating that he is scheduled for a procedure with Salome Caban but he wants to have a sit down with him before he goes through with the procedure; please return his call at ph# [phone].

## 2020-10-25 ASSESSMENT — ENCOUNTER SYMPTOMS
VOMITING: 0
BACK PAIN: 0
NAUSEA: 0
EYE PAIN: 0
CHEST TIGHTNESS: 0
SHORTNESS OF BREATH: 0
PHOTOPHOBIA: 0
COUGH: 0
CONSTIPATION: 0
BLOOD IN STOOL: 0
DIARRHEA: 0
ABDOMINAL PAIN: 0
WHEEZING: 0
COLOR CHANGE: 0

## 2020-11-04 ENCOUNTER — OFFICE VISIT (OUTPATIENT)
Dept: CARDIOLOGY CLINIC | Age: 70
End: 2020-11-04
Payer: MEDICARE

## 2020-11-04 ENCOUNTER — HOSPITAL ENCOUNTER (OUTPATIENT)
Age: 70
Setting detail: SPECIMEN
Discharge: HOME | End: 2020-11-04
Payer: MEDICARE

## 2020-11-04 VITALS
RESPIRATION RATE: 12 BRPM | HEART RATE: 68 BPM | DIASTOLIC BLOOD PRESSURE: 70 MMHG | HEIGHT: 76 IN | SYSTOLIC BLOOD PRESSURE: 110 MMHG | TEMPERATURE: 97.2 F | WEIGHT: 189.2 LBS | BODY MASS INDEX: 23.04 KG/M2

## 2020-11-04 PROCEDURE — 93000 ELECTROCARDIOGRAM COMPLETE: CPT | Performed by: INTERNAL MEDICINE

## 2020-11-04 PROCEDURE — 3017F COLORECTAL CA SCREEN DOC REV: CPT | Performed by: INTERNAL MEDICINE

## 2020-11-04 PROCEDURE — 99212 OFFICE O/P EST SF 10 MIN: CPT | Performed by: INTERNAL MEDICINE

## 2020-11-04 PROCEDURE — G8420 CALC BMI NORM PARAMETERS: HCPCS | Performed by: INTERNAL MEDICINE

## 2020-11-04 PROCEDURE — G8482 FLU IMMUNIZE ORDER/ADMIN: HCPCS | Performed by: INTERNAL MEDICINE

## 2020-11-04 PROCEDURE — U0002 COVID-19 LAB TEST NON-CDC: HCPCS

## 2020-11-04 PROCEDURE — G8427 DOCREV CUR MEDS BY ELIG CLIN: HCPCS | Performed by: INTERNAL MEDICINE

## 2020-11-04 PROCEDURE — 4040F PNEUMOC VAC/ADMIN/RCVD: CPT | Performed by: INTERNAL MEDICINE

## 2020-11-04 PROCEDURE — 1036F TOBACCO NON-USER: CPT | Performed by: INTERNAL MEDICINE

## 2020-11-04 PROCEDURE — 1123F ACP DISCUSS/DSCN MKR DOCD: CPT | Performed by: INTERNAL MEDICINE

## 2020-11-04 NOTE — PROGRESS NOTES
Electrophysiology Consult Note      Reason for consultation:  Atrial fibrillation    Chief complaint :  Fluttering    Referring physician: Dr. Steven Sarmiento      Primary care physician: No primary care provider on file. History of Present Illness: This visit (1)      Chief Complaint   Patient presents with    Other     Patient is Dr. Steven Sarmiento patient. Patient is here to talk about tikosyn loading he is to have on Monday. Patient states he having palpitations and Shortness of breath. Patient states he is not smoking nor drinking, alcohol or caffiiene. He has questions regarding procedure so he wanted to see in person again           Previous visit:    Patient is a 69 yr old male with hx of atrial fibrillation referred here for management of atrial fibrillation. Pt states he feels fluttering and skipping when laying down. Tiredness and weakness. Pt denies chest pain, shortness of breath, dizziness, and edema. Pt drinks two cups of coffee daily.       Pastmedical history:   Past Medical History:   Diagnosis Date    Atrial fibrillation [facility])     follows with Dr Nga Gatica CHF (congestive heart failure) ([facility] [address] 03/2020    Enlarged prostate     H/O echocardiogram 05/21/2020    Mild prolapse anterior mitral valve leaflet,EF20%,no thrombus    H/O percutaneous left heart catheterization     [place] (hard of hearing)     no hearing aides    Hypotension     Indwelling catheter present on admission     \"had catheter in since March 2020\"    Iron deficiency anemia     PONV (postoperative nausea and vomiting)     \"sick in 1981 but did fine after that with other surgeries\"    UTI (urinary tract infection)     week 7/20/2020- was on antibiotic- had repeat urine test 7/27/2020 but never heard the results\"    Wears glasses        Surgical history :   Past Surgical History:   Procedure Laterality Date    ARM SURGERY Left 1981    \"have plate and screws still in place\"    CARDIAC CATHETERIZATION      per old chart had cath 3/2020    CARDIOVERSION  05/2020    \"had cardioversion- worked for some time but the atrial fib is back\"    COLONOSCOPY  2010    EYE SURGERY  2015    left eye cataract ext     TURP N/A 8/6/2020    CYSTOSCOPY W/BIPOLAR TURP REMOVAL OF BLADDER STONES performed by Adrian Amaral MD at [facility] OR       Family history:   Family History   Problem Relation Age of Onset    Cancer Mother         thyroid cancer    Cancer Father         lymphoma       Social history :  reports that he has never smoked. He has never used smokeless tobacco. He reports previous alcohol use. Drug: Marijuana. No Known Allergies    Current Outpatient Medications on File Prior to Visit   Medication Sig Dispense Refill    apixaban (ELIQUIS) 5 MG TABS tablet Take 1 tablet by mouth 2 times daily 28 tablet 0    lisinopril (PRINIVIL;ZESTRIL) 2.5 MG tablet Take 1 tablet by mouth daily 90 tablet 3     No current facility-administered medications on file prior to visit. Review of Systems:   Review of Systems   Constitutional: Positive for fatigue. Negative for activity change, chills and fever. HENT: Negative for congestion, ear pain and tinnitus. Eyes: Negative for photophobia, pain and visual disturbance. Respiratory: Negative for cough, chest tightness, shortness of breath and wheezing. Cardiovascular: Positive for palpitations. Negative for chest pain and leg swelling. Gastrointestinal: Negative for abdominal pain, blood in stool, constipation, diarrhea, nausea and vomiting. Endocrine: Negative for cold intolerance and heat intolerance. Genitourinary: Negative for dysuria, flank pain and hematuria. Musculoskeletal: Positive for arthralgias. Negative for back pain, myalgias and neck stiffness. Skin: Negative for color change and rash. Allergic/Immunologic: Negative for food allergies. Neurological: Positive for weakness.  Negative for dizziness, light-headedness, numbness 03/06/2020    PROT 6.5 03/06/2020    BILITOT 1.2 (H) 03/06/2020    ALKPHOS 56 03/06/2020     TSH:  No results found for: TSH    EKGINTERPRETATION - EKG Interpretation:  Atrial fibrillation      IMPRESSION / RECOMMENDATIONS:     Persistent atrial fibrillation   Non ischemic cardiomyopathy      Patient in persistent atrial fibrillation and nonischemic cardiomyopathy    Patient had multiple questions regarding the Tikosyn and other options discussed about ablation finally patient decided to proceed with SEEMA cardioversion and Tikosyn loading    Recommend SEEMA, cardioversion with antiarrhythmic versus ablation    We had detailed discussion and answered all patient questions and he chose to consider medical therapy first    Thanks again for allowing me to participate in care of this patient. Please call me if you have any questions. With best regards. Peter Schroeder MD    Please note this report has been partially produced using speech recognition software and may contain errors related to that system including errors in grammar, punctuation, and spelling, as well as words and phrases that may be inappropriate. If there are any questions or concerns please feel free to contact the dictating provider for clarification.

## 2020-11-06 ENCOUNTER — ANESTHESIA EVENT (OUTPATIENT)
Dept: CARDIAC CATH/INVASIVE PROCEDURES | Age: 70
DRG: 309 | End: 2020-11-06
Payer: MEDICARE

## 2020-11-06 NOTE — ANESTHESIA PRE PROCEDURE
Department of Anesthesiology  Preprocedure Note       Name:  Tam Chávez   Age:  69 y.o.  :  1950                                          MRN:  [id number]         Date:  2020      Surgeon: * Surgery not found *    Procedure:     Medications prior to admission:   Prior to Admission medications    Medication Sig Start Date End Date Taking? Authorizing Provider   apixaban (ELIQUIS) 5 MG TABS tablet Take 1 tablet by mouth 2 times daily 20   Lilliam Moreira MD   metoprolol succinate (TOPROL XL) 25 MG extended release tablet Take 1 tablet by mouth daily Takes with 100mg - 125mg daily 20   ROSALINO Hale CNP   lisinopril (PRINIVIL;ZESTRIL) 2.5 MG tablet Take 1 tablet by mouth daily 20   ROSALINO Hale CNP   digoxin (LANOXIN) 125 MCG tablet Take 1 tablet by mouth daily 20   ROSALINO Hale CNP   metoprolol succinate (TOPROL XL) 100 MG extended release tablet Take 1 tablet by mouth daily Takes with 25mg - total 125mg daily 20   ROSALINO Hale CNP       Current medications:    Current Outpatient Medications   Medication Sig Dispense Refill    apixaban (ELIQUIS) 5 MG TABS tablet Take 1 tablet by mouth 2 times daily 28 tablet 0    metoprolol succinate (TOPROL XL) 25 MG extended release tablet Take 1 tablet by mouth daily Takes with 100mg - 125mg daily 90 tablet 3    lisinopril (PRINIVIL;ZESTRIL) 2.5 MG tablet Take 1 tablet by mouth daily 90 tablet 3    digoxin (LANOXIN) 125 MCG tablet Take 1 tablet by mouth daily 90 tablet 3    metoprolol succinate (TOPROL XL) 100 MG extended release tablet Take 1 tablet by mouth daily Takes with 25mg - total 125mg daily 90 tablet 3     No current facility-administered medications for this encounter.         Allergies:  No Known Allergies    Problem List:    Patient Active Problem List   Diagnosis Code    Atrial fibrillation with RVR ([facility]) I48.91    Chronic combined systolic and diastolic congestive heart failure ([facility] I50.42    Essential hypertension I10    Thrombus of left atrial appendage I51.3    Nonischemic cardiomyopathy (HCC) I42.8    Persistent atrial fibrillation (HCC) I48.19    Paroxysmal atrial fibrillation (HCC) I48.0    BPH with urinary obstruction N40.1, N13.8    S/P TURP Z90.79       Past Medical History:        Diagnosis Date    Atrial fibrillation ([facility]     follows with Dr Tomasz Montanez CHF (congestive heart failure) ([facility] 03/2020    Enlarged prostate     H/O echocardiogram 05/21/2020    Mild prolapse anterior mitral valve leaflet,EF20%,no thrombus    H/O percutaneous left heart catheterization     [place] (hard of hearing)     no hearing aides    Hypotension     Indwelling catheter present on admission     \"had catheter in since March 2020\"    Iron deficiency anemia     PONV (postoperative nausea and vomiting)     \"sick in 1981 but did fine after that with other surgeries\"    UTI (urinary tract infection)     week 7/20/2020- was on antibiotic- had repeat urine test 7/27/2020 but never heard the results\"    Wears glasses        Past Surgical History:        Procedure Laterality Date    ARM SURGERY Left 1981    \"have plate and screws still in place\"    CARDIAC CATHETERIZATION      per old chart had cath 3/2020    CARDIOVERSION  05/2020    \"had cardioversion- worked for some time but the atrial fib is back\"    COLONOSCOPY  2010    EYE SURGERY  2015    left eye cataract ext     TURP N/A 8/6/2020    CYSTOSCOPY W/BIPOLAR TURP REMOVAL OF BLADDER STONES performed by Melissa Rodrigues MD at [address] [place] [address] History:    Social History     Tobacco Use    Smoking status: Never Smoker    Smokeless tobacco: Never Used   Substance Use Topics    Alcohol use: Not Currently     Comment: \"drink 1-2 beers per day\"                                Counseling given: Not Answered      Vital Signs (Current): There were no vitals filed for this visit.                                            BP Readings from Last 3 Encounters:   11/04/20 110/70   10/14/20 120/70   09/30/20 120/64       NPO Status:                                                                                 BMI:   Wt Readings from Last 3 Encounters:   11/04/20 189 lb 3.2 oz (85.8 kg)   10/14/20 191 lb 3.2 oz (86.7 kg)   09/30/20 189 lb (85.7 kg)     There is no height or weight on file to calculate BMI.    CBC:   Lab Results   Component Value Date    WBC 14.5 08/07/2020    RBC 3.81 08/07/2020    HGB 12.2 08/07/2020    HCT 36.4 08/07/2020    MCV 95.5 08/07/2020    RDW 13.0 08/07/2020     08/07/2020       CMP:   Lab Results   Component Value Date     07/31/2020    K 4.7 07/31/2020     07/31/2020    CO2 22 07/31/2020    BUN 18 07/31/2020    CREATININE 1.3 07/31/2020    GFRAA >60 07/31/2020    LABGLOM 54 07/31/2020    GLUCOSE 124 07/31/2020    PROT 6.5 03/06/2020    CALCIUM 9.5 07/31/2020    BILITOT 1.2 03/06/2020    ALKPHOS 56 03/06/2020    AST 55 03/06/2020    ALT 48 03/06/2020       POC Tests: No results for input(s): POCGLU, POCNA, POCK, POCCL, POCBUN, POCHEMO, POCHCT in the last 72 hours. Coags:   Lab Results   Component Value Date    PROTIME 17.7 03/06/2020    INR 1.46 03/06/2020       HCG (If Applicable): No results found for: PREGTESTUR, PREGSERUM, HCG, HCGQUANT     ABGs: No results found for: PHART, PO2ART, OBC4BSO, TND3KMG, BEART, B3ZMIMTN     Type & Screen (If Applicable):  No results found for: LABABO, LABRH    Drug/Infectious Status (If Applicable):  No results found for: HIV, HEPCAB    COVID-19 Screening (If Applicable):   Lab Results   Component Value Date    COVID19 NOT DETECTED 07/31/2020         Anesthesia Evaluation     history of anesthetic complications: PONV.   Airway: Mallampati: III        Dental:          Pulmonary:                              Cardiovascular:  Exercise tolerance: poor (<4 METS),   (+) hypertension:, valvular problems/murmurs: MVP, dysrhythmias: atrial fibrillation, CHF:,          Beta Blocker:  Order written and Not on Beta Blocker      ROS comment: Echo 5/2020:   Summary   Left ventricular systolic function is abnormal.   Ejection fraction is visually estimated at 20%. Mild prolapse of the anterior mitral valve leaflet. There was no thrombus noted in the left atrial appendage. Successful cardioversion using 200 joules. 3/2020: Diagnostic procedure:Right Coronary Angiography, Left Coronary   Angiography      Conclusions      Procedure Summary   NO CAD   [address] CARDIOMYOPATHY      Recommendations   MED THERAPY   RESTART ANTICOAG LATER     Neuro/Psych:   Negative Neuro/Psych ROS              GI/Hepatic/Renal:             Endo/Other:    (+) blood dyscrasia: anemia:., .                 Abdominal:           Vascular: negative vascular ROS. Anesthesia Plan      MAC and general     ASA 3       Induction: intravenous. Anesthetic plan and risks discussed with patient. ROSALINO Jeff CRNA   11/6/2020       Pre Anesthesia Assessment complete. Chart reviewed on 11/6/2020    Pre Anesthesia Evaluation complete. Anesthesia plan, risks, benefits, alternatives, and personnel discussed with patient and/or legal guardian. Patient and/or legal guardian verbalized an understanding and agreed to proceed. Anesthesia plan discussed with care team members and agreed upon.   ROSALINO Rosales CRNA  11/9/2020

## 2020-11-07 LAB
SARS-COV-2: NOT DETECTED
SOURCE: NORMAL

## 2020-11-09 ENCOUNTER — ANESTHESIA (OUTPATIENT)
Dept: CARDIAC CATH/INVASIVE PROCEDURES | Age: 70
DRG: 309 | End: 2020-11-09
Payer: MEDICARE

## 2020-11-09 ENCOUNTER — HOSPITAL ENCOUNTER (INPATIENT)
Dept: CARDIAC CATH/INVASIVE PROCEDURES | Age: 70
LOS: 3 days | Discharge: HOME | DRG: 309 | End: 2020-11-12
Attending: INTERNAL MEDICINE | Admitting: INTERNAL MEDICINE
Payer: MEDICARE

## 2020-11-09 VITALS
SYSTOLIC BLOOD PRESSURE: 148 MMHG | OXYGEN SATURATION: 100 % | RESPIRATION RATE: 9 BRPM | DIASTOLIC BLOOD PRESSURE: 90 MMHG

## 2020-11-09 PROBLEM — Z51.81 VISIT FOR MONITORING TIKOSYN THERAPY: Status: ACTIVE | Noted: 2020-11-09

## 2020-11-09 PROBLEM — Z79.899 VISIT FOR MONITORING TIKOSYN THERAPY: Status: ACTIVE | Noted: 2020-11-09

## 2020-11-09 LAB
ANION GAP SERPL CALCULATED.3IONS-SCNC: 10 MMOL/L (ref 4–16)
APTT: 33.7 SECONDS (ref 25.1–37.1)
BUN BLDV-MCNC: 15 MG/DL (ref 6–23)
CALCIUM SERPL-MCNC: 8.7 MG/DL (ref 8.3–10.6)
CHLORIDE BLD-SCNC: 102 MMOL/L (ref 99–110)
CO2: 25 MMOL/L (ref 21–32)
CREAT SERPL-MCNC: 1 MG/DL (ref 0.9–1.3)
EKG ATRIAL RATE: 192 BPM
EKG ATRIAL RATE: 56 BPM
EKG DIAGNOSIS: NORMAL
EKG DIAGNOSIS: NORMAL
EKG P AXIS: 74 DEGREES
EKG P-R INTERVAL: 268 MS
EKG Q-T INTERVAL: 392 MS
EKG Q-T INTERVAL: 436 MS
EKG QRS DURATION: 102 MS
EKG QRS DURATION: 94 MS
EKG QTC CALCULATION (BAZETT): 420 MS
EKG QTC CALCULATION (BAZETT): 441 MS
EKG R AXIS: 41 DEGREES
EKG R AXIS: 60 DEGREES
EKG T AXIS: 18 DEGREES
EKG T AXIS: 59 DEGREES
EKG VENTRICULAR RATE: 56 BPM
EKG VENTRICULAR RATE: 76 BPM
GFR AFRICAN AMERICAN: >60 ML/MIN/1.73M2
GFR NON-AFRICAN AMERICAN: >60 ML/MIN/1.73M2
GLUCOSE BLD-MCNC: 88 MG/DL (ref 70–99)
HCT VFR BLD CALC: 40.9 % (ref 42–52)
HEMOGLOBIN: 13.2 GM/DL (ref 13.5–18)
INR BLD: 1.24 INDEX
MAGNESIUM: 2 MG/DL (ref 1.8–2.4)
MCH RBC QN AUTO: 29.3 PG (ref 27–31)
MCHC RBC AUTO-ENTMCNC: 32.3 % (ref 32–36)
MCV RBC AUTO: 90.7 FL (ref 78–100)
PDW BLD-RTO: 13.3 % (ref 11.7–14.9)
PHOSPHORUS: 3.4 MG/DL (ref 2.5–4.9)
PLATELET # BLD: 173 K/CU MM (ref 140–440)
PMV BLD AUTO: 10.5 FL (ref 7.5–11.1)
POTASSIUM SERPL-SCNC: 4.3 MMOL/L (ref 3.5–5.1)
PROTHROMBIN TIME: 15 SECONDS (ref 11.7–14.5)
RBC # BLD: 4.51 M/CU MM (ref 4.6–6.2)
SODIUM BLD-SCNC: 137 MMOL/L (ref 135–145)
WBC # BLD: 7.5 K/CU MM (ref 4–10.5)

## 2020-11-09 PROCEDURE — 6360000002 HC RX W HCPCS

## 2020-11-09 PROCEDURE — 85027 COMPLETE CBC AUTOMATED: CPT

## 2020-11-09 PROCEDURE — 93312 ECHO TRANSESOPHAGEAL: CPT | Performed by: INTERNAL MEDICINE

## 2020-11-09 PROCEDURE — 6370000000 HC RX 637 (ALT 250 FOR IP): Performed by: INTERNAL MEDICINE

## 2020-11-09 PROCEDURE — 93005 ELECTROCARDIOGRAM TRACING: CPT | Performed by: INTERNAL MEDICINE

## 2020-11-09 PROCEDURE — 83735 ASSAY OF MAGNESIUM: CPT

## 2020-11-09 PROCEDURE — 85730 THROMBOPLASTIN TIME PARTIAL: CPT

## 2020-11-09 PROCEDURE — 2580000003 HC RX 258: Performed by: NURSE ANESTHETIST, CERTIFIED REGISTERED

## 2020-11-09 PROCEDURE — 85610 PROTHROMBIN TIME: CPT

## 2020-11-09 PROCEDURE — 2709999900 HC NON-CHARGEABLE SUPPLY

## 2020-11-09 PROCEDURE — 93325 DOPPLER ECHO COLOR FLOW MAPG: CPT | Performed by: INTERNAL MEDICINE

## 2020-11-09 PROCEDURE — 93010 ELECTROCARDIOGRAM REPORT: CPT | Performed by: INTERNAL MEDICINE

## 2020-11-09 PROCEDURE — 3700000000 HC ANESTHESIA ATTENDED CARE

## 2020-11-09 PROCEDURE — B24BZZ4 ULTRASONOGRAPHY OF HEART WITH AORTA, TRANSESOPHAGEAL: ICD-10-PCS | Performed by: INTERNAL MEDICINE

## 2020-11-09 PROCEDURE — 92960 CARDIOVERSION ELECTRIC EXT: CPT

## 2020-11-09 PROCEDURE — 93306 TTE W/DOPPLER COMPLETE: CPT

## 2020-11-09 PROCEDURE — 80048 BASIC METABOLIC PNL TOTAL CA: CPT

## 2020-11-09 PROCEDURE — 93312 ECHO TRANSESOPHAGEAL: CPT

## 2020-11-09 PROCEDURE — 2500000003 HC RX 250 WO HCPCS: Performed by: NURSE ANESTHETIST, CERTIFIED REGISTERED

## 2020-11-09 PROCEDURE — 5A2204Z RESTORATION OF CARDIAC RHYTHM, SINGLE: ICD-10-PCS | Performed by: INTERNAL MEDICINE

## 2020-11-09 PROCEDURE — 1200000000 HC SEMI PRIVATE

## 2020-11-09 PROCEDURE — 6360000002 HC RX W HCPCS: Performed by: NURSE ANESTHETIST, CERTIFIED REGISTERED

## 2020-11-09 PROCEDURE — 84100 ASSAY OF PHOSPHORUS: CPT

## 2020-11-09 PROCEDURE — 3700000001 HC ADD 15 MINUTES (ANESTHESIA)

## 2020-11-09 RX ORDER — PROPOFOL 10 MG/ML
INJECTION, EMULSION INTRAVENOUS PRN
Status: DISCONTINUED | OUTPATIENT
Start: 2020-11-09 | End: 2020-11-09 | Stop reason: SDUPTHER

## 2020-11-09 RX ORDER — LIDOCAINE HYDROCHLORIDE 20 MG/ML
INJECTION, SOLUTION EPIDURAL; INFILTRATION; INTRACAUDAL; PERINEURAL PRN
Status: DISCONTINUED | OUTPATIENT
Start: 2020-11-09 | End: 2020-11-09 | Stop reason: SDUPTHER

## 2020-11-09 RX ORDER — DOFETILIDE 0.5 MG/1
500 CAPSULE ORAL ONCE
Status: COMPLETED | OUTPATIENT
Start: 2020-11-09 | End: 2020-11-09

## 2020-11-09 RX ORDER — SODIUM CHLORIDE 9 MG/ML
INJECTION, SOLUTION INTRAVENOUS CONTINUOUS
Status: DISCONTINUED | OUTPATIENT
Start: 2020-11-09 | End: 2020-11-09

## 2020-11-09 RX ORDER — DOFETILIDE 0.25 MG/1
250 CAPSULE ORAL EVERY 12 HOURS SCHEDULED
Status: DISCONTINUED | OUTPATIENT
Start: 2020-11-10 | End: 2020-11-12 | Stop reason: HOSPADM

## 2020-11-09 RX ORDER — METOPROLOL SUCCINATE 50 MG/1
50 TABLET, EXTENDED RELEASE ORAL DAILY
Status: DISCONTINUED | OUTPATIENT
Start: 2020-11-09 | End: 2020-11-10

## 2020-11-09 RX ORDER — SODIUM CHLORIDE 9 MG/ML
INJECTION, SOLUTION INTRAVENOUS CONTINUOUS PRN
Status: DISCONTINUED | OUTPATIENT
Start: 2020-11-09 | End: 2020-11-09 | Stop reason: SDUPTHER

## 2020-11-09 RX ORDER — LISINOPRIL 2.5 MG/1
2.5 TABLET ORAL DAILY
Status: DISCONTINUED | OUTPATIENT
Start: 2020-11-09 | End: 2020-11-12 | Stop reason: HOSPADM

## 2020-11-09 RX ADMIN — LIDOCAINE HYDROCHLORIDE 50 MG: 20 INJECTION, SOLUTION EPIDURAL; INFILTRATION; INTRACAUDAL; PERINEURAL at 13:25

## 2020-11-09 RX ADMIN — APIXABAN 5 MG: 5 TABLET, FILM COATED ORAL at 20:39

## 2020-11-09 RX ADMIN — SODIUM CHLORIDE: 900 INJECTION INTRAVENOUS at 13:20

## 2020-11-09 RX ADMIN — LIDOCAINE HYDROCHLORIDE 50 MG: 20 INJECTION, SOLUTION EPIDURAL; INFILTRATION; INTRACAUDAL; PERINEURAL at 13:24

## 2020-11-09 RX ADMIN — PROPOFOL 50 MG: 10 INJECTION, EMULSION INTRAVENOUS at 13:25

## 2020-11-09 RX ADMIN — DOFETILIDE 500 MCG: 0.5 CAPSULE ORAL at 14:34

## 2020-11-09 RX ADMIN — PROPOFOL 30 MG: 10 INJECTION, EMULSION INTRAVENOUS at 13:26

## 2020-11-09 ASSESSMENT — ENCOUNTER SYMPTOMS
COLOR CHANGE: 0
BLOOD IN STOOL: 0
WHEEZING: 0
SHORTNESS OF BREATH: 0
EYE PAIN: 0
CHEST TIGHTNESS: 0
NAUSEA: 0
BACK PAIN: 0
CONSTIPATION: 0
PHOTOPHOBIA: 0
DIARRHEA: 0
COUGH: 0
VOMITING: 0
ABDOMINAL PAIN: 0

## 2020-11-09 ASSESSMENT — PULMONARY FUNCTION TESTS
PIF_VALUE: 1
PIF_VALUE: 35
PIF_VALUE: 1
PIF_VALUE: 1

## 2020-11-09 NOTE — PROGRESS NOTES
Pt transported to [facility], bedside report given to [address] no complaints/concerns.

## 2020-11-09 NOTE — H&P
Electrophysiology Consult Note      Reason for consultation:  Atrial fibrillation    Chief complaint :  Fluttering    Referring physician: Dr. Hailey Reese      Primary care physician: No primary care provider on file. History of Present Illness: Today visit (11/09/20)    Patient here for SEEMA/Cardioversion and tikosyn therapy. No change in H&P noted from previous clinic visit. Previous visit:     Patient is a 69 yr old male with hx of atrial fibrillation referred here for management of atrial fibrillation. Pt states he feels fluttering and skipping when laying down. Tiredness and weakness. Pt denies chest pain, shortness of breath, dizziness, and edema. Pt drinks two cups of coffee daily.       Pastmedical history:   Past Medical History:   Diagnosis Date    Atrial fibrillation [facility])     follows with Dr Whit Mcgarry CHF (congestive heart failure) ([facility] [address] 03/2020    Enlarged prostate     H/O echocardiogram 05/21/2020    Mild prolapse anterior mitral valve leaflet,EF20%,no thrombus    H/O percutaneous left heart catheterization     [place] (hard of hearing)     no hearing aides    Hypotension     Indwelling catheter present on admission     \"had catheter in since March 2020\"    Iron deficiency anemia     PONV (postoperative nausea and vomiting)     \"sick in 1981 but did fine after that with other surgeries\"    UTI (urinary tract infection)     week 7/20/2020- was on antibiotic- had repeat urine test 7/27/2020 but never heard the results\"    Wears glasses        Surgical history :   Past Surgical History:   Procedure Laterality Date    ARM SURGERY Left 1981    \"have plate and screws still in place\"   [address]      per old chart had cath 3/2020    CARDIOVERSION  05/2020    \"had cardioversion- worked for some time but the atrial fib is back\"    COLONOSCOPY  2010    EYE SURGERY  2015    left eye cataract ext     TURP N/A 8/6/2020    CYSTOSCOPY W/BIPOLAR TURP REMOVAL OF BLADDER STONES performed by Desiree Reynolds MD at [facility] OR       Family history:   Family History   Problem Relation Age of Onset    Cancer Mother         thyroid cancer    Cancer Father         lymphoma       Social history :  reports that he has never smoked. He has never used smokeless tobacco. He reports previous alcohol use. Drug: Marijuana. No Known Allergies    No current facility-administered medications on file prior to encounter. Current Outpatient Medications on File Prior to Encounter   Medication Sig Dispense Refill    apixaban (ELIQUIS) 5 MG TABS tablet Take 1 tablet by mouth 2 times daily 28 tablet 0    metoprolol succinate (TOPROL XL) 25 MG extended release tablet Take 1 tablet by mouth daily Takes with 100mg - 125mg daily 90 tablet 3    lisinopril (PRINIVIL;ZESTRIL) 2.5 MG tablet Take 1 tablet by mouth daily 90 tablet 3    metoprolol succinate (TOPROL XL) 100 MG extended release tablet Take 1 tablet by mouth daily Takes with 25mg - total 125mg daily 90 tablet 3    digoxin (LANOXIN) 125 MCG tablet Take 1 tablet by mouth daily 90 tablet 3       Review of Systems:   Review of Systems   Constitutional: Positive for fatigue. Negative for activity change, chills and fever. HENT: Negative for congestion, ear pain and tinnitus. Eyes: Negative for photophobia, pain and visual disturbance. Respiratory: Negative for cough, chest tightness, shortness of breath and wheezing. Cardiovascular: Positive for palpitations. Negative for chest pain and leg swelling. Gastrointestinal: Negative for abdominal pain, blood in stool, constipation, diarrhea, nausea and vomiting. Endocrine: Negative for cold intolerance and heat intolerance. Genitourinary: Negative for dysuria, flank pain and hematuria. Musculoskeletal: Positive for arthralgias. Negative for back pain, myalgias and neck stiffness. Skin: Negative for color change and rash.    Allergic/Immunologic: Negative for food allergies. Neurological: Positive for weakness. Negative for dizziness, light-headedness, numbness and headaches. Hematological: Does not bruise/bleed easily. Psychiatric/Behavioral: Negative for agitation, behavioral problems and confusion. Examination:      Vitals:    11/09/20 1112 11/09/20 1116   BP: 126/83    Pulse: 66 72  Comment: 72   Resp: 16    Temp: 96.9 °F (36.1 °C)    TempSrc: Temporal    SpO2: 100%    Weight: 189 lb (85.7 kg)    Height: 6' 4\" (1.93 m)         Body mass index is 23.01 kg/m². Physical Exam  Constitutional:       General: He is not in acute distress. Appearance: He is well-developed. HENT:      Head: Normocephalic and atraumatic. Eyes:      Pupils: Pupils are equal, round, and reactive to light. Neck:      Musculoskeletal: Normal range of motion. Vascular: No JVD. Cardiovascular:      Rate and Rhythm: Normal rate. Rhythm irregular. Heart sounds: Murmur present. No friction rub. Comments: Grade 2/6 systolic murmur  Pulmonary:      Effort: Pulmonary effort is normal. No respiratory distress. Breath sounds: Normal breath sounds. No wheezing or rales. Abdominal:      General: Bowel sounds are normal. There is no distension. Palpations: Abdomen is soft. Tenderness: There is no abdominal tenderness. Musculoskeletal:         General: No tenderness. Skin:     General: Skin is warm and dry. Neurological:      Mental Status: He is alert and oriented to person, place, and time. Cranial Nerves: No cranial nerve deficit.                CBC:   Lab Results   Component Value Date    WBC 7.5 11/09/2020    HGB 13.2 11/09/2020    HCT 40.9 11/09/2020     11/09/2020     Lipids:  Lab Results   Component Value Date    CHOL 150 03/10/2020    TRIG 83 03/10/2020    HDL 44 03/10/2020    LDLDIRECT 99 03/10/2020     PT/INR:   Lab Results   Component Value Date    INR 1.24 11/09/2020        BMP:    Lab Results   Component Value Date     11/09/2020    K 4.3 11/09/2020     11/09/2020    CO2 25 11/09/2020    BUN 15 11/09/2020     CMP:   Lab Results   Component Value Date    AST 55 (H) 03/06/2020    PROT 6.5 03/06/2020    BILITOT 1.2 (H) 03/06/2020    ALKPHOS 56 03/06/2020     TSH:  No results found for: TSH    EKGINTERPRETATION - EKG Interpretation:  Atrial fibrillation      IMPRESSION / RECOMMENDATIONS:     Persistent atrial fibrillation   Non ischemic cardiomyopathy      Patient in persistent atrial fibrillation and nonischemic cardiomyopathy    Recommend SEEMA, cardioversion with antiarrhythmic versus ablation    We had detailed discussion and answered all patient questions and he chose to consider medical therapy first    Thanks again for allowing me to participate in care of this patient. Please call me if you have any questions. With best regards. Danyelle Hernandez MD, 11/9/2020 1:37 PM     Please note this report has been partially produced using speech recognition software and may contain errors related to that system including errors in grammar, punctuation, and spelling, as well as words and phrases that may be inappropriate. If there are any questions or concerns please feel free to contact the dictating provider for clarification.

## 2020-11-09 NOTE — PROCEDURES
Catina Prince   69 y.o., male  1950 11/9/2020    Attending: Benito Cranker. MD    Sedation : Anesthesia                        Indication:   Persistent atrial fibrillation                                                                                                                                                      After obtaining the informed cosent. Pt brought to EP lab. Sedation per anesthesia . After proper sedation DIANNE performed. US Doppler was used to assess abnormalities where appropriate. Post procedure pt is stable.       Findings:  LV= appears reduced LVEF -LVEF was not accurately assessed given the dianne views -esophagus is more left villeda I think  LA=  dilated  ALEJANDRA= NO CLOTS  RV= normal  RA=  normal  IAS= Normal  Bubble study=not donefor PFO or ASD  AV=tricuspid, trivial regurgitation and no stenosis  MV= mild to moderate regurgitation, no stenosis  TV=mild regurgitation  PV= no significant regurgitation,   Aorta=mild stable plaque noted  PUL ADITI FLOW=systolic blunting noted    Impression:  No ALEJANDRA clot    Plan:  Proceed with cardioversion

## 2020-11-10 LAB
EKG ATRIAL RATE: 51 BPM
EKG ATRIAL RATE: 53 BPM
EKG ATRIAL RATE: 60 BPM
EKG DIAGNOSIS: NORMAL
EKG P AXIS: 80 DEGREES
EKG P AXIS: 81 DEGREES
EKG P AXIS: 82 DEGREES
EKG P-R INTERVAL: 236 MS
EKG P-R INTERVAL: 262 MS
EKG P-R INTERVAL: 266 MS
EKG Q-T INTERVAL: 448 MS
EKG Q-T INTERVAL: 462 MS
EKG Q-T INTERVAL: 472 MS
EKG QRS DURATION: 92 MS
EKG QRS DURATION: 94 MS
EKG QRS DURATION: 96 MS
EKG QTC CALCULATION (BAZETT): 425 MS
EKG QTC CALCULATION (BAZETT): 442 MS
EKG QTC CALCULATION (BAZETT): 448 MS
EKG R AXIS: 47 DEGREES
EKG R AXIS: 52 DEGREES
EKG R AXIS: 72 DEGREES
EKG T AXIS: 43 DEGREES
EKG T AXIS: 56 DEGREES
EKG T AXIS: 58 DEGREES
EKG VENTRICULAR RATE: 51 BPM
EKG VENTRICULAR RATE: 53 BPM
EKG VENTRICULAR RATE: 60 BPM

## 2020-11-10 PROCEDURE — 94761 N-INVAS EAR/PLS OXIMETRY MLT: CPT

## 2020-11-10 PROCEDURE — 6370000000 HC RX 637 (ALT 250 FOR IP): Performed by: INTERNAL MEDICINE

## 2020-11-10 PROCEDURE — 93005 ELECTROCARDIOGRAM TRACING: CPT | Performed by: INTERNAL MEDICINE

## 2020-11-10 PROCEDURE — 93010 ELECTROCARDIOGRAM REPORT: CPT | Performed by: INTERNAL MEDICINE

## 2020-11-10 PROCEDURE — 99231 SBSQ HOSP IP/OBS SF/LOW 25: CPT | Performed by: NURSE PRACTITIONER

## 2020-11-10 PROCEDURE — 1200000000 HC SEMI PRIVATE

## 2020-11-10 PROCEDURE — 6370000000 HC RX 637 (ALT 250 FOR IP): Performed by: NURSE PRACTITIONER

## 2020-11-10 RX ORDER — METOPROLOL SUCCINATE 25 MG/1
25 TABLET, EXTENDED RELEASE ORAL DAILY
Status: DISCONTINUED | OUTPATIENT
Start: 2020-11-11 | End: 2020-11-12 | Stop reason: HOSPADM

## 2020-11-10 RX ORDER — SENNA PLUS 8.6 MG/1
1 TABLET ORAL NIGHTLY PRN
Status: DISCONTINUED | OUTPATIENT
Start: 2020-11-10 | End: 2020-11-12 | Stop reason: HOSPADM

## 2020-11-10 RX ADMIN — APIXABAN 5 MG: 5 TABLET, FILM COATED ORAL at 19:53

## 2020-11-10 RX ADMIN — LISINOPRIL 2.5 MG: 2.5 TABLET ORAL at 09:58

## 2020-11-10 RX ADMIN — Medication 8.6 MG: at 19:53

## 2020-11-10 RX ADMIN — APIXABAN 5 MG: 5 TABLET, FILM COATED ORAL at 09:59

## 2020-11-10 RX ADMIN — DOFETILIDE 250 MCG: 0.25 CAPSULE ORAL at 19:53

## 2020-11-10 RX ADMIN — METOPROLOL SUCCINATE 50 MG: 50 TABLET, EXTENDED RELEASE ORAL at 09:59

## 2020-11-10 RX ADMIN — DOFETILIDE 250 MCG: 0.25 CAPSULE ORAL at 09:59

## 2020-11-10 ASSESSMENT — PAIN SCALES - GENERAL
PAINLEVEL_OUTOF10: 0
PAINLEVEL_OUTOF10: 0

## 2020-11-10 NOTE — CARE COORDINATION
CM in to see Pt to discuss discharge planning. Pt from home with his mother and plans to return. Pt will provide transportation home. Pt has no DME and is independent with ADL's. Pt has insurance and can afford medications. Pt does not have PCP, list provided in discharge instructions. Pt denies any needs at this time. CM available if needs arise.

## 2020-11-10 NOTE — PLAN OF CARE
Problem: Falls - Risk of:  Goal: Will remain free from falls  Description: Will remain free from falls  11/10/2020 1351 by Jermain Bess  Outcome: Ongoing  11/10/2020 0411 by Paul Wu RN  Outcome: Ongoing  Goal: Absence of physical injury  Description: Absence of physical injury  11/10/2020 1351 by Jermain Bess  Outcome: Ongoing  11/10/2020 0411 by Paul Wu RN  Outcome: Ongoing

## 2020-11-10 NOTE — PROGRESS NOTES
Admit Date:  11/9/2020    Admission diagnosis / Complaint : Atrial fibrillation      Subjective:  Mr. Manfred Elmore patient states that he is feeling well. He denies any palpitations, lightheadedness or dizziness. Objective:   /73   Pulse 53   Temp 98.3 °F (36.8 °C) (Oral)   Resp 14   Ht 6' 4\" (1.93 m)   Wt 186 lb 6.4 oz (84.6 kg)   SpO2 98%   BMI 22.69 kg/m²       Intake/Output Summary (Last 24 hours) at 11/10/2020 1226  Last data filed at 11/9/2020 1339  Gross per 24 hour   Intake 300 ml   Output --   Net 300 ml       TELEMETRY: Sinus - Sinus isabelle    has a past medical history of Atrial fibrillation ([address] CHF (congestive heart failure) ([address] Enlarged prostate, H/O echocardiogram, H/O percutaneous left heart catheterization, [place] (hard of hearing), Hypotension, Indwelling catheter present on admission, Iron deficiency anemia, PONV (postoperative nausea and vomiting), UTI (urinary tract infection), and Wears glasses. has a past surgical history that includes Arm Surgery (Left, 1981); Cardioversion (05/2020); Cardiac catheterization; Colonoscopy (2010); eye surgery (2015); and TURP (N/A, 8/6/2020).      Physical Exam:  General:  Awake, alert, NAD  Skin:  Warm and dry  Neck:  JVD not appreciated  Chest:  Clear to auscultation, respiration easy  Cardiovascular:  RRR U6F1, systolic murmur appreciated   Abdomen:  Soft non tendet  Extremities:  no edema    Medications:    apixaban  5 mg Oral BID    lisinopril  2.5 mg Oral Daily    metoprolol succinate  50 mg Oral Daily    dofetilide  250 mcg Oral 2 times per day    influenza virus vaccine  0.5 mL Intramuscular Prior to discharge           Lab Data:  CBC:   Recent Labs     11/09/20  1156   WBC 7.5   HGB 13.2*   HCT 40.9*   MCV 90.7        BMP:   Recent Labs     11/09/20  1156      K 4.3      CO2 25   PHOS 3.4   BUN 15   CREATININE 1.0     LIVER PROFILE: No results for input(s): AST, ALT, LIPASE, BILIDIR, BILITOT, ALKPHOS in the last 72 hours. Invalid input(s): AMYLASE,  ALB  PT/INR:   Recent Labs     11/09/20  1112   PROTIME 15.0*   INR 1.24     APTT:   Recent Labs     11/09/20  1112   APTT 33.7     BNP:  No results for input(s): BNP in the last 72 hours.   TROPONIN: @TROPONINI:3@      Assessment/ plan  Persistent atrial fibrillation  Status post cardioversion  Tikosyn therapy monitoring  Nonischemic cardiomyopathy    Patient in sinus rhythm today  EKG reviewed ; corrected  msec: reviewed with Dr Fair Chamber   continue with Tikosyn 250 mg bid  Recommended continue with anticoagulation with Eliquis  Heart rate in the low 60s to 50s at times  May need to decrease BB- will follow   Continue lisinopril 2.5 mg every day        ROSALINO Hernandez - CNP, 11/10/2020 12:26 PM

## 2020-11-11 LAB
EKG ATRIAL RATE: 55 BPM
EKG ATRIAL RATE: 56 BPM
EKG ATRIAL RATE: 60 BPM
EKG ATRIAL RATE: 62 BPM
EKG DIAGNOSIS: NORMAL
EKG P AXIS: 81 DEGREES
EKG P AXIS: 84 DEGREES
EKG P AXIS: 89 DEGREES
EKG P AXIS: 94 DEGREES
EKG P-R INTERVAL: 240 MS
EKG P-R INTERVAL: 244 MS
EKG P-R INTERVAL: 250 MS
EKG P-R INTERVAL: 252 MS
EKG Q-T INTERVAL: 424 MS
EKG Q-T INTERVAL: 442 MS
EKG Q-T INTERVAL: 464 MS
EKG Q-T INTERVAL: 476 MS
EKG QRS DURATION: 100 MS
EKG QRS DURATION: 100 MS
EKG QRS DURATION: 94 MS
EKG QRS DURATION: 98 MS
EKG QTC CALCULATION (BAZETT): 430 MS
EKG QTC CALCULATION (BAZETT): 442 MS
EKG QTC CALCULATION (BAZETT): 443 MS
EKG QTC CALCULATION (BAZETT): 459 MS
EKG R AXIS: 27 DEGREES
EKG R AXIS: 28 DEGREES
EKG R AXIS: 30 DEGREES
EKG R AXIS: 51 DEGREES
EKG T AXIS: 39 DEGREES
EKG T AXIS: 49 DEGREES
EKG T AXIS: 53 DEGREES
EKG T AXIS: 58 DEGREES
EKG VENTRICULAR RATE: 55 BPM
EKG VENTRICULAR RATE: 56 BPM
EKG VENTRICULAR RATE: 60 BPM
EKG VENTRICULAR RATE: 62 BPM

## 2020-11-11 PROCEDURE — 93010 ELECTROCARDIOGRAM REPORT: CPT | Performed by: INTERNAL MEDICINE

## 2020-11-11 PROCEDURE — 93005 ELECTROCARDIOGRAM TRACING: CPT | Performed by: INTERNAL MEDICINE

## 2020-11-11 PROCEDURE — 6370000000 HC RX 637 (ALT 250 FOR IP): Performed by: NURSE PRACTITIONER

## 2020-11-11 PROCEDURE — 1200000000 HC SEMI PRIVATE

## 2020-11-11 PROCEDURE — 99231 SBSQ HOSP IP/OBS SF/LOW 25: CPT | Performed by: NURSE PRACTITIONER

## 2020-11-11 PROCEDURE — 6370000000 HC RX 637 (ALT 250 FOR IP): Performed by: INTERNAL MEDICINE

## 2020-11-11 RX ADMIN — APIXABAN 5 MG: 5 TABLET, FILM COATED ORAL at 09:10

## 2020-11-11 RX ADMIN — DOFETILIDE 250 MCG: 0.25 CAPSULE ORAL at 21:31

## 2020-11-11 RX ADMIN — LISINOPRIL 2.5 MG: 2.5 TABLET ORAL at 09:10

## 2020-11-11 RX ADMIN — METOPROLOL SUCCINATE 25 MG: 25 TABLET, EXTENDED RELEASE ORAL at 09:10

## 2020-11-11 RX ADMIN — DOFETILIDE 250 MCG: 0.25 CAPSULE ORAL at 09:10

## 2020-11-11 RX ADMIN — APIXABAN 5 MG: 5 TABLET, FILM COATED ORAL at 21:23

## 2020-11-11 ASSESSMENT — PAIN SCALES - GENERAL
PAINLEVEL_OUTOF10: 0

## 2020-11-11 NOTE — PLAN OF CARE
Problem: Falls - Risk of:  Goal: Will remain free from falls  Description: Will remain free from falls  11/11/2020 1059 by Mariela Deras RN  Outcome: Met This Shift  11/11/2020 0228 by Sandra Chau RN  Outcome: Ongoing  Goal: Absence of physical injury  Description: Absence of physical injury  11/11/2020 1059 by Mariela Deras RN  Outcome: Met This Shift  11/11/2020 0228 by Sandra Chau RN  Outcome: Ongoing

## 2020-11-11 NOTE — PLAN OF CARE
Problem: Falls - Risk of:  Goal: Will remain free from falls  Description: Will remain free from falls  11/11/2020 0228 by Isatu Bates RN  Outcome: Ongoing  11/10/2020 1351 by Cassidy Sun  Outcome: Ongoing  Goal: Absence of physical injury  Description: Absence of physical injury  11/11/2020 0228 by Isatu Bates RN  Outcome: Ongoing  11/10/2020 1351 by Cassidy Sun  Outcome: Ongoing

## 2020-11-11 NOTE — PLAN OF CARE
Problem: Falls - Risk of:  Goal: Will remain free from falls  Description: Will remain free from falls  11/11/2020 1236 by Jose David Ley  Outcome: Ongoing  11/11/2020 1059 by Janett Johnson RN  Outcome: Met This Shift  11/11/2020 0228 by Adis Agarwal RN  Outcome: Ongoing  Goal: Absence of physical injury  Description: Absence of physical injury  11/11/2020 1236 by Jose David Ley  Outcome: Ongoing  11/11/2020 1059 by Janett Johnson RN  Outcome: Met This Shift  11/11/2020 0228 by Adis Agarwal RN  Outcome: Ongoing

## 2020-11-11 NOTE — PROGRESS NOTES
Admit Date:  11/9/2020    Admission diagnosis / Complaint : Atrial fibrillation      Subjective:  Mr. Cristela Bartlett states that he is feeling well. Denies any dizziness or lightheadedness. Objective:   /71   Pulse 60   Temp 98.2 °F (36.8 °C) (Oral)   Resp 16   Ht 6' 4\" (1.93 m)   Wt 186 lb 6.4 oz (84.6 kg)   SpO2 98%   BMI 22.69 kg/m²       Intake/Output Summary (Last 24 hours) at 11/11/2020 1106  Last data filed at 11/11/2020 0817  Gross per 24 hour   Intake 740 ml   Output --   Net 740 ml       TELEMETRY: Sinus - Sinus isabelle    has a past medical history of Atrial fibrillation ([facility] CHF (congestive heart failure) ([facility] [address] Enlarged prostate, H/O echocardiogram, H/O percutaneous left heart catheterization, [place] (hard of hearing), Hypotension, Indwelling catheter present on admission, Iron deficiency anemia, PONV (postoperative nausea and vomiting), UTI (urinary tract infection), and Wears glasses. has a past surgical history that includes Arm Surgery (Left, 1981); Cardioversion (05/2020); Cardiac catheterization; Colonoscopy (2010); eye surgery (2015); and TURP (N/A, 8/6/2020). Physical Exam:  General:   No acute distress, awake and alert- oriented x 3   Skin:  Warm and dry  Neck:  JVD not appreciated  Chest:  Clear to auscultation, respiration easy  Cardiovascular:  Regular rate.  SR/ SB, R2C4, systolic murmur appreciated   Abdomen:  Soft   Extremities:  no edema    Medications:    metoprolol succinate  25 mg Oral Daily    apixaban  5 mg Oral BID    lisinopril  2.5 mg Oral Daily    dofetilide  250 mcg Oral 2 times per day    influenza virus vaccine  0.5 mL Intramuscular Prior to discharge           Lab Data:  CBC:   Recent Labs     11/09/20  1156   WBC 7.5   HGB 13.2*   HCT 40.9*   MCV 90.7        BMP:   Recent Labs     11/09/20  1156      K 4.3      CO2 25   PHOS 3.4   BUN 15   CREATININE 1.0     LIVER PROFILE: No results for input(s): AST, ALT, LIPASE, BILIDIR, BILITOT, ALKPHOS in the last 72 hours. Invalid input(s): AMYLASE,  ALB  PT/INR:   Recent Labs     11/09/20  1112   PROTIME 15.0*   INR 1.24     APTT:   Recent Labs     11/09/20  1112   APTT 33.7     BNP:  No results for input(s): BNP in the last 72 hours.   TROPONIN: @TROPONINI:3@      Assessment/ plan  Persistent atrial fibrillation  Status post cardioversion  Tikosyn therapy monitoring  Nonischemic cardiomyopathy    Patient in sinus rhythm/ SB  Had 2 second pause last PM- asymptomatic   EKG reviewed ; corrected     Continue with Tikosyn 250 mg bid   Continue with toprol xl 25 mg daily           ROSALINO Wright - CNP, 11/11/2020 11:06 AM

## 2020-11-12 VITALS
OXYGEN SATURATION: 98 % | RESPIRATION RATE: 16 BRPM | HEIGHT: 76 IN | BODY MASS INDEX: 22.16 KG/M2 | WEIGHT: 182 LBS | TEMPERATURE: 98.3 F | SYSTOLIC BLOOD PRESSURE: 128 MMHG | HEART RATE: 68 BPM | DIASTOLIC BLOOD PRESSURE: 73 MMHG

## 2020-11-12 LAB
EKG ATRIAL RATE: 52 BPM
EKG ATRIAL RATE: 52 BPM
EKG ATRIAL RATE: 53 BPM
EKG ATRIAL RATE: 64 BPM
EKG DIAGNOSIS: NORMAL
EKG P AXIS: 77 DEGREES
EKG P AXIS: 78 DEGREES
EKG P AXIS: 85 DEGREES
EKG P-R INTERVAL: 250 MS
EKG P-R INTERVAL: 256 MS
EKG P-R INTERVAL: 258 MS
EKG Q-T INTERVAL: 434 MS
EKG Q-T INTERVAL: 468 MS
EKG Q-T INTERVAL: 480 MS
EKG Q-T INTERVAL: 482 MS
EKG QRS DURATION: 102 MS
EKG QRS DURATION: 104 MS
EKG QRS DURATION: 92 MS
EKG QRS DURATION: 94 MS
EKG QTC CALCULATION (BAZETT): 439 MS
EKG QTC CALCULATION (BAZETT): 446 MS
EKG QTC CALCULATION (BAZETT): 447 MS
EKG QTC CALCULATION (BAZETT): 448 MS
EKG R AXIS: 23 DEGREES
EKG R AXIS: 26 DEGREES
EKG R AXIS: 29 DEGREES
EKG R AXIS: 32 DEGREES
EKG T AXIS: 25 DEGREES
EKG T AXIS: 35 DEGREES
EKG T AXIS: 46 DEGREES
EKG T AXIS: 54 DEGREES
EKG VENTRICULAR RATE: 52 BPM
EKG VENTRICULAR RATE: 52 BPM
EKG VENTRICULAR RATE: 53 BPM
EKG VENTRICULAR RATE: 64 BPM

## 2020-11-12 PROCEDURE — 6370000000 HC RX 637 (ALT 250 FOR IP): Performed by: NURSE PRACTITIONER

## 2020-11-12 PROCEDURE — G0008 ADMIN INFLUENZA VIRUS VAC: HCPCS | Performed by: INTERNAL MEDICINE

## 2020-11-12 PROCEDURE — 93010 ELECTROCARDIOGRAM REPORT: CPT | Performed by: INTERNAL MEDICINE

## 2020-11-12 PROCEDURE — 94761 N-INVAS EAR/PLS OXIMETRY MLT: CPT

## 2020-11-12 PROCEDURE — 6370000000 HC RX 637 (ALT 250 FOR IP): Performed by: INTERNAL MEDICINE

## 2020-11-12 PROCEDURE — 99239 HOSP IP/OBS DSCHRG MGMT >30: CPT | Performed by: NURSE PRACTITIONER

## 2020-11-12 PROCEDURE — 90686 IIV4 VACC NO PRSV 0.5 ML IM: CPT | Performed by: INTERNAL MEDICINE

## 2020-11-12 PROCEDURE — 93005 ELECTROCARDIOGRAM TRACING: CPT | Performed by: INTERNAL MEDICINE

## 2020-11-12 PROCEDURE — 6360000002 HC RX W HCPCS: Performed by: INTERNAL MEDICINE

## 2020-11-12 RX ORDER — DOFETILIDE 0.25 MG/1
250 CAPSULE ORAL EVERY 12 HOURS SCHEDULED
Qty: 60 CAPSULE | Refills: 3 | Status: SHIPPED | OUTPATIENT
Start: 2020-11-12 | End: 2021-01-14 | Stop reason: SDUPTHER

## 2020-11-12 RX ADMIN — DOFETILIDE 250 MCG: 0.25 CAPSULE ORAL at 08:56

## 2020-11-12 RX ADMIN — LISINOPRIL 2.5 MG: 2.5 TABLET ORAL at 08:56

## 2020-11-12 RX ADMIN — METOPROLOL SUCCINATE 25 MG: 25 TABLET, EXTENDED RELEASE ORAL at 08:56

## 2020-11-12 RX ADMIN — INFLUENZA A VIRUS A/VICTORIA/2454/2019 IVR-207 (H1N1) ANTIGEN (PROPIOLACTONE INACTIVATED), INFLUENZA A VIRUS A/HONG KONG/2671/2019 IVR-208 (H3N2) ANTIGEN (PROPIOLACTONE INACTIVATED), INFLUENZA B VIRUS B/VICTORIA/705/2018 BVR-11 ANTIGEN (PROPIOLACTONE INACTIVATED), INFLUENZA B VIRUS B/PHUKET/3073/2013 BVR-1B ANTIGEN (PROPIOLACTONE INACTIVATED) 0.5 ML: 15; 15; 15; 15 INJECTION, SUSPENSION INTRAMUSCULAR at 13:22

## 2020-11-12 RX ADMIN — APIXABAN 5 MG: 5 TABLET, FILM COATED ORAL at 08:57

## 2020-11-12 ASSESSMENT — PAIN SCALES - GENERAL
PAINLEVEL_OUTOF10: 0

## 2020-11-12 NOTE — DISCHARGE SUMMARY
Patient ID:  Elmira Jones  [id number] 88 y.o. 1950    Admit date: 11/9/2020    Discharge date:  11/12/2020      Admitting Physician: Dr Fair Chamber       Discharge Physician: Marsha Colón     Admission Diagnoses: Other cardiomyopathies [I42.8]  Visit for monitoring Tikosyn therapy [Z51.81, Z79.899]    Discharge Diagnoses:   Patient Active Problem List   Diagnosis    Atrial fibrillation with RVR ([address]    Chronic combined systolic and diastolic congestive heart failure ([address]    Essential hypertension    Thrombus of left atrial appendage    Nonischemic cardiomyopathy (HCC)    Persistent atrial fibrillation (HCC)    Paroxysmal atrial fibrillation ([address]    BPH with urinary obstruction    S/P TURP    Visit for monitoring Tikosyn therapy        Discharged Condition: good    Hospital Course: Elmira Jones was admitted with atrial fibrillation. He was initiated on Tikosyn therapy. EKGs were obtained 3 hours post dose. The QTc was then called to the physician. The Tikosyn was dosed according to the QTc. After 72 hours the patient is stable for discharge. His QTC is remain within normal limits. Patient remained hemodynamically stable. He maintained sinus rhythm with occasional sinus bradycardia. His Toprol was decreased. He is stable  for discharge. Consults: none    Procedures:      Labs:   Lab Results   Component Value Date    CREATININE 1.0 11/09/2020    BUN 15 11/09/2020     11/09/2020    K 4.3 11/09/2020     11/09/2020    CO2 25 11/09/2020      Lab Results   Component Value Date    WBC 7.5 11/09/2020    HGB 13.2 (L) 11/09/2020    HCT 40.9 (L) 11/09/2020    MCV 90.7 11/09/2020     11/09/2020      Lab Results   Component Value Date    INR 1.24 11/09/2020    PROTIME 15.0 (H) 11/09/2020    No results found for: Leonard@[employer]:  normal sinus rhythm.       Disposition: home    Patient Instructions:    Burt Rahman   Home Medication Instructions [id number]    Printed on:11/12/20 1228   Medication Information                      apixaban (ELIQUIS) 5 MG TABS tablet  Take 1 tablet by mouth 2 times daily             dofetilide (TIKOSYN) 250 MCG capsule  Take 1 capsule by mouth every 12 hours             lisinopril (PRINIVIL;ZESTRIL) 2.5 MG tablet  Take 1 tablet by mouth daily             metoprolol succinate (TOPROL XL) 25 MG extended release tablet  Take 1 tablet by mouth daily Takes with 100mg - 125mg daily                 Follow-up with Dr Kay Mendoza  in 1 week.     SignedAlfonzo Meckel, 11/12/2020, 12:28 PM

## 2020-11-13 ENCOUNTER — TELEPHONE (OUTPATIENT)
Dept: CARDIOLOGY CLINIC | Age: 70
End: 2020-11-13

## 2020-11-13 RX ORDER — METOPROLOL SUCCINATE 25 MG/1
25 TABLET, EXTENDED RELEASE ORAL DAILY
Qty: 30 TABLET | Refills: 3 | Status: SHIPPED | OUTPATIENT
Start: 2020-11-13 | End: 2020-11-20 | Stop reason: ALTCHOICE

## 2020-11-13 NOTE — TELEPHONE ENCOUNTER
Left message to call. Per Honey Hensley NP, need to confirm that patient is taking 25mg Metoprolol not 125mg.  Spoke with EC wife, number is correct

## 2020-11-16 ENCOUNTER — TELEPHONE (OUTPATIENT)
Dept: CARDIOLOGY CLINIC | Age: 70
End: 2020-11-16

## 2020-11-16 NOTE — TELEPHONE ENCOUNTER
Patient called to schedule hospital follow up with Nabila Leung, please return his call at ph# [phone].

## 2020-11-16 NOTE — TELEPHONE ENCOUNTER
Tried to reach patient to schedule 1 week follow up cardioversion appt with NP. Preferred number goes straight to voice mail. Voice mail has not been set up so no message can be left. LM on other number listed in chart requesting patient to call back to schedule.

## 2020-11-17 NOTE — TELEPHONE ENCOUNTER
Spoke with patient. Scheduled for 1 week post cardioversion with tikosyn loading for fridau 11/20 at [address] [facility].

## 2020-11-20 ENCOUNTER — OFFICE VISIT (OUTPATIENT)
Dept: CARDIOLOGY CLINIC | Age: 70
End: 2020-11-20
Payer: MEDICARE

## 2020-11-20 VITALS
DIASTOLIC BLOOD PRESSURE: 84 MMHG | SYSTOLIC BLOOD PRESSURE: 122 MMHG | WEIGHT: 193.6 LBS | TEMPERATURE: 97 F | HEIGHT: 76 IN | BODY MASS INDEX: 23.58 KG/M2 | HEART RATE: 116 BPM

## 2020-11-20 PROCEDURE — G8427 DOCREV CUR MEDS BY ELIG CLIN: HCPCS | Performed by: NURSE PRACTITIONER

## 2020-11-20 PROCEDURE — 99213 OFFICE O/P EST LOW 20 MIN: CPT | Performed by: NURSE PRACTITIONER

## 2020-11-20 PROCEDURE — 93000 ELECTROCARDIOGRAM COMPLETE: CPT | Performed by: NURSE PRACTITIONER

## 2020-11-20 PROCEDURE — 4040F PNEUMOC VAC/ADMIN/RCVD: CPT | Performed by: NURSE PRACTITIONER

## 2020-11-20 PROCEDURE — G8420 CALC BMI NORM PARAMETERS: HCPCS | Performed by: NURSE PRACTITIONER

## 2020-11-20 PROCEDURE — 3017F COLORECTAL CA SCREEN DOC REV: CPT | Performed by: NURSE PRACTITIONER

## 2020-11-20 PROCEDURE — 1036F TOBACCO NON-USER: CPT | Performed by: NURSE PRACTITIONER

## 2020-11-20 PROCEDURE — 1111F DSCHRG MED/CURRENT MED MERGE: CPT | Performed by: NURSE PRACTITIONER

## 2020-11-20 PROCEDURE — G8482 FLU IMMUNIZE ORDER/ADMIN: HCPCS | Performed by: NURSE PRACTITIONER

## 2020-11-20 PROCEDURE — 1123F ACP DISCUSS/DSCN MKR DOCD: CPT | Performed by: NURSE PRACTITIONER

## 2020-11-20 RX ORDER — METOPROLOL SUCCINATE 25 MG/1
25 TABLET, EXTENDED RELEASE ORAL 2 TIMES DAILY
Qty: 60 TABLET | Refills: 3 | Status: SHIPPED | OUTPATIENT
Start: 2020-11-20 | End: 2020-12-01 | Stop reason: SDUPTHER

## 2020-11-20 ASSESSMENT — ENCOUNTER SYMPTOMS
HOARSE VOICE: 0
ABDOMINAL PAIN: 0
COLOR CHANGE: 0
EYE PAIN: 0
SHORTNESS OF BREATH: 0
POOR WOUND HEALING: 0

## 2020-11-20 NOTE — PROGRESS NOTES
[id number] Score for Atrial Fibrillation Stroke Risk   Risk   Factors  Component Value   C CHF Yes 1   H HTN Yes 1   A2 Age >= 76 No,  (74 y.o.) 0   D DM No 0   S2 Prior Stroke/TIA No 0   V Vascular Disease No 0   A Age 74-69 Yes,  (74 y.o.) 1   Sc Sex male 0    [id number]  Score  3   Score last updated 11/20/20 8:52 AM EST    Click here for a link to the UpToDate guideline \"Atrial Fibrillation: Anticoagulation therapy to prevent embolization    Disclaimer: Risk Score calculation is dependent on accuracy of patient problem list and past encounter diagnosis.

## 2020-11-20 NOTE — PROGRESS NOTES
Electrophysiology Follow up    Reason for consultation: fluttering      Chief complaint :  Follow up hospital for cardioversion/Tikoysn    Referring physician: Dr. Zonia Mosquera     Primary care physician: No primary care provider on file. History of Present Illness: 11/20/2020  Paolo Simpson states that he is feeling well. He has noticed palpitations approximate 3 days post discharge. He did have an episode of shortness of breath which was transient during the palpitations. Lasting only a few seconds. Has also noted occasional dizziness. No syncope or near syncope          Previous visit on 10/14/2020  Patient is a 69 yr old male with hx of atrial fibrillation referred here for management of atrial fibrillation. Pt states he feels fluttering and skipping when laying down. Tiredness and weakness. Pt denies chest pain, shortness of breath, dizziness, and edema. Pt drinks two cups of coffee daily.     Past Medical History:   Diagnosis Date    Atrial fibrillation [facility])     follows with Dr Nancy Prather CHF (congestive heart failure) ([facility] [address] 03/2020    Enlarged prostate     H/O echocardiogram 05/21/2020    Mild prolapse anterior mitral valve leaflet,EF20%,no thrombus    H/O percutaneous left heart catheterization     [place] (hard of hearing)     no hearing aides    Hypotension     Indwelling catheter present on admission     \"had catheter in since March 2020\"    Iron deficiency anemia     PONV (postoperative nausea and vomiting)     \"sick in 1981 but did fine after that with other surgeries\"    UTI (urinary tract infection)     week 7/20/2020- was on antibiotic- had repeat urine test 7/27/2020 but never heard the results\"    Wears glasses      Past Surgical History:   Procedure Laterality Date    ARM SURGERY Left 1981    \"have plate and screws still in place\"    CARDIAC CATHETERIZATION      per old chart had cath 3/2020    CARDIOVERSION  05/2020    \"had cardioversion- worked for some time but the atrial fib is back\"  COLONOSCOPY  2010    EYE SURGERY  2015    left eye cataract ext     TURP N/A 8/6/2020    CYSTOSCOPY W/BIPOLAR TURP REMOVAL OF BLADDER STONES performed by Vicente Poole MD at [address] History   Problem Relation Age of Onset   Blase Liming Cancer Mother         thyroid cancer    Cancer Father         lymphoma     Social History     Tobacco Use    Smoking status: Never Smoker    Smokeless tobacco: Never Used   Substance Use Topics    Alcohol use: Not Currently     Comment: \"drink 1-2 beers per day\"        Review of Systems   Constitution: Negative for diaphoresis and malaise/fatigue. HENT: Negative for congestion and hoarse voice. Eyes: Negative for pain and visual disturbance. Cardiovascular: Positive for palpitations. Negative for chest pain, dyspnea on exertion, irregular heartbeat, near-syncope and paroxysmal nocturnal dyspnea. Respiratory: Negative for shortness of breath. Endocrine: Negative for cold intolerance and heat intolerance. Hematologic/Lymphatic: Negative for adenopathy and bleeding problem. Skin: Negative for color change and poor wound healing. Musculoskeletal: Positive for arthritis. Negative for muscle weakness. Gastrointestinal: Negative for abdominal pain and melena. Genitourinary: Negative for flank pain and hematuria. Neurological: Positive for dizziness. Negative for light-headedness. Psychiatric/Behavioral: Negative for depression. The patient is not nervous/anxious.             /84   Ht 6' 4\" (1.93 m)   Wt 193 lb 9.6 oz (87.8 kg)   BMI 23.57 kg/m²   Wt Readings from Last 3 Encounters:   11/20/20 193 lb 9.6 oz (87.8 kg)   11/12/20 182 lb (82.6 kg)   11/04/20 189 lb 3.2 oz (85.8 kg)       Current Outpatient Medications   Medication Sig Dispense Refill    acetaminophen (TYLENOL) 80 MG suppository Place 80 mg rectally every 4 hours as needed for Fever      dofetilide (TIKOSYN) 250 MCG capsule Take 1 capsule by mouth every 12 hours 60 capsule 3    apixaban (ELIQUIS) 5 MG TABS tablet Take 1 tablet by mouth 2 times daily 28 tablet 0    metoprolol succinate (TOPROL XL) 25 MG extended release tablet Take 1 tablet by mouth daily Takes with 100mg - 125mg daily 90 tablet 3    lisinopril (PRINIVIL;ZESTRIL) 2.5 MG tablet Take 1 tablet by mouth daily 90 tablet 3    metoprolol succinate (TOPROL XL) 25 MG extended release tablet Take 1 tablet by mouth daily 30 tablet 3     No current facility-administered medications for this visit. Physical Exam  Constitutional:       Appearance: Normal appearance. HENT:      Head: Normocephalic and atraumatic. Right Ear: External ear normal.      Left Ear: External ear normal.      Nose: Nose normal.      Mouth/Throat:      Mouth: Mucous membranes are moist.      Pharynx: Oropharynx is clear. Eyes:      Extraocular Movements: Extraocular movements intact. Pupils: Pupils are equal, round, and reactive to light. Neck:      Musculoskeletal: Normal range of motion and neck supple. Cardiovascular:      Rate and Rhythm: Tachycardia present. Rhythm irregular. Pulses: Normal pulses. Pulmonary:      Effort: Pulmonary effort is normal.      Breath sounds: Normal breath sounds. Abdominal:      General: There is no distension. Palpations: Abdomen is soft. Tenderness: There is no abdominal tenderness. Musculoskeletal: Normal range of motion. Right lower leg: No edema. Left lower leg: No edema. Skin:     General: Skin is warm. Capillary Refill: Capillary refill takes less than 2 seconds. Neurological:      General: No focal deficit present. Mental Status: He is alert and oriented to person, place, and time.    Psychiatric:         Mood and Affect: Mood normal.         Behavior: Behavior normal.       DATA:  Lab Results   Component Value Date    TROPONINT 0.010 (H) 03/07/2020     BNP:    Lab Results   Component Value Date    PROBNP 10,332 (H) 03/06/2020     PT/INR:  No results found for: PTINR  No results found for: LABA1C  Lab Results   Component Value Date    CHOL 150 03/10/2020    TRIG 83 03/10/2020    HDL 44 03/10/2020    LDLDIRECT 99 03/10/2020     Lab Results   Component Value Date    ALT 48 (H) 03/06/2020    AST 55 (H) 03/06/2020     TSH: No results found for: TSH      Impression and recommendations:   persistent atrial fib   Tikosyn therapy   non ischemic cardiomyopathy     S/p cardioversion   EKG today shows atrial fib rate 116  We will increase Toprol to 25 mg twice daily    Despite [facility] and start antiarrhythmic patient has reverted back to atrial fibrillation. He is symptomatic with his atrial fib: Feels palpitations has lightheadedness, occasional dizziness and shortness of breath  Discussed with patient atrial fibrillation ablation-  The risks including, but not limited to, vascular injury, bleeding, infection, radiation exposure, injury to cardiac and surrounding structures (including esophageal and pulmonary vein injury), injury to the normal conduction system of the heart (possibly requiring a pacemaker), stroke, myocardial infarction and death were all discussed. The patient considered the risks, benefits and alternatives; decided to proceed with the procedure.          ROSALINO Cowan - CNP on 11/20/2020 at 8:46 AM

## 2020-11-24 ENCOUNTER — ANESTHESIA EVENT (OUTPATIENT)
Dept: CARDIAC CATH/INVASIVE PROCEDURES | Age: 70
End: 2020-11-24
Payer: MEDICARE

## 2020-11-24 ENCOUNTER — ANESTHESIA (OUTPATIENT)
Dept: CARDIAC CATH/INVASIVE PROCEDURES | Age: 70
End: 2020-11-24
Payer: MEDICARE

## 2020-11-24 ENCOUNTER — TELEPHONE (OUTPATIENT)
Dept: CARDIOLOGY CLINIC | Age: 70
End: 2020-11-24

## 2020-11-25 NOTE — TELEPHONE ENCOUNTER
Spoke with Mane Leal regarding previous message about Tikosyn. Per Mane Leal, if patient can afford to take Tikosyn until A-Fib ablation that is scheduled on 12/14 then he should stay on it. Juan J Bob will not work for him and d/t his EF, Sotalol is no an option either. Patient states he can afford it but he just doesn't want to pay that much. Patient states he will stay on Tikosyn because he does not want to have all of the visits (PFT, 6 month OVs and blood work monitoring) that comes along with Amiodarone.

## 2020-12-01 RX ORDER — METOPROLOL SUCCINATE 25 MG/1
25 TABLET, EXTENDED RELEASE ORAL 2 TIMES DAILY
Qty: 60 TABLET | Refills: 3 | Status: SHIPPED | OUTPATIENT
Start: 2020-12-01 | End: 2021-04-19 | Stop reason: SDUPTHER

## 2020-12-01 NOTE — TELEPHONE ENCOUNTER
Patient called asking if we can send the new script   For Metoprolol to [facility] patient pharmacy   Was not aware that it was sent to [place]  It was increased to twice a day

## 2020-12-07 ENCOUNTER — TELEPHONE (OUTPATIENT)
Dept: CARDIOLOGY CLINIC | Age: 70
End: 2020-12-07

## 2020-12-07 NOTE — TELEPHONE ENCOUNTER
The patient didn't answer his phone so I left him a message for him about his CT Cardiac being this Wednesday 12/9/2020 arrival time 10:00am / 10:30 am at [facility] . NPO 4 hrs prior to testing . I also left the office number if he has any question .  This does need to be done before his Ablation with Shaina Gerber

## 2020-12-10 ENCOUNTER — NURSE ONLY (OUTPATIENT)
Dept: CARDIOLOGY CLINIC | Age: 70
End: 2020-12-10
Payer: MEDICARE

## 2020-12-10 ENCOUNTER — HOSPITAL ENCOUNTER (OUTPATIENT)
Dept: GENERAL RADIOLOGY | Age: 70
Discharge: HOME | End: 2020-12-10
Payer: MEDICARE

## 2020-12-10 ENCOUNTER — HOSPITAL ENCOUNTER (OUTPATIENT)
Age: 70
Setting detail: SPECIMEN
Discharge: HOME | End: 2020-12-10
Payer: MEDICARE

## 2020-12-10 ENCOUNTER — HOSPITAL ENCOUNTER (OUTPATIENT)
Age: 70
Discharge: HOME | End: 2020-12-10
Payer: MEDICARE

## 2020-12-10 ENCOUNTER — TELEPHONE (OUTPATIENT)
Dept: CARDIOLOGY CLINIC | Age: 70
End: 2020-12-10

## 2020-12-10 VITALS — TEMPERATURE: 96.8 F

## 2020-12-10 LAB
ANION GAP SERPL CALCULATED.3IONS-SCNC: 10 MMOL/L (ref 4–16)
APTT: 28.2 SECONDS (ref 25.1–37.1)
BASOPHILS ABSOLUTE: 0 K/CU MM
BASOPHILS RELATIVE PERCENT: 0.3 % (ref 0–1)
BUN BLDV-MCNC: 21 MG/DL (ref 6–23)
CALCIUM SERPL-MCNC: 9.2 MG/DL (ref 8.3–10.6)
CHLORIDE BLD-SCNC: 100 MMOL/L (ref 99–110)
CO2: 26 MMOL/L (ref 21–32)
CREAT SERPL-MCNC: 1 MG/DL (ref 0.9–1.3)
DIFFERENTIAL TYPE: ABNORMAL
EOSINOPHILS ABSOLUTE: 0.1 K/CU MM
EOSINOPHILS RELATIVE PERCENT: 0.7 % (ref 0–3)
GFR AFRICAN AMERICAN: >60 ML/MIN/1.73M2
GFR NON-AFRICAN AMERICAN: >60 ML/MIN/1.73M2
GLUCOSE BLD-MCNC: 151 MG/DL (ref 70–99)
HCT VFR BLD CALC: 41.6 % (ref 42–52)
HEMOGLOBIN: 13.9 GM/DL (ref 13.5–18)
IMMATURE NEUTROPHIL %: 0 % (ref 0–0.43)
INR BLD: 0.96 INDEX
LYMPHOCYTES ABSOLUTE: 3.4 K/CU MM
LYMPHOCYTES RELATIVE PERCENT: 38.1 % (ref 24–44)
MAGNESIUM: 2.1 MG/DL (ref 1.8–2.4)
MCH RBC QN AUTO: 30.5 PG (ref 27–31)
MCHC RBC AUTO-ENTMCNC: 33.4 % (ref 32–36)
MCV RBC AUTO: 91.2 FL (ref 78–100)
MONOCYTES ABSOLUTE: 0.4 K/CU MM
MONOCYTES RELATIVE PERCENT: 4.7 % (ref 0–4)
NUCLEATED RBC %: 0 %
PDW BLD-RTO: 14.2 % (ref 11.7–14.9)
PHOSPHORUS: 3.4 MG/DL (ref 2.5–4.9)
PLATELET # BLD: 200 K/CU MM (ref 140–440)
PMV BLD AUTO: 11.7 FL (ref 7.5–11.1)
POTASSIUM SERPL-SCNC: 4.3 MMOL/L (ref 3.5–5.1)
PROTHROMBIN TIME: 11.6 SECONDS (ref 11.7–14.5)
RBC # BLD: 4.56 M/CU MM (ref 4.6–6.2)
SEGMENTED NEUTROPHILS ABSOLUTE COUNT: 5 K/CU MM
SEGMENTED NEUTROPHILS RELATIVE PERCENT: 56.2 % (ref 36–66)
SODIUM BLD-SCNC: 136 MMOL/L (ref 135–145)
TOTAL IMMATURE NEUTOROPHIL: 0 K/CU MM
TOTAL NUCLEATED RBC: 0 K/CU MM
WBC # BLD: 8.9 K/CU MM (ref 4–10.5)

## 2020-12-10 PROCEDURE — 83735 ASSAY OF MAGNESIUM: CPT

## 2020-12-10 PROCEDURE — 99211 OFF/OP EST MAY X REQ PHY/QHP: CPT | Performed by: INTERNAL MEDICINE

## 2020-12-10 PROCEDURE — 36415 COLL VENOUS BLD VENIPUNCTURE: CPT

## 2020-12-10 PROCEDURE — 85025 COMPLETE CBC W/AUTO DIFF WBC: CPT

## 2020-12-10 PROCEDURE — 85730 THROMBOPLASTIN TIME PARTIAL: CPT

## 2020-12-10 PROCEDURE — U0002 COVID-19 LAB TEST NON-CDC: HCPCS

## 2020-12-10 PROCEDURE — 80048 BASIC METABOLIC PNL TOTAL CA: CPT

## 2020-12-10 PROCEDURE — 85610 PROTHROMBIN TIME: CPT

## 2020-12-10 PROCEDURE — 71046 X-RAY EXAM CHEST 2 VIEWS: CPT

## 2020-12-10 PROCEDURE — 84100 ASSAY OF PHOSPHORUS: CPT

## 2020-12-10 NOTE — TELEPHONE ENCOUNTER
Patient did not come to sign consent fomrs or have covid test.  Left message on mobile voicemail to call office.

## 2020-12-10 NOTE — PROGRESS NOTES
Patient informed of instructions and guidance for performing test.  Patient was wearing a mask and provided with gloves and tissues. Patient performed COVID nasal swab for 10 seconds in each nostril. This RN present in the room, 6 feet away. Patient dropped swab in  labeled collection tube. Collection tube and lab order placed in plastic bag. Bag placed in biohazard bag and placed in fridge.  called for . Patient here in office and educated on A-fib ablation, schedule for 12/14/2020 @ 1130, with arrival @ 0930, @ [facility]; risk explained; and consents signed. Also copy of orders given for labs and CXR due 12/10/2020 at [facility]. Instruction given to patient to :  NPO after midnight the night before procedure; call hospital at [phone] to pre-register. Hold Tikosyn the day before and the day of procedure. Hold ALL blood pressure medications morning of procedure. Hold Eliquis the evening dose night before procedure and Morning dose of the procedure. Copies of consent & info scanned in chart.

## 2020-12-10 NOTE — TELEPHONE ENCOUNTER
LM with patient to inquire why he did not have scheduled cardiac CTA done yesterday. Asked patient to call the office back.

## 2020-12-11 LAB
SARS-COV-2: NOT DETECTED
SOURCE: NORMAL

## 2020-12-11 NOTE — ANESTHESIA PRE PROCEDURE
Department of Anesthesiology  Preprocedure Note       Name:  Valeria Cortes   Age:  69 y.o.  :  1950                                          MRN:  [id number]         Date:  2020      Surgeon: * Surgery not found *    Procedure:     Medications prior to admission:   Prior to Admission medications    Medication Sig Start Date End Date Taking? Authorizing Provider   apixaban (ELIQUIS) 5 MG TABS tablet Take 1 tablet by mouth 2 times daily 12/10/20   Pramod Jerez MD   metoprolol succinate (TOPROL XL) 25 MG extended release tablet Take 1 tablet by mouth 2 times daily 20   ROSALINO Latham CNP   acetaminophen (TYLENOL) 80 MG suppository Place 80 mg rectally every 4 hours as needed for Fever    Historical Provider, MD   dofetilide (TIKOSYN) 250 MCG capsule Take 1 capsule by mouth every 12 hours 20   ROSALINO Latham CNP   lisinopril (PRINIVIL;ZESTRIL) 2.5 MG tablet Take 1 tablet by mouth daily 20   ROSALINO Latham CNP       Current medications:    Current Outpatient Medications   Medication Sig Dispense Refill    apixaban (ELIQUIS) 5 MG TABS tablet Take 1 tablet by mouth 2 times daily 28 tablet 0    metoprolol succinate (TOPROL XL) 25 MG extended release tablet Take 1 tablet by mouth 2 times daily 60 tablet 3    acetaminophen (TYLENOL) 80 MG suppository Place 80 mg rectally every 4 hours as needed for Fever      dofetilide (TIKOSYN) 250 MCG capsule Take 1 capsule by mouth every 12 hours 60 capsule 3    lisinopril (PRINIVIL;ZESTRIL) 2.5 MG tablet Take 1 tablet by mouth daily 90 tablet 3     No current facility-administered medications for this visit.         Allergies:  No Known Allergies    Problem List:    Patient Active Problem List   Diagnosis Code    Atrial fibrillation with RVR ([facility]) I48.91    Chronic combined systolic and diastolic congestive heart failure ([facility]) I50.42    Essential hypertension I10    Thrombus of left atrial appendage I51.3    Nonischemic cardiomyopathy (HCC) I42.8    Persistent atrial fibrillation (HCC) I48.19    Paroxysmal atrial fibrillation (HCC) I48.0    BPH with urinary obstruction N40.1, N13.8    S/P TURP Z90.79    Visit for monitoring Tikosyn therapy Z51.81, Z79.899       Past Medical History:        Diagnosis Date    Atrial fibrillation (HCC)     follows with Dr Corrina Alejandro CHF (congestive heart failure) ([facility] [address] 03/2020    Enlarged prostate     H/O echocardiogram 05/21/2020    Mild prolapse anterior mitral valve leaflet,EF20%,no thrombus    H/O percutaneous left heart catheterization     [place] (hard of hearing)     no hearing aides    Hypotension     Indwelling catheter present on admission     \"had catheter in since March 2020\"    Iron deficiency anemia     PONV (postoperative nausea and vomiting)     \"sick in 1981 but did fine after that with other surgeries\"    UTI (urinary tract infection)     week 7/20/2020- was on antibiotic- had repeat urine test 7/27/2020 but never heard the results\"    Wears glasses        Past Surgical History:        Procedure Laterality Date    ARM SURGERY Left 1981    \"have plate and screws still in place\"   [address]      per old chart had cath 3/2020    CARDIOVERSION  05/2020    \"had cardioversion- worked for some time but the atrial fib is back\"    COLONOSCOPY  2010    EYE SURGERY  2015    left eye cataract ext     TURP N/A 8/6/2020    CYSTOSCOPY W/BIPOLAR TURP REMOVAL OF BLADDER STONES performed by Deny Hinton MD at [address]       Social History:    Social History     Tobacco Use    Smoking status: Never Smoker    Smokeless tobacco: Never Used   Substance Use Topics    Alcohol use: Not Currently     Comment: \"drink 1-2 beers per day\"                                Counseling given: Not Answered      Vital Signs (Current): There were no vitals filed for this visit.                                            BP Readings from Last 3 Encounters:   11/20/20 122/84 11/12/20 128/73   11/09/20 (!) 148/90       NPO Status:                                                                                 BMI:   Wt Readings from Last 3 Encounters:   11/20/20 193 lb 9.6 oz (87.8 kg)   11/12/20 182 lb (82.6 kg)   11/04/20 189 lb 3.2 oz (85.8 kg)     There is no height or weight on file to calculate BMI.    CBC:   Lab Results   Component Value Date    WBC 8.9 12/10/2020    RBC 4.56 12/10/2020    HGB 13.9 12/10/2020    HCT 41.6 12/10/2020    MCV 91.2 12/10/2020    RDW 14.2 12/10/2020     12/10/2020       CMP:   Lab Results   Component Value Date     12/10/2020    K 4.3 12/10/2020     12/10/2020    CO2 26 12/10/2020    BUN 21 12/10/2020    CREATININE 1.0 12/10/2020    GFRAA >60 12/10/2020    LABGLOM >60 12/10/2020    GLUCOSE 151 12/10/2020    PROT 6.5 03/06/2020    CALCIUM 9.2 12/10/2020    BILITOT 1.2 03/06/2020    ALKPHOS 56 03/06/2020    AST 55 03/06/2020    ALT 48 03/06/2020       POC Tests: No results for input(s): POCGLU, POCNA, POCK, POCCL, POCBUN, POCHEMO, POCHCT in the last 72 hours. Coags:   Lab Results   Component Value Date    PROTIME 11.6 12/10/2020    INR 0.96 12/10/2020    APTT 28.2 12/10/2020       HCG (If Applicable): No results found for: PREGTESTUR, PREGSERUM, HCG, HCGQUANT     ABGs: No results found for: PHART, PO2ART, AUE2SZN, XPB8ASS, BEART, B7VXLVKB     Type & Screen (If Applicable):  No results found for: LABABO, LABRH    Drug/Infectious Status (If Applicable):  No results found for: HIV, HEPCAB    COVID-19 Screening (If Applicable):   Lab Results   Component Value Date    COVID19 NOT DETECTED 11/04/2020         Anesthesia Evaluation  Patient summary reviewed   history of anesthetic complications: PONV.   Airway: Mallampati: III        Dental:          Pulmonary: breath sounds clear to auscultation                             Cardiovascular:  Exercise tolerance: poor (<4 METS),   (+) hypertension:, valvular problems/murmurs: MVP, dysrhythmias: atrial fibrillation, CHF:,         Rhythm: irregular             Beta Blocker:  Order written and Not on Beta Blocker      ROS comment: Echo 5/2020:   Summary   Left ventricular systolic function is abnormal.   Ejection fraction is visually estimated at 20%. Mild prolapse of the anterior mitral valve leaflet. There was no thrombus noted in the left atrial appendage. Successful cardioversion using 200 joules. 3/2020: Diagnostic procedure:Right Coronary Angiography, Left Coronary   Angiography      Conclusions      Procedure Summary   NO CAD   [address] CARDIOMYOPATHY      Recommendations   MED THERAPY   RESTART ANTICOAG LATER     Neuro/Psych:   Negative Neuro/Psych ROS              GI/Hepatic/Renal:             Endo/Other:    (+) blood dyscrasia: anemia and anticoagulation therapy:., .          Pt had no PAT visit       Abdominal:           Vascular: negative vascular ROS. Anesthesia Plan      general     ASA 4     (  covid pending )  Induction: intravenous. MIPS: Postoperative opioids intended and Prophylactic antiemetics administered. Anesthetic plan and risks discussed with patient. Use of blood products discussed with patient whom consented to blood products. Plan discussed with CRNA. Attending anesthesiologist reviewed and agrees with Pre Eval content          ROSALINO Bright CRNA   12/11/2020       Pre Anesthesia Assessment complete. Chart reviewed on 12/11/2020    Pre Anesthesia Evaluation complete. Anesthesia plan, risks, benefits, alternatives, and personnel discussed with patient and/or legal guardian. Patient and/or legal guardian verbalized an understanding and agreed to proceed. Anesthesia plan discussed with care team members and agreed upon.   ROSALINO Bright CRNA  12/11/2020

## 2020-12-14 ENCOUNTER — HOSPITAL ENCOUNTER (OUTPATIENT)
Dept: CARDIAC CATH/INVASIVE PROCEDURES | Age: 70
Discharge: HOME | End: 2020-12-15
Attending: INTERNAL MEDICINE | Admitting: INTERNAL MEDICINE
Payer: MEDICARE

## 2020-12-14 ENCOUNTER — APPOINTMENT (OUTPATIENT)
Dept: GENERAL RADIOLOGY | Age: 70
End: 2020-12-14
Attending: INTERNAL MEDICINE
Payer: MEDICARE

## 2020-12-14 VITALS
TEMPERATURE: 97.6 F | RESPIRATION RATE: 14 BRPM | OXYGEN SATURATION: 100 % | SYSTOLIC BLOOD PRESSURE: 121 MMHG | DIASTOLIC BLOOD PRESSURE: 79 MMHG

## 2020-12-14 PROBLEM — Z98.890 S/P ABLATION OF ATRIAL FIBRILLATION: Status: ACTIVE | Noted: 2020-12-14

## 2020-12-14 PROBLEM — Z86.79 S/P ABLATION OF ATRIAL FIBRILLATION: Status: ACTIVE | Noted: 2020-12-14

## 2020-12-14 LAB
ACTIVATED CLOTTING TIME, LOW RANGE: 138 SEC
ACTIVATED CLOTTING TIME, LOW RANGE: 186 SEC
ACTIVATED CLOTTING TIME, LOW RANGE: 289 SEC
ACTIVATED CLOTTING TIME, LOW RANGE: 301 SEC
ACTIVATED CLOTTING TIME, LOW RANGE: 325 SEC
ACTIVATED CLOTTING TIME, LOW RANGE: 326 SEC
ACTIVATED CLOTTING TIME, LOW RANGE: 375 SEC
ACTIVATED CLOTTING TIME, LOW RANGE: >400 SEC
BASE EXCESS MIXED: 0.5 (ref 0–1.2)
BASE EXCESS: ABNORMAL (ref 0–3.3)
CO2: 25 MMOL/L (ref 21–32)
GLUCOSE BLD-MCNC: 96 MG/DL (ref 70–99)
HCO3 ARTERIAL: 23.6 MMOL/L (ref 18–23)
HCT VFR BLD CALC: 37 % (ref 42–52)
HEMOGLOBIN: 12.6 GM/DL (ref 13.5–18)
O2 SATURATION: 99.9 % (ref 96–97)
PCO2 ARTERIAL: 36.1 MMHG (ref 32–45)
PH BLOOD: 7.42 (ref 7.34–7.45)
PO2 ARTERIAL: 313.6 MMHG (ref 75–100)
POC CALCIUM: 1.14 MMOL/L (ref 1.12–1.32)
POC CHLORIDE: 105 MMOL/L (ref 98–109)
POC CREATININE: 0.6 MG/DL (ref 0.9–1.3)
POTASSIUM SERPL-SCNC: 3.8 MMOL/L (ref 3.5–4.5)
SODIUM BLD-SCNC: 141 MMOL/L (ref 138–146)
SOURCE, BLOOD GAS: ABNORMAL

## 2020-12-14 PROCEDURE — 6360000002 HC RX W HCPCS: Performed by: NURSE ANESTHETIST, CERTIFIED REGISTERED

## 2020-12-14 PROCEDURE — 7100000001 HC PACU RECOVERY - ADDTL 15 MIN

## 2020-12-14 PROCEDURE — 6370000000 HC RX 637 (ALT 250 FOR IP): Performed by: NURSE PRACTITIONER

## 2020-12-14 PROCEDURE — 93662 INTRACARDIAC ECG (ICE): CPT

## 2020-12-14 PROCEDURE — 2580000003 HC RX 258: Performed by: NURSE PRACTITIONER

## 2020-12-14 PROCEDURE — 3700000000 HC ANESTHESIA ATTENDED CARE

## 2020-12-14 PROCEDURE — 93662 INTRACARDIAC ECG (ICE): CPT | Performed by: INTERNAL MEDICINE

## 2020-12-14 PROCEDURE — 93656 COMPRE EP EVAL ABLTJ ATR FIB: CPT | Performed by: INTERNAL MEDICINE

## 2020-12-14 PROCEDURE — 2500000003 HC RX 250 WO HCPCS: Performed by: NURSE ANESTHETIST, CERTIFIED REGISTERED

## 2020-12-14 PROCEDURE — 2500000003 HC RX 250 WO HCPCS

## 2020-12-14 PROCEDURE — 93005 ELECTROCARDIOGRAM TRACING: CPT | Performed by: INTERNAL MEDICINE

## 2020-12-14 PROCEDURE — 93325 DOPPLER ECHO COLOR FLOW MAPG: CPT | Performed by: INTERNAL MEDICINE

## 2020-12-14 PROCEDURE — 86901 BLOOD TYPING SEROLOGIC RH(D): CPT

## 2020-12-14 PROCEDURE — C1894 INTRO/SHEATH, NON-LASER: HCPCS

## 2020-12-14 PROCEDURE — 6360000002 HC RX W HCPCS

## 2020-12-14 PROCEDURE — 2709999900 HC NON-CHARGEABLE SUPPLY

## 2020-12-14 PROCEDURE — C1732 CATH, EP, DIAG/ABL, 3D/VECT: HCPCS

## 2020-12-14 PROCEDURE — 93312 ECHO TRANSESOPHAGEAL: CPT

## 2020-12-14 PROCEDURE — 93657 TX L/R ATRIAL FIB ADDL: CPT | Performed by: INTERNAL MEDICINE

## 2020-12-14 PROCEDURE — 93613 INTRACARDIAC EPHYS 3D MAPG: CPT

## 2020-12-14 PROCEDURE — 93312 ECHO TRANSESOPHAGEAL: CPT | Performed by: INTERNAL MEDICINE

## 2020-12-14 PROCEDURE — 85347 COAGULATION TIME ACTIVATED: CPT

## 2020-12-14 PROCEDURE — 93308 TTE F-UP OR LMTD: CPT

## 2020-12-14 PROCEDURE — 93657 TX L/R ATRIAL FIB ADDL: CPT

## 2020-12-14 PROCEDURE — 93613 INTRACARDIAC EPHYS 3D MAPG: CPT | Performed by: INTERNAL MEDICINE

## 2020-12-14 PROCEDURE — 93656 COMPRE EP EVAL ABLTJ ATR FIB: CPT

## 2020-12-14 PROCEDURE — 7100000000 HC PACU RECOVERY - FIRST 15 MIN

## 2020-12-14 PROCEDURE — 71045 X-RAY EXAM CHEST 1 VIEW: CPT

## 2020-12-14 PROCEDURE — 2580000003 HC RX 258: Performed by: NURSE ANESTHETIST, CERTIFIED REGISTERED

## 2020-12-14 PROCEDURE — C1733 CATH, EP, OTHR THAN COOL-TIP: HCPCS

## 2020-12-14 PROCEDURE — 93623 PRGRMD STIMJ&PACG IV RX NFS: CPT | Performed by: INTERNAL MEDICINE

## 2020-12-14 PROCEDURE — 3700000001 HC ADD 15 MINUTES (ANESTHESIA)

## 2020-12-14 RX ORDER — PANTOPRAZOLE SODIUM 40 MG/1
40 TABLET, DELAYED RELEASE ORAL
Status: DISCONTINUED | OUTPATIENT
Start: 2020-12-14 | End: 2020-12-15 | Stop reason: HOSPADM

## 2020-12-14 RX ORDER — SODIUM CHLORIDE 0.9 % (FLUSH) 0.9 %
10 SYRINGE (ML) INJECTION EVERY 12 HOURS SCHEDULED
Status: DISCONTINUED | OUTPATIENT
Start: 2020-12-14 | End: 2020-12-15 | Stop reason: HOSPADM

## 2020-12-14 RX ORDER — SODIUM CHLORIDE 0.9 % (FLUSH) 0.9 %
10 SYRINGE (ML) INJECTION PRN
Status: DISCONTINUED | OUTPATIENT
Start: 2020-12-14 | End: 2020-12-15 | Stop reason: HOSPADM

## 2020-12-14 RX ORDER — FENTANYL CITRATE 50 UG/ML
25 INJECTION, SOLUTION INTRAMUSCULAR; INTRAVENOUS EVERY 5 MIN PRN
Status: DISCONTINUED | OUTPATIENT
Start: 2020-12-14 | End: 2020-12-14

## 2020-12-14 RX ORDER — EPHEDRINE SULFATE 50 MG/ML
INJECTION INTRAVENOUS PRN
Status: DISCONTINUED | OUTPATIENT
Start: 2020-12-14 | End: 2020-12-14 | Stop reason: SDUPTHER

## 2020-12-14 RX ORDER — LISINOPRIL 2.5 MG/1
2.5 TABLET ORAL DAILY
Status: DISCONTINUED | OUTPATIENT
Start: 2020-12-14 | End: 2020-12-15 | Stop reason: HOSPADM

## 2020-12-14 RX ORDER — LIDOCAINE HYDROCHLORIDE 20 MG/ML
INJECTION, SOLUTION EPIDURAL; INFILTRATION; INTRACAUDAL; PERINEURAL PRN
Status: DISCONTINUED | OUTPATIENT
Start: 2020-12-14 | End: 2020-12-14 | Stop reason: SDUPTHER

## 2020-12-14 RX ORDER — PROPOFOL 10 MG/ML
INJECTION, EMULSION INTRAVENOUS PRN
Status: DISCONTINUED | OUTPATIENT
Start: 2020-12-14 | End: 2020-12-14 | Stop reason: SDUPTHER

## 2020-12-14 RX ORDER — PROTAMINE SULFATE 10 MG/ML
INJECTION, SOLUTION INTRAVENOUS PRN
Status: DISCONTINUED | OUTPATIENT
Start: 2020-12-14 | End: 2020-12-14 | Stop reason: SDUPTHER

## 2020-12-14 RX ORDER — SODIUM CHLORIDE 9 MG/ML
INJECTION, SOLUTION INTRAVENOUS CONTINUOUS PRN
Status: DISCONTINUED | OUTPATIENT
Start: 2020-12-14 | End: 2020-12-14 | Stop reason: SDUPTHER

## 2020-12-14 RX ORDER — ACETAMINOPHEN 325 MG/1
650 TABLET ORAL EVERY 4 HOURS PRN
Status: DISCONTINUED | OUTPATIENT
Start: 2020-12-14 | End: 2020-12-15 | Stop reason: HOSPADM

## 2020-12-14 RX ORDER — ONDANSETRON 2 MG/ML
INJECTION INTRAMUSCULAR; INTRAVENOUS PRN
Status: DISCONTINUED | OUTPATIENT
Start: 2020-12-14 | End: 2020-12-14 | Stop reason: SDUPTHER

## 2020-12-14 RX ORDER — METOPROLOL SUCCINATE 25 MG/1
25 TABLET, EXTENDED RELEASE ORAL 2 TIMES DAILY
Status: DISCONTINUED | OUTPATIENT
Start: 2020-12-14 | End: 2020-12-15 | Stop reason: HOSPADM

## 2020-12-14 RX ORDER — ONDANSETRON 2 MG/ML
4 INJECTION INTRAMUSCULAR; INTRAVENOUS
Status: DISCONTINUED | OUTPATIENT
Start: 2020-12-14 | End: 2020-12-14

## 2020-12-14 RX ORDER — FENTANYL CITRATE 50 UG/ML
50 INJECTION, SOLUTION INTRAMUSCULAR; INTRAVENOUS EVERY 5 MIN PRN
Status: DISCONTINUED | OUTPATIENT
Start: 2020-12-14 | End: 2020-12-14

## 2020-12-14 RX ORDER — LABETALOL HYDROCHLORIDE 5 MG/ML
5 INJECTION, SOLUTION INTRAVENOUS EVERY 10 MIN PRN
Status: DISCONTINUED | OUTPATIENT
Start: 2020-12-14 | End: 2020-12-14

## 2020-12-14 RX ORDER — DOFETILIDE 0.25 MG/1
250 CAPSULE ORAL EVERY 12 HOURS SCHEDULED
Status: DISCONTINUED | OUTPATIENT
Start: 2020-12-14 | End: 2020-12-15 | Stop reason: HOSPADM

## 2020-12-14 RX ORDER — ROCURONIUM BROMIDE 10 MG/ML
INJECTION, SOLUTION INTRAVENOUS PRN
Status: DISCONTINUED | OUTPATIENT
Start: 2020-12-14 | End: 2020-12-14 | Stop reason: SDUPTHER

## 2020-12-14 RX ORDER — FENTANYL CITRATE 50 UG/ML
INJECTION, SOLUTION INTRAMUSCULAR; INTRAVENOUS PRN
Status: DISCONTINUED | OUTPATIENT
Start: 2020-12-14 | End: 2020-12-14 | Stop reason: SDUPTHER

## 2020-12-14 RX ORDER — DEXAMETHASONE SODIUM PHOSPHATE 4 MG/ML
INJECTION, SOLUTION INTRA-ARTICULAR; INTRALESIONAL; INTRAMUSCULAR; INTRAVENOUS; SOFT TISSUE PRN
Status: DISCONTINUED | OUTPATIENT
Start: 2020-12-14 | End: 2020-12-14 | Stop reason: SDUPTHER

## 2020-12-14 RX ORDER — HYDRALAZINE HYDROCHLORIDE 20 MG/ML
5 INJECTION INTRAMUSCULAR; INTRAVENOUS EVERY 10 MIN PRN
Status: DISCONTINUED | OUTPATIENT
Start: 2020-12-14 | End: 2020-12-14

## 2020-12-14 RX ADMIN — LISINOPRIL 2.5 MG: 2.5 TABLET ORAL at 17:49

## 2020-12-14 RX ADMIN — ONDANSETRON 4 MG: 2 INJECTION INTRAMUSCULAR; INTRAVENOUS at 15:06

## 2020-12-14 RX ADMIN — SODIUM CHLORIDE: 900 INJECTION INTRAVENOUS at 11:45

## 2020-12-14 RX ADMIN — METOPROLOL SUCCINATE 25 MG: 25 TABLET, EXTENDED RELEASE ORAL at 20:57

## 2020-12-14 RX ADMIN — SUGAMMADEX 200 MG: 100 INJECTION, SOLUTION INTRAVENOUS at 15:58

## 2020-12-14 RX ADMIN — ROCURONIUM BROMIDE 20 MG: 10 INJECTION INTRAVENOUS at 12:45

## 2020-12-14 RX ADMIN — ROCURONIUM BROMIDE 50 MG: 10 INJECTION INTRAVENOUS at 11:56

## 2020-12-14 RX ADMIN — FENTANYL CITRATE 100 MCG: 50 INJECTION INTRAMUSCULAR; INTRAVENOUS at 14:06

## 2020-12-14 RX ADMIN — FENTANYL CITRATE 100 MCG: 50 INJECTION INTRAMUSCULAR; INTRAVENOUS at 12:56

## 2020-12-14 RX ADMIN — PHENYLEPHRINE HYDROCHLORIDE 50 MCG: 10 INJECTION INTRAVENOUS at 11:57

## 2020-12-14 RX ADMIN — APIXABAN 5 MG: 5 TABLET, FILM COATED ORAL at 20:58

## 2020-12-14 RX ADMIN — DEXAMETHASONE SODIUM PHOSPHATE 8 MG: 4 INJECTION, SOLUTION INTRAMUSCULAR; INTRAVENOUS at 12:15

## 2020-12-14 RX ADMIN — PHENYLEPHRINE HYDROCHLORIDE 15 MCG/MIN: 10 INJECTION INTRAVENOUS at 12:48

## 2020-12-14 RX ADMIN — ROCURONIUM BROMIDE 20 MG: 10 INJECTION INTRAVENOUS at 14:06

## 2020-12-14 RX ADMIN — FENTANYL CITRATE 100 MCG: 50 INJECTION INTRAMUSCULAR; INTRAVENOUS at 11:53

## 2020-12-14 RX ADMIN — PROPOFOL 20 MG: 10 INJECTION, EMULSION INTRAVENOUS at 11:57

## 2020-12-14 RX ADMIN — PROPOFOL 100 MG: 10 INJECTION, EMULSION INTRAVENOUS at 11:56

## 2020-12-14 RX ADMIN — PANTOPRAZOLE SODIUM 40 MG: 40 TABLET, DELAYED RELEASE ORAL at 17:49

## 2020-12-14 RX ADMIN — LIDOCAINE HYDROCHLORIDE 100 MG: 20 INJECTION, SOLUTION EPIDURAL; INFILTRATION; INTRACAUDAL; PERINEURAL at 11:56

## 2020-12-14 RX ADMIN — DOFETILIDE 250 MCG: 0.25 CAPSULE ORAL at 20:57

## 2020-12-14 RX ADMIN — SODIUM CHLORIDE, PRESERVATIVE FREE 10 ML: 5 INJECTION INTRAVENOUS at 20:58

## 2020-12-14 RX ADMIN — EPHEDRINE SULFATE 10 MG: 50 INJECTION INTRAVENOUS at 12:39

## 2020-12-14 RX ADMIN — PROPOFOL 30 MG: 10 INJECTION, EMULSION INTRAVENOUS at 14:06

## 2020-12-14 RX ADMIN — PROTAMINE SULFATE 30 MG: 10 INJECTION, SOLUTION INTRAVENOUS at 15:21

## 2020-12-14 ASSESSMENT — PULMONARY FUNCTION TESTS
PIF_VALUE: 13
PIF_VALUE: 11
PIF_VALUE: 14
PIF_VALUE: 13
PIF_VALUE: 13
PIF_VALUE: 14
PIF_VALUE: 13
PIF_VALUE: 13
PIF_VALUE: 14
PIF_VALUE: 15
PIF_VALUE: 2
PIF_VALUE: 1
PIF_VALUE: 2
PIF_VALUE: 13
PIF_VALUE: 13
PIF_VALUE: 14
PIF_VALUE: 14
PIF_VALUE: 15
PIF_VALUE: 20
PIF_VALUE: 12
PIF_VALUE: 14
PIF_VALUE: 13
PIF_VALUE: 14
PIF_VALUE: 11
PIF_VALUE: 13
PIF_VALUE: 14
PIF_VALUE: 14
PIF_VALUE: 1
PIF_VALUE: 14
PIF_VALUE: 15
PIF_VALUE: 14
PIF_VALUE: 14
PIF_VALUE: 10
PIF_VALUE: 15
PIF_VALUE: 14
PIF_VALUE: 23
PIF_VALUE: 15
PIF_VALUE: 13
PIF_VALUE: 15
PIF_VALUE: 3
PIF_VALUE: 13
PIF_VALUE: 13
PIF_VALUE: 14
PIF_VALUE: 3
PIF_VALUE: 15
PIF_VALUE: 15
PIF_VALUE: 13
PIF_VALUE: 10
PIF_VALUE: 14
PIF_VALUE: 15
PIF_VALUE: 15
PIF_VALUE: 13
PIF_VALUE: 15
PIF_VALUE: 13
PIF_VALUE: 15
PIF_VALUE: 13
PIF_VALUE: 15
PIF_VALUE: 13
PIF_VALUE: 15
PIF_VALUE: 13
PIF_VALUE: 14
PIF_VALUE: 14
PIF_VALUE: 1
PIF_VALUE: 14
PIF_VALUE: 14
PIF_VALUE: 15
PIF_VALUE: 14
PIF_VALUE: 13
PIF_VALUE: 14
PIF_VALUE: 13
PIF_VALUE: 9
PIF_VALUE: 13
PIF_VALUE: 15
PIF_VALUE: 14
PIF_VALUE: 15
PIF_VALUE: 13
PIF_VALUE: 14
PIF_VALUE: 14
PIF_VALUE: 15
PIF_VALUE: 13
PIF_VALUE: 14
PIF_VALUE: 14
PIF_VALUE: 2
PIF_VALUE: 14
PIF_VALUE: 15
PIF_VALUE: 15
PIF_VALUE: 13
PIF_VALUE: 14
PIF_VALUE: 0
PIF_VALUE: 13
PIF_VALUE: 14
PIF_VALUE: 13
PIF_VALUE: 15
PIF_VALUE: 10
PIF_VALUE: 13
PIF_VALUE: 13
PIF_VALUE: 15
PIF_VALUE: 13
PIF_VALUE: 13
PIF_VALUE: 14
PIF_VALUE: 14
PIF_VALUE: 13
PIF_VALUE: 13
PIF_VALUE: 14
PIF_VALUE: 13
PIF_VALUE: 11
PIF_VALUE: 13
PIF_VALUE: 13
PIF_VALUE: 14
PIF_VALUE: 13
PIF_VALUE: 14
PIF_VALUE: 14
PIF_VALUE: 15
PIF_VALUE: 0
PIF_VALUE: 15
PIF_VALUE: 15
PIF_VALUE: 13
PIF_VALUE: 14
PIF_VALUE: 16
PIF_VALUE: 14
PIF_VALUE: 13
PIF_VALUE: 13
PIF_VALUE: 14
PIF_VALUE: 14
PIF_VALUE: 13
PIF_VALUE: 13
PIF_VALUE: 14
PIF_VALUE: 13
PIF_VALUE: 14
PIF_VALUE: 13
PIF_VALUE: 14
PIF_VALUE: 13
PIF_VALUE: 14
PIF_VALUE: 14
PIF_VALUE: 16
PIF_VALUE: 14
PIF_VALUE: 14
PIF_VALUE: 13
PIF_VALUE: 12
PIF_VALUE: 13
PIF_VALUE: 14
PIF_VALUE: 14
PIF_VALUE: 11
PIF_VALUE: 14
PIF_VALUE: 14
PIF_VALUE: 13
PIF_VALUE: 15
PIF_VALUE: 13
PIF_VALUE: 14
PIF_VALUE: 1
PIF_VALUE: 14
PIF_VALUE: 10
PIF_VALUE: 15
PIF_VALUE: 13
PIF_VALUE: 14
PIF_VALUE: 13
PIF_VALUE: 13
PIF_VALUE: 2
PIF_VALUE: 11
PIF_VALUE: 14
PIF_VALUE: 15
PIF_VALUE: 15
PIF_VALUE: 10
PIF_VALUE: 14
PIF_VALUE: 13
PIF_VALUE: 13
PIF_VALUE: 14
PIF_VALUE: 13
PIF_VALUE: 14
PIF_VALUE: 14
PIF_VALUE: 13
PIF_VALUE: 14
PIF_VALUE: 11
PIF_VALUE: 15
PIF_VALUE: 15
PIF_VALUE: 13
PIF_VALUE: 1
PIF_VALUE: 14
PIF_VALUE: 13
PIF_VALUE: 15
PIF_VALUE: 14
PIF_VALUE: 1
PIF_VALUE: 13
PIF_VALUE: 14
PIF_VALUE: 14
PIF_VALUE: 9
PIF_VALUE: 14
PIF_VALUE: 15
PIF_VALUE: 13
PIF_VALUE: 14
PIF_VALUE: 13
PIF_VALUE: 13
PIF_VALUE: 15
PIF_VALUE: 14
PIF_VALUE: 14
PIF_VALUE: 9
PIF_VALUE: 14
PIF_VALUE: 13
PIF_VALUE: 9
PIF_VALUE: 13
PIF_VALUE: 13
PIF_VALUE: 12
PIF_VALUE: 13
PIF_VALUE: 12
PIF_VALUE: 13
PIF_VALUE: 15
PIF_VALUE: 14
PIF_VALUE: 14
PIF_VALUE: 1
PIF_VALUE: 15
PIF_VALUE: 11
PIF_VALUE: 15
PIF_VALUE: 14
PIF_VALUE: 15
PIF_VALUE: 14
PIF_VALUE: 13
PIF_VALUE: 14
PIF_VALUE: 9
PIF_VALUE: 14
PIF_VALUE: 13
PIF_VALUE: 14
PIF_VALUE: 13
PIF_VALUE: 15
PIF_VALUE: 14
PIF_VALUE: 15
PIF_VALUE: 14
PIF_VALUE: 13
PIF_VALUE: 14
PIF_VALUE: 14
PIF_VALUE: 15
PIF_VALUE: 13
PIF_VALUE: 13
PIF_VALUE: 24
PIF_VALUE: 14
PIF_VALUE: 14
PIF_VALUE: 1

## 2020-12-14 ASSESSMENT — ENCOUNTER SYMPTOMS
SHORTNESS OF BREATH: 0
HOARSE VOICE: 0
POOR WOUND HEALING: 0
COLOR CHANGE: 0
EYE PAIN: 0
ABDOMINAL PAIN: 0

## 2020-12-14 ASSESSMENT — PAIN SCALES - GENERAL: PAINLEVEL_OUTOF10: 0

## 2020-12-14 NOTE — ANESTHESIA PROCEDURE NOTES
Arterial Line:    An arterial line was placed using surface landmarks, in the OR for the following indication(s): continuous blood pressure monitoring and blood sampling needed. A 20 gauge (size), 1 and 3/4 inch (length), Arrow (type) catheter was placed, Seldinger technique used, into the right radial artery, secured by tape and Tegaderm. Anesthesia type: General    Events:  patient tolerated procedure well with no complications and EBL < 5mL.   12/14/2020 12:05 PM12/14/2020 12:10 PM  Anesthesiologist: Skylar Ronquillo MD  Resident/CRNA: ROSALINO Sahu CRNA  Performed: Resident/CRNA   Preanesthetic Checklist  Completed: patient identified, IV checked, site marked, risks and benefits discussed, surgical consent, monitors and equipment checked, pre-op evaluation, timeout performed, anesthesia consent given, oxygen available and patient being monitored

## 2020-12-14 NOTE — H&P
had cath 3/2020    CARDIOVERSION  05/2020    \"had cardioversion- worked for some time but the atrial fib is back\"    COLONOSCOPY  2010    EYE SURGERY  2015    left eye cataract ext     TURP N/A 8/6/2020    CYSTOSCOPY W/BIPOLAR TURP REMOVAL OF BLADDER STONES performed by Giselle Vega MD at [address] History   Problem Relation Age of Onset    Cancer Mother         thyroid cancer    Cancer Father         lymphoma     Social History     Tobacco Use    Smoking status: Never Smoker    Smokeless tobacco: Never Used   Substance Use Topics    Alcohol use: Not Currently     Comment: \"drink 1-2 beers per day\"        Review of Systems   Constitution: Negative for diaphoresis and malaise/fatigue. HENT: Negative for congestion and hoarse voice. Eyes: Negative for pain and visual disturbance. Cardiovascular: Positive for palpitations. Negative for chest pain, dyspnea on exertion, irregular heartbeat, near-syncope and paroxysmal nocturnal dyspnea. Respiratory: Negative for shortness of breath. Endocrine: Negative for cold intolerance and heat intolerance. Hematologic/Lymphatic: Negative for adenopathy and bleeding problem. Skin: Negative for color change and poor wound healing. Musculoskeletal: Positive for arthritis. Negative for muscle weakness. Gastrointestinal: Negative for abdominal pain and melena. Genitourinary: Negative for flank pain and hematuria. Neurological: Positive for dizziness. Negative for light-headedness. Psychiatric/Behavioral: Negative for depression. The patient is not nervous/anxious. BP (!) 146/89   Temp 96.5 °F (35.8 °C) (Temporal)   Resp 18   Ht 6' 4\" (1.93 m)   Wt 193 lb (87.5 kg)   SpO2 100%   BMI 23.49 kg/m²  hr : 98  Wt Readings from Last 3 Encounters:   12/14/20 193 lb (87.5 kg)   11/20/20 193 lb 9.6 oz (87.8 kg)   11/12/20 182 lb (82.6 kg)       No current facility-administered medications for this encounter.         Physical twice daily    Despite [facility] and start antiarrhythmic patient has reverted back to atrial fibrillation. He is symptomatic with his atrial fib: Feels palpitations has lightheadedness, occasional dizziness and shortness of breath  Discussed with patient atrial fibrillation ablation-  The risks including, but not limited to, vascular injury, bleeding, infection, radiation exposure, injury to cardiac and surrounding structures (including esophageal and pulmonary vein injury), injury to the normal conduction system of the heart (possibly requiring a pacemaker), stroke, myocardial infarction and death were all discussed. The patient considered the risks, benefits and alternatives; decided to proceed with the procedure.          Duong Caldwell MD on 12/14/2020 at 11:50 AM

## 2020-12-14 NOTE — PROCEDURES
Niraj Hodgkin   69 y.o., male  1950 12/14/2020    Attending: Ida Valero MD    Sedation : Anesthesia                        Indication:             Pre-atrial fibrillation ablation                                                                                                                                             After obtaining the informed cosent. Pt brought to EP lab. Sedation per anesthesia . After proper sedation SEEMA performed. US Doppler was used to assess abnormalities where appropriate. Post procedure pt is stable. Findings:  LV=  Appears LVEF is reduced     LA=  dilated  ALEJANDRA= NO CLOTS  RV= normal  RA=  normal  IAS= Normal  Bubble study=not donefor PFO or ASD  AV= tricuspid, no significant regurgitation or stenosis  MV=mild to moderate regurgitation, no stenosis  TV=mild regurgitation  PV= no significant regurgitation,   Aorta=mild stable plaque noted  PUL ADITI FLOW=systolic blunting noted.  In atrial fibrillation    Impression:  No ALEJANDRA clot    Plan:  Proceed with ablation

## 2020-12-14 NOTE — PROGRESS NOTES
1620-pt rec'd from the E-P lab and placed on pacu monitor with alarms on. Report rec'd from E-P nurse. Pt arousing and following commands. Pt re-oriented to surroundings. resps even and unlabored. wayne groin areas soft upon palpation; no drainage or swelling noted. BPPP. NSR noted on monitor. Pt denies any pain. 1632- echo being done per orders. 1712- pt remains in NSR on monitor. Pt denies pain. wayne groin sites are without drainage or swelling. Pt transferred to room 3116 via bed without incident. Connected to room monitor and groin check performed with on-coming nurse.

## 2020-12-14 NOTE — OP NOTE
PROCEDURES PERFORMED:    1. Comprehensive electrophysiologic evaluation including transseptal catheterization, insertion and repositioning of multiple electrode catheters with induction or attempted induction of an arrhythmia including left or right atrial pacing/recording when necessary, right ventricular pacing/recording when necessary, and His bundle recording when necessary with intracardiac catheter ablation of atrial fibrillation by pulmonary vein isolation    2. Intracardiac electrophysiologic three-dimensional mapping    3. Transseptal puncture through an intact septum with measurement of RA and LA pressures. 4. Intracardiac echocardiography. 5. Programmed stimulation and pacing after adenosine injections for assessment of PV isolation    6. Additional ablations :   A) Posterior wall isolation    7. Additional SVT ablations  A) Cavotricuspid isthmus Ablation (SVT ablation)        ATTENDING: Rosemary Kaplan MD.    REFERRING PHYSICIAN:      COMPLICATIONS: None. ESTIMATED BLOOD LOSS: 30 mL. ANESTHESIA: General with intubation    MEDICATIONS USED FOR INDUCTION/TESTING: adenosine    PREOPERATIVE DIAGNOSIS: Paroxysmal Atrial fibrillation    POSTOPERATIVE DIAGNOSIS:  Atrial fibrillation sp Pulmonary vein isolation, Posterior wall isolation with  Posterior floor line and Roof line ablation, Cavotricuspid isthmus ablation. Now in sinus rhythm    INDICATIONS FOR PROCEDURE: Patient with symptomatic persistent atrial fibrillation despite medical therapy presented today in sinus rhythm though so PAF here for atrial fibrillation ablation      DETAILS OF PROCEDURE:   The patient was brought to the electrophysiology laboratory in stable condition. The patient was in a fasting, nonsedated state. The risks, benefits and alternatives of the procedure were discussed with the patient and his family.  The risks including, but not limited to, the risks of vascular injury, bleeding, infection, radiation exposure, injury to cardiac and surrounding structures (including esophageal and phrenic nerve injury in addition to pulmonary vein stenosis), injury to the normal conduction system of the heart, stroke, myocardial infarction and death were discussed. Written informed consent was obtained and placed on the chart. A timeout protocol was completed to identify the patient and the procedure being performed. SEEMA was performed prior to the ablation procedure to assure the absence of any intracardiac thrombi or other contraindications to proceeding with ablation. The full SEEMA report is dictated separately. The patient was prepped and draped in a sterile fashion. Lidocaine was administered to the right and left groin. Using a modified Seldinger technique, venous access was obtained and sheaths were placed as detailed below. Catheters were then placed under fluoroscopic guidance as detailed below. SHEATH AND CATHETER PLACEMENT:  Location  Sheath  Catheter  site    Left femoral vein  7F  Akredo 6F Decapolar catheter  Coronary sinus    Left femoral vein  11F  Intracardiac Echo with sound technology  Right atrium    Right femoral vein  8F Short and upgraded to 8.5F SRO sheath  D-F curve Smart touch irrigated [employer] Ablation catheter        D curve pentaray catheter Pulmonay veins, Left atrium, RA, RV,     Cavotricuspid isthmus ablation    LA mapping, CFAE idenitication   Right femoral vein  8F Short  Quadripolar catheter     RA and RV pacing. Other access in case of need for double transeptal      A small caliber arterial line in the radial artery was obtained by the anesthesia service.       Patient presented in sinus rhythm today but he was usually persistent atrial fibrillation     EP study: measurments in ms    Baseline cycle length : 982  ME interval : 185  QRS interval : 90    AH : 75  HV : 50    Antegrade Wenckbach cycle length : 420ms  Retrograde Wenckebach cycle length: 410 ms    AVNERP: 500/410ms  AERP: 500/230ms    VAERP: 500/330ms  VERP: 500/260ms    Arrhythmia induction:    Patient was easily induced into atrial fibrillation with pacing and continued to sustain in atrial fibrillation. At this time we decided to perform the PV isolation and also map the left atrium for the complex fractionated atrial electrocardiogram signals      Intracardiac echocardiography:     A baseline intracardiac echocardiography study was performed. During the procedure, intracardiac echocardiography was used for transseptal puncture, monitoring of catheter placement during radiofrequency lesions, and monitoring for complications. Three-dimensional electroanatomical mapping:    A three-dimensional electroanatomical mapping: Using the [employer]ITU [employer] CARTO 3 three-dimensional electroanatomical map, a shell map of the left atrium and the pulmonary veins was created by dragging the pentaray and ablation catheter in different areas of the left atrium and in the pulmonary veins. The map was used for aiding the navigation process and to reduce fluoroscopy use. It was also used to guide ablation lesions and to ganga those lesions. Trans-septal puncture: Following initial rhythm determination, which demonstrated that the patient was in atrial fibrillation an 0.032 inch J-tipped guidewire was advanced through the 8-Wolof sheath in the right femoral vein under fluoroscopic guidance. An SRO sheath and dilator were advanced over the guidewire into the superior vena cava under fluoroscopic guidance. The guidewire was removed. A Brockenbrough 1 needle was advanced through the dilator until the tip was slightly behind the tip of the dilator. This system was withdrawn until the apparatus was in contact with the foramen ovale. Transseptal access was obtained. Under fluoroscopic, hemodynamic (LA pressure recording = 15 mm) and ultrasound guidance, the left atrium was cannulated and sheath advanced.  The dilator and needle were removed. Intracardiac echocardiography demonstrated no acute complications. Heparin was given to the patient to keep ACT at around 350sec through out procedure with ACT checks every 15 minutes. The Pentaray catheter was sequentially positioned in the ostium of each of the pulmonary veins under fluoroscopic, electroanatomic, electrophysiologic and ultrasound guidance and we mapped the left atrium. Examination of the catheter signals demonstrated conduction of electrical activity in all the pulmonary veins (two left pulmonary veins and two right pulmonary veins). Complex fractionated signals were also mapped at the same time in LA and mostly on the posterior wall. Ablation:       The Smart touch ablation catheter was advanced into position near the right-sided pulmonary veins. A wide circumferential ablation line was performed to encompass the ostia of both the right superior and right inferior pulmonary veins. The settings for ablation were 35 hutton with a contact force ranging between  3 gm/sec to 40 gm/ sec was given per lesion with assessment of signal reduction at the site of ablation, impedance drop, Visitag ablation mapping and also FTI upto 450 and Ablation index of upto 450 on posterior wall and upto 550 on anterior wall    Esophageal temperature was monitored through out the procedure with movement of the esophageal temperature probe. Max temp was 38.8 C. Point by point ablation had to be done as esophagus was very close to the left pulmonary vein. We stopped ablation after minimal increase in temperature and then came back once her temperature was back to normal range. Ablation lesions were continued until a large circumference ablation line was created, both anterior and posterior to the right-sided pulmonary veins. We performed also hong ablation to get a complete entrance block.     A wide circumference ablation line was similarly done around the left-sided superior and inferior pulmonary veins - including hong (needed to be ablated to get complete entrance block) between the pulmonary veins and also ridge between Left superior vein and appendage. We then focused out attention to the posterior wall as compleex fractionated signals were noted. Posterior floor and Posterior roof line ablation were performed encompassing most of the CFAEs on posterior wall - connecting right and left pulmonary veins and post ablation posterior wall was silent. Patient still continued to be in atrial fibrillation at this time so we performed cardiversion at  200J and patient immediately cardioverted into sinus rhythm. Exit and entrance block from the right and left-sided pulmonary veins was then checked and confirmed. Following ablation, we confirmed entrance block at each vessel again. Pacing was also performed from each pole of the  pentaray catheter at 10 milliamps, 2 milliseconds to assess for exit block. Veins with insufficient exit block were further ablated until exit block was achieved and entrance block confirmed. Post ablation we could not induce arrhythmia with the EP study and rapid atrial stimulation. Adenosine testing    IV adenosine was given per pulmonary veinous side to assess for spontaneous pulmonary vein activity and conduction into atrium if any, Entrance and exit block were confirmed post adenosine on each vein. Pulmonary veins were isolated. Pacing post adenosine IV did not induce any arrhythmia. Following confirmation of pulmonary vein isolation, ablation catheter and sheaths were removed from the left atrium. All catheters were now in the right atrium. Intracardiac echocardiography was used to document the absence of any significant effusion or any other complication. Catheters were removed under fluoroscopic guidance. Sheaths were pulled and ,annual compression to achieve hemostasis. There was no bleeding noted from any of the access sites.  The

## 2020-12-14 NOTE — ANESTHESIA POSTPROCEDURE EVALUATION
Department of Anesthesiology  Postprocedure Note    Patient: Jose Manriquez  MRN: [id number]  YOB: 1950  Date of evaluation: 12/14/2020  Time:  4:24 PM     Procedure Summary     Date: 12/14/20 Room / Location: [address] [place] [address] Cath Lab    Anesthesia Start: 2932 Anesthesia Stop: 4582    Procedure: [facility] EP AFIB ABLATION W ANESTH Diagnosis:       Unspecified atrial fibrillation      S/P ablation of atrial fibrillation    Scheduled Providers:  Responsible Provider: Maryellen Bee MD    Anesthesia Type: general ASA Status: 4          Anesthesia Type: general    Jose Antonio Phase I: Jose Antonio Score: 8    Jose Antonio Phase II:      Last vitals: Reviewed and per EMR flowsheets.        Anesthesia Post Evaluation    Patient location during evaluation: PACU  Patient participation: complete - patient participated  Level of consciousness: awake and alert  Pain score: 0  Airway patency: patent  Nausea & Vomiting: no vomiting and no nausea  Complications: no  Cardiovascular status: blood pressure returned to baseline and hemodynamically stable  Respiratory status: acceptable, room air, spontaneous ventilation and nonlabored ventilation  Hydration status: stable

## 2020-12-15 VITALS
RESPIRATION RATE: 20 BRPM | BODY MASS INDEX: 23.88 KG/M2 | DIASTOLIC BLOOD PRESSURE: 54 MMHG | SYSTOLIC BLOOD PRESSURE: 105 MMHG | TEMPERATURE: 97.9 F | OXYGEN SATURATION: 98 % | HEIGHT: 76 IN | HEART RATE: 83 BPM | WEIGHT: 196.13 LBS

## 2020-12-15 LAB
ALBUMIN SERPL-MCNC: 3.6 GM/DL (ref 3.4–5)
ALP BLD-CCNC: 41 IU/L (ref 40–128)
ALT SERPL-CCNC: 20 U/L (ref 10–40)
ANION GAP SERPL CALCULATED.3IONS-SCNC: 10 MMOL/L (ref 4–16)
AST SERPL-CCNC: 33 IU/L (ref 15–37)
BILIRUB SERPL-MCNC: 0.6 MG/DL (ref 0–1)
BUN BLDV-MCNC: 16 MG/DL (ref 6–23)
CALCIUM SERPL-MCNC: 8.1 MG/DL (ref 8.3–10.6)
CHLORIDE BLD-SCNC: 102 MMOL/L (ref 99–110)
CO2: 22 MMOL/L (ref 21–32)
CREAT SERPL-MCNC: 1 MG/DL (ref 0.9–1.3)
GFR AFRICAN AMERICAN: >60 ML/MIN/1.73M2
GFR NON-AFRICAN AMERICAN: >60 ML/MIN/1.73M2
GLUCOSE BLD-MCNC: 118 MG/DL (ref 70–99)
POTASSIUM SERPL-SCNC: 4.7 MMOL/L (ref 3.5–5.1)
SODIUM BLD-SCNC: 134 MMOL/L (ref 135–145)
TOTAL PROTEIN: 5.7 GM/DL (ref 6.4–8.2)

## 2020-12-15 PROCEDURE — 80053 COMPREHEN METABOLIC PANEL: CPT

## 2020-12-15 PROCEDURE — 99217 PR OBSERVATION CARE DISCHARGE MANAGEMENT: CPT | Performed by: NURSE PRACTITIONER

## 2020-12-15 PROCEDURE — 80048 BASIC METABOLIC PNL TOTAL CA: CPT

## 2020-12-15 PROCEDURE — 6370000000 HC RX 637 (ALT 250 FOR IP): Performed by: NURSE PRACTITIONER

## 2020-12-15 PROCEDURE — 2580000003 HC RX 258: Performed by: NURSE PRACTITIONER

## 2020-12-15 PROCEDURE — 36415 COLL VENOUS BLD VENIPUNCTURE: CPT

## 2020-12-15 RX ORDER — PANTOPRAZOLE SODIUM 40 MG/1
40 TABLET, DELAYED RELEASE ORAL DAILY
Qty: 30 TABLET | Refills: 0 | Status: SHIPPED | OUTPATIENT
Start: 2020-12-15 | End: 2021-01-07 | Stop reason: SDUPTHER

## 2020-12-15 RX ADMIN — DOFETILIDE 250 MCG: 0.25 CAPSULE ORAL at 09:00

## 2020-12-15 RX ADMIN — SODIUM CHLORIDE, PRESERVATIVE FREE 10 ML: 5 INJECTION INTRAVENOUS at 09:00

## 2020-12-15 RX ADMIN — LISINOPRIL 2.5 MG: 2.5 TABLET ORAL at 09:00

## 2020-12-15 RX ADMIN — METOPROLOL SUCCINATE 25 MG: 25 TABLET, EXTENDED RELEASE ORAL at 09:00

## 2020-12-15 RX ADMIN — APIXABAN 5 MG: 5 TABLET, FILM COATED ORAL at 09:00

## 2020-12-15 RX ADMIN — PANTOPRAZOLE SODIUM 40 MG: 40 TABLET, DELAYED RELEASE ORAL at 09:00

## 2020-12-15 NOTE — DISCHARGE SUMMARY
[facility]  Discharge Summary    [facility]  :  1950  MRN:  [id number]    Admit date:  2020  Discharge date:      Admitting Physician:  Nat Gardiner MD    Discharge Diagnoses:      1. S/P successful isolation of the pulmonary veins for ablation of atrial fibrillation  2. S/P ablation of posterior wall isolation with posterior floor line and roof line ablation. Patient Active Problem List   Diagnosis    Atrial fibrillation with RVR (HCC)    Chronic combined systolic and diastolic congestive heart failure ([address]    Essential hypertension    Thrombus of left atrial appendage    Nonischemic cardiomyopathy (HCC)    Persistent atrial fibrillation (HCC)    Paroxysmal atrial fibrillation [facility] [facility]    BPH with urinary obstruction    S/P TURP    Visit for monitoring Tikosyn therapy    S/P ablation of atrial fibrillation       Admission Condition:  fair    Discharged Condition:  good    Hospital Course:   Patient admitted post ablation of atrial fibrillation for observation. Patient tolerated the procedure and post procedure stay was uneventful. Patient was stable for discharge to be followed as an out paitent  Discharge instructions reviewed with patient. Patient voiced understanding. Current Discharge Medication List           Details   pantoprazole (PROTONIX) 40 MG tablet Take 1 tablet by mouth daily  Qty: 30 tablet, Refills: 0              Details   apixaban (ELIQUIS) 5 MG TABS tablet Take 1 tablet by mouth 2 times daily  Qty: 28 tablet, Refills: 0    Comments: 2 boxes of 14 tabs each (28).  Lot: [id number]; Exp: 2022      metoprolol succinate (TOPROL XL) 25 MG extended release tablet Take 1 tablet by mouth 2 times daily  Qty: 60 tablet, Refills: 3      acetaminophen (TYLENOL) 80 MG suppository Place 80 mg rectally every 4 hours as needed for Fever      dofetilide (TIKOSYN) 250 MCG capsule Take 1 capsule by mouth every 12 hours  Qty: 60 capsule, Refills: 3      lisinopril (PRINIVIL;ZESTRIL) 2.5 MG tablet Take 1 tablet by mouth daily  Qty: 90 tablet, Refills: 3             Discharge Exam:  BP (!) 105/54   Pulse 83   Temp 97.9 °F (36.6 °C) (Oral)   Resp 20   Ht 6' 4\" (1.93 m)   Wt 196 lb 2 oz (89 kg)   SpO2 98%   BMI 23.87 kg/m²   General appearance: alert, appears stated age and cooperative  Head: Normocephalic, without obvious abnormality, atraumatic  Lungs: clear to auscultation bilaterally  Heart: regular rate and rhythm, S1, S2 normal, no murmur, click, rub or gallop  Extremities: extremities normal, atraumatic, no cyanosis or edema  Pulses: 2+ and symmetric  Skin: Skin color, texture, turgor normal. No rashes or lesions . Bilateral femoral groin sites soft, nontender. No hematoma no redness or swelling.    Neurologic: Grossly normal    Disposition:   home    Signed:  Wong Toscano  12/15/2020, 9:11 AM

## 2020-12-15 NOTE — PROGRESS NOTES
CLINICAL PHARMACY NOTE: MEDS TO [address] Select Patient?: No  Total # of Prescriptions Filled: 1   The following medications were delivered to the patient:  · Pantoprazole 40mg  Total # of Interventions Completed: 0  Time Spent (min): 15    Additional Documentation:

## 2020-12-16 LAB
EKG ATRIAL RATE: 92 BPM
EKG DIAGNOSIS: NORMAL
EKG P AXIS: 77 DEGREES
EKG P-R INTERVAL: 180 MS
EKG Q-T INTERVAL: 414 MS
EKG QRS DURATION: 104 MS
EKG QTC CALCULATION (BAZETT): 511 MS
EKG R AXIS: 23 DEGREES
EKG T AXIS: 72 DEGREES
EKG VENTRICULAR RATE: 92 BPM

## 2020-12-16 PROCEDURE — 93010 ELECTROCARDIOGRAM REPORT: CPT | Performed by: INTERNAL MEDICINE

## 2021-01-07 NOTE — TELEPHONE ENCOUNTER
Patient called requesting prescription refills of Pantoprazole, lisinopril and Eliquis to go to [facility] for a 30 day supply. Patient's insurance has changed to [facility] as of 1/1/21; Member ID: [id number]. Did not update correctly.

## 2021-01-08 RX ORDER — PANTOPRAZOLE SODIUM 40 MG/1
40 TABLET, DELAYED RELEASE ORAL DAILY
Qty: 30 TABLET | Refills: 5 | Status: SHIPPED | OUTPATIENT
Start: 2021-01-08 | End: 2021-03-29 | Stop reason: SDUPTHER

## 2021-01-08 RX ORDER — LISINOPRIL 2.5 MG/1
2.5 TABLET ORAL DAILY
Qty: 30 TABLET | Refills: 11 | Status: SHIPPED | OUTPATIENT
Start: 2021-01-08 | End: 2021-03-29 | Stop reason: SDUPTHER

## 2021-01-11 NOTE — TELEPHONE ENCOUNTER
90 Day refill of dofetilide 250 mcg send to [facility] on [address]

## 2021-01-12 RX ORDER — DOFETILIDE 0.25 MG/1
250 CAPSULE ORAL EVERY 12 HOURS SCHEDULED
Qty: 180 CAPSULE | Refills: 3 | OUTPATIENT
Start: 2021-01-12

## 2021-01-14 ENCOUNTER — OFFICE VISIT (OUTPATIENT)
Dept: CARDIOLOGY CLINIC | Age: 71
End: 2021-01-14
Payer: MEDICARE

## 2021-01-14 VITALS
HEIGHT: 76 IN | DIASTOLIC BLOOD PRESSURE: 88 MMHG | TEMPERATURE: 97.9 F | HEART RATE: 76 BPM | SYSTOLIC BLOOD PRESSURE: 118 MMHG | WEIGHT: 199.4 LBS | BODY MASS INDEX: 24.28 KG/M2

## 2021-01-14 DIAGNOSIS — Z98.890 S/P ABLATION OF ATRIAL FIBRILLATION: Primary | ICD-10-CM

## 2021-01-14 DIAGNOSIS — Z86.79 S/P ABLATION OF ATRIAL FIBRILLATION: Primary | ICD-10-CM

## 2021-01-14 PROCEDURE — G8482 FLU IMMUNIZE ORDER/ADMIN: HCPCS | Performed by: NURSE PRACTITIONER

## 2021-01-14 PROCEDURE — 93000 ELECTROCARDIOGRAM COMPLETE: CPT | Performed by: NURSE PRACTITIONER

## 2021-01-14 PROCEDURE — 99213 OFFICE O/P EST LOW 20 MIN: CPT | Performed by: NURSE PRACTITIONER

## 2021-01-14 PROCEDURE — G8420 CALC BMI NORM PARAMETERS: HCPCS | Performed by: NURSE PRACTITIONER

## 2021-01-14 PROCEDURE — 1036F TOBACCO NON-USER: CPT | Performed by: NURSE PRACTITIONER

## 2021-01-14 PROCEDURE — 4040F PNEUMOC VAC/ADMIN/RCVD: CPT | Performed by: NURSE PRACTITIONER

## 2021-01-14 PROCEDURE — 3017F COLORECTAL CA SCREEN DOC REV: CPT | Performed by: NURSE PRACTITIONER

## 2021-01-14 PROCEDURE — G8427 DOCREV CUR MEDS BY ELIG CLIN: HCPCS | Performed by: NURSE PRACTITIONER

## 2021-01-14 PROCEDURE — 1123F ACP DISCUSS/DSCN MKR DOCD: CPT | Performed by: NURSE PRACTITIONER

## 2021-01-14 RX ORDER — DOFETILIDE 0.25 MG/1
250 CAPSULE ORAL EVERY 12 HOURS SCHEDULED
Qty: 180 CAPSULE | Refills: 0 | Status: SHIPPED | OUTPATIENT
Start: 2021-01-14 | End: 2021-05-04 | Stop reason: ALTCHOICE

## 2021-01-14 RX ORDER — IBUPROFEN 200 MG
400 TABLET ORAL
COMMUNITY

## 2021-01-14 ASSESSMENT — ENCOUNTER SYMPTOMS
HOARSE VOICE: 0
SHORTNESS OF BREATH: 0
ABDOMINAL PAIN: 0
EYE PAIN: 0
POOR WOUND HEALING: 0
COUGH: 0
COLOR CHANGE: 0

## 2021-01-14 NOTE — PROGRESS NOTES
Electrophysiology Follow up    Reason for consultation: fluttering      Chief complaint: 1 month follow-up status post ablation of atrial fibrillation    Referring physician: Dr. Susi Taylor     Primary care physician: Kandra Severs, MD     History of Present Illness: This visit 1/14/2021  Patient is here today for 1 month follow-up status post ablation of atrial fibrillation. Patient reports he has been feeling well. He states that he has had a few episodes where he will roll over in bed and feel his heart beat. He denies palpitations he denies tachycardia. He additionally denies chest pain, lightheadedness, dizziness, edema or syncope. He is abstaining from alcohol and caffeine. Previous visit  [facility] states that he is feeling well. He has noticed palpitations approximate 3 days post discharge. He did have an episode of shortness of breath which was transient during the palpitations. Lasting only a few seconds. Has also noted occasional dizziness. No syncope or near syncope    Previous visit on 10/14/2020  Patient is a 69 yr old male with hx of atrial fibrillation referred here for management of atrial fibrillation. Pt states he feels fluttering and skipping when laying down. Tiredness and weakness. Pt denies chest pain, shortness of breath, dizziness, and edema. Pt drinks two cups of coffee daily.     Past Medical History:   Diagnosis Date    Atrial fibrillation [facility])     follows with Dr Ran Prieto CHF (congestive heart failure) ([facility] [address] 03/2020    Enlarged prostate     H/O echocardiogram 05/21/2020    Mild prolapse anterior mitral valve leaflet,EF20%,no thrombus    H/O percutaneous left heart catheterization     [place] (hard of hearing)     no hearing aides    Hypotension     Indwelling catheter present on admission     \"had catheter in since March 2020\"    Iron deficiency anemia     PONV (postoperative nausea and vomiting)     \"sick in 1981 but did fine after that with other surgeries\"    S/P ablation of atrial fibrillation 12/14/2020    atrial fibrillation ablation with Dr. Claudia Bonner.  UTI (urinary tract infection)     week 7/20/2020- was on antibiotic- had repeat urine test 7/27/2020 but never heard the results\"    Wears glasses      Past Surgical History:   Procedure Laterality Date    ARM SURGERY Left 1981    \"have plate and screws still in place\"   [address]      per old chart had cath 3/2020    CARDIOVERSION  05/2020    \"had cardioversion- worked for some time but the atrial fib is back\"    COLONOSCOPY  2010    EYE SURGERY  2015    left eye cataract ext     TURP N/A 8/6/2020    CYSTOSCOPY W/BIPOLAR TURP REMOVAL OF BLADDER STONES performed by Vicente Zambrano MD at [address] History   Problem Relation Age of Onset    Cancer Mother         thyroid cancer    Cancer Father         lymphoma     Social History     Tobacco Use    Smoking status: Never Smoker    Smokeless tobacco: Never Used   Substance Use Topics    Alcohol use: Not Currently     Comment: \"drink 1-2 beers per day\"        Review of Systems   Constitution: Negative for diaphoresis and malaise/fatigue. HENT: Negative for congestion, hoarse voice and tinnitus. Eyes: Negative for pain and visual disturbance. Cardiovascular: Negative for chest pain, dyspnea on exertion, irregular heartbeat, leg swelling, near-syncope, palpitations and paroxysmal nocturnal dyspnea. Respiratory: Negative for cough and shortness of breath. Endocrine: Negative for cold intolerance and heat intolerance. Hematologic/Lymphatic: Negative for adenopathy and bleeding problem. Skin: Negative for color change and poor wound healing. Musculoskeletal: Positive for arthritis. Negative for muscle weakness. Gastrointestinal: Negative for abdominal pain and melena. Genitourinary: Negative for flank pain and hematuria. Neurological: Negative for dizziness and light-headedness.    Psychiatric/Behavioral: Negative for depression. The patient is not nervous/anxious. /88   Pulse 76   Temp 97.9 °F (36.6 °C) (Temporal)   Ht 6' 4\" (1.93 m)   Wt 199 lb 6.4 oz (90.4 kg)   BMI 24.27 kg/m²   Wt Readings from Last 3 Encounters:   01/14/21 199 lb 6.4 oz (90.4 kg)   12/15/20 196 lb 2 oz (89 kg)   11/20/20 193 lb 9.6 oz (87.8 kg)       Current Outpatient Medications   Medication Sig Dispense Refill    ibuprofen (ADVIL;MOTRIN) 200 MG tablet Take 400 mg by mouth PRN headache      apixaban (ELIQUIS) 5 MG TABS tablet Take 1 tablet by mouth 2 times daily 60 tablet 11    lisinopril (PRINIVIL;ZESTRIL) 2.5 MG tablet Take 1 tablet by mouth daily 30 tablet 11    pantoprazole (PROTONIX) 40 MG tablet Take 1 tablet by mouth daily 30 tablet 5    metoprolol succinate (TOPROL XL) 25 MG extended release tablet Take 1 tablet by mouth 2 times daily 60 tablet 3    dofetilide (TIKOSYN) 250 MCG capsule Take 1 capsule by mouth every 12 hours 60 capsule 3     No current facility-administered medications for this visit. Physical Exam  Constitutional:       Appearance: Normal appearance. He is not ill-appearing or diaphoretic. HENT:      Head: Normocephalic and atraumatic. Right Ear: External ear normal.      Left Ear: External ear normal.      Nose: Nose normal.      Mouth/Throat:      Mouth: Mucous membranes are moist.      Pharynx: Oropharynx is clear. Eyes:      General:         Right eye: No discharge. Left eye: No discharge. Pupils: Pupils are equal, round, and reactive to light. Neck:      Musculoskeletal: Normal range of motion and neck supple. Vascular: No carotid bruit. Cardiovascular:      Rate and Rhythm: Normal rate and regular rhythm. Pulses: Normal pulses. Heart sounds: No murmur. Pulmonary:      Effort: Pulmonary effort is normal.      Breath sounds: Normal breath sounds. No wheezing or rhonchi. Abdominal:      General: There is no distension.       Palpations: Abdomen is soft.      Tenderness: There is no abdominal tenderness. Musculoskeletal: Normal range of motion. Right lower leg: No edema. Left lower leg: No edema. Skin:     General: Skin is warm. Capillary Refill: Capillary refill takes less than 2 seconds. Neurological:      General: No focal deficit present. Mental Status: He is alert and oriented to person, place, and time. Psychiatric:         Mood and Affect: Mood normal.         Behavior: Behavior normal.       DATA:  Lab Results   Component Value Date    TROPONINT 0.010 (H) 03/07/2020     BNP:    Lab Results   Component Value Date    PROBNP 10,332 (H) 03/06/2020     PT/INR:  No results found for: PTINR  No results found for: LABA1C  Lab Results   Component Value Date    CHOL 150 03/10/2020    TRIG 83 03/10/2020    HDL 44 03/10/2020    LDLDIRECT 99 03/10/2020     Lab Results   Component Value Date    ALT 20 12/15/2020    AST 33 12/15/2020     TSH: No results found for: TSH      Impression and recommendations:     Status post ablation of atrial fibrillation  History of Tikosyn monitoring therapy  HTN    Patient is 1 month status post ablation of atrial fibrillation  He reports overall has been feeling well  He states that he has had few episodes in the night where he can feel his heart beating, however he denies palpitations or tachycardia  I discussed 3-month blanking period with patient post procedure  He voiced understanding  EKG obtained-patient in sinus rhythm heart rate 69.   Patient is on Tikosyn  -this is within normal range  Continue Tikosyn and anticoagulation  Patient additionally to continue Toprol-XL 25 mg twice daily  Vitals:    01/14/21 0956   BP: 118/88   Pulse: 76   Temp: 97.9 °F (36.6 °C)     Blood pressure stable continue current medical management of Toprol-XL 25 mg twice daily and lisinopril 2.5 mg daily    Patient to follow-up with Silvia Higgins in 2 months  Patient would like to come off some of his medications at next

## 2021-03-18 ENCOUNTER — NURSE ONLY (OUTPATIENT)
Dept: CARDIOLOGY CLINIC | Age: 71
End: 2021-03-18
Payer: MEDICARE

## 2021-03-18 ENCOUNTER — OFFICE VISIT (OUTPATIENT)
Dept: CARDIOLOGY CLINIC | Age: 71
End: 2021-03-18
Payer: MEDICARE

## 2021-03-18 VITALS
HEART RATE: 65 BPM | DIASTOLIC BLOOD PRESSURE: 74 MMHG | HEIGHT: 76 IN | WEIGHT: 198.4 LBS | BODY MASS INDEX: 24.16 KG/M2 | TEMPERATURE: 97.2 F | SYSTOLIC BLOOD PRESSURE: 116 MMHG

## 2021-03-18 DIAGNOSIS — R00.2 PALPITATIONS: ICD-10-CM

## 2021-03-18 DIAGNOSIS — I10 ESSENTIAL HYPERTENSION: ICD-10-CM

## 2021-03-18 DIAGNOSIS — Z98.890 S/P ABLATION OF ATRIAL FIBRILLATION: Primary | ICD-10-CM

## 2021-03-18 DIAGNOSIS — Z86.79 S/P ABLATION OF ATRIAL FIBRILLATION: Primary | ICD-10-CM

## 2021-03-18 DIAGNOSIS — R00.2 PALPITATIONS: Primary | ICD-10-CM

## 2021-03-18 PROCEDURE — G8482 FLU IMMUNIZE ORDER/ADMIN: HCPCS | Performed by: NURSE PRACTITIONER

## 2021-03-18 PROCEDURE — 1036F TOBACCO NON-USER: CPT | Performed by: NURSE PRACTITIONER

## 2021-03-18 PROCEDURE — G8420 CALC BMI NORM PARAMETERS: HCPCS | Performed by: NURSE PRACTITIONER

## 2021-03-18 PROCEDURE — 1123F ACP DISCUSS/DSCN MKR DOCD: CPT | Performed by: NURSE PRACTITIONER

## 2021-03-18 PROCEDURE — 4040F PNEUMOC VAC/ADMIN/RCVD: CPT | Performed by: NURSE PRACTITIONER

## 2021-03-18 PROCEDURE — 93228 REMOTE 30 DAY ECG REV/REPORT: CPT | Performed by: INTERNAL MEDICINE

## 2021-03-18 PROCEDURE — 99213 OFFICE O/P EST LOW 20 MIN: CPT | Performed by: NURSE PRACTITIONER

## 2021-03-18 PROCEDURE — 93000 ELECTROCARDIOGRAM COMPLETE: CPT | Performed by: NURSE PRACTITIONER

## 2021-03-18 PROCEDURE — 3017F COLORECTAL CA SCREEN DOC REV: CPT | Performed by: NURSE PRACTITIONER

## 2021-03-18 PROCEDURE — G8427 DOCREV CUR MEDS BY ELIG CLIN: HCPCS | Performed by: NURSE PRACTITIONER

## 2021-03-18 NOTE — LETTER
Gayle [facility]  1950  [id number]    Have you had any Chest Pain that is not new? - No        Have you had any Shortness of Breath - No  Have you had any dizziness - No  Have you had any palpitations that are not new?  - No  Do you have any edema - swelling in No      Do you have a surgery or procedure scheduled in the near future - No

## 2021-03-18 NOTE — PROGRESS NOTES
Electrophysiology Follow up    Reason for consultation: fluttering      Chief complaint: 3 month follow-up status post ablation of atrial fibrillation    Referring physician: Dr. Kishor Coffman     Primary care physician: Ruiz Millan MD     History of Present Illness: This visit 3/18/2021  Patient is here today for 3-month follow-up status post ablation of atrial fibrillation. He reports that he continues to have episodes of palpitations in the evening time and when in bed. He denies tachycardia. He denies chest pain, lightheadedness, shortness of breath, dizziness, edema or syncope. He continues to refrain from caffeine, alcohol and smoking. Previous visit 1/14/2021  Patient is here today for 1 month follow-up status post ablation of atrial fibrillation. Patient reports he has been feeling well. He states that he has had a few episodes where he will roll over in bed and feel his heart beat. He denies palpitations he denies tachycardia. He additionally denies chest pain, lightheadedness, dizziness, edema or syncope. He is abstaining from alcohol and caffeine. Previous visit  [facility] states that he is feeling well. He has noticed palpitations approximate 3 days post discharge. He did have an episode of shortness of breath which was transient during the palpitations. Lasting only a few seconds. Has also noted occasional dizziness. No syncope or near syncope    Previous visit on 10/14/2020  Patient is a 69 yr old male with hx of atrial fibrillation referred here for management of atrial fibrillation. Pt states he feels fluttering and skipping when laying down. Tiredness and weakness. Pt denies chest pain, shortness of breath, dizziness, and edema. Pt drinks two cups of coffee daily.     Past Medical History:   Diagnosis Date    Atrial fibrillation [facility])     follows with Dr Pinky Longoria CHF (congestive heart failure) ([facility] [address] 03/2020    Enlarged prostate     H/O echocardiogram 05/21/2020    Mild prolapse anterior mitral valve leaflet,EF20%,no thrombus    H/O percutaneous left heart catheterization     [place] (hard of hearing)     no hearing aides    Hypotension     Indwelling catheter present on admission     \"had catheter in since March 2020\"    Iron deficiency anemia     PONV (postoperative nausea and vomiting)     \"sick in 1981 but did fine after that with other surgeries\"    S/P ablation of atrial fibrillation 12/14/2020    atrial fibrillation ablation with Dr. Dwight Palomo.  UTI (urinary tract infection)     week 7/20/2020- was on antibiotic- had repeat urine test 7/27/2020 but never heard the results\"    Wears glasses      Past Surgical History:   Procedure Laterality Date    ARM SURGERY Left 1981    \"have plate and screws still in place\"   [address]      per old chart had cath 3/2020    CARDIOVERSION  05/2020    \"had cardioversion- worked for some time but the atrial fib is back\"    COLONOSCOPY  2010    EYE SURGERY  2015    left eye cataract ext     TURP N/A 8/6/2020    CYSTOSCOPY W/BIPOLAR TURP REMOVAL OF BLADDER STONES performed by Hilary Paige MD at [address] History   Problem Relation Age of Onset    Cancer Mother         thyroid cancer    Cancer Father         lymphoma     Social History     Tobacco Use    Smoking status: Never Smoker    Smokeless tobacco: Never Used   Substance Use Topics    Alcohol use: Not Currently     Comment: \"drink 1-2 beers per day\"        Review of Systems   Constitution: Negative for diaphoresis and malaise/fatigue. HENT: Negative for congestion, hoarse voice and tinnitus. Eyes: Negative for pain and visual disturbance. Cardiovascular: Negative for chest pain, dyspnea on exertion, irregular heartbeat, leg swelling, near-syncope, positive for palpitation  Respiratory: Negative for cough and shortness of breath. Endocrine: Negative for cold intolerance and heat intolerance.    Hematologic/Lymphatic: Negative for adenopathy and carotid bruit. Cardiovascular:      Rate and Rhythm: Normal rate and regular rhythm. Pulses: Normal pulses. Heart sounds: No murmur. Pulmonary:      Effort: Pulmonary effort is normal.      Breath sounds: Normal breath sounds. No wheezing or rhonchi. Abdominal:      General: There is no distension. Palpations: Abdomen is soft. Tenderness: There is no abdominal tenderness. Musculoskeletal: Normal range of motion. Right lower leg: No edema. Left lower leg: No edema. Skin:     General: Skin is warm. Capillary Refill: Capillary refill takes less than 2 seconds. Neurological:      General: No focal deficit present. Mental Status: He is alert and oriented to person, place, and time.    Psychiatric:         Mood and Affect: Mood normal.         Behavior: Behavior normal.       DATA:  Lab Results   Component Value Date    TROPONINT 0.010 (H) 03/07/2020     BNP:    Lab Results   Component Value Date    PROBNP 10,332 (H) 03/06/2020     PT/INR:  No results found for: PTINR  No results found for: LABA1C  Lab Results   Component Value Date    CHOL 150 03/10/2020    TRIG 83 03/10/2020    HDL 44 03/10/2020    LDLDIRECT 99 03/10/2020     Lab Results   Component Value Date    ALT 20 12/15/2020    AST 33 12/15/2020     TSH: No results found for: TSH      Impression and recommendations:     Status post ablation of atrial fibrillation  History of Tikosyn monitoring therapy  HTN    Patient here for 3-month follow-up status post ablation of atrial fibrillation  He is avoiding alcohol caffeine and smoking  He is taking his medications as prescribed  He states that he would like to come off some medications  He reports that he does have some intermittent palpitations in the evening and when laying in bed at night  EKG shows sinus rhythm-QTc is 446  This is within range to continue Tikosyn  Will continue Tikosyn 250 mcg capsules 2 times a day  HF/LV dysfunction No (0)   HTN   Yes (1)  Age greater /equal 75 No (0)   DM No (0)   Stroke/TIA/TE No (0)   Vascular Disease No (0)   Age 74-69  Yes (1)  Sex Male  (0)     Chads score of 2  Continue anticoagulation    Will get 30-day event monitor to assess for any further episodes of atrial fibrillation      Vitals:    03/18/21 1140   BP: 116/74   Pulse: 65   Temp: 97.2 °F (36.2 °C)     Blood pressure is stable-continue lisinopril 2.5 mg daily and Toprol-XL 25 mg twice daily    Patient to follow-up in 6 weeks with Dr Kieth Snellen, APRN - CNP on 3/18/2021 at 12:26 PM

## 2021-03-18 NOTE — PROGRESS NOTES
30 days e-cardio monitor placed.  # [id number]. Instructed patient on how to record the event and to call monitoring center at [phone] if any problems arise. Instructed patient to disconnect the lead wires from the electrodes before bathing or showering and reattach them afterwards. Instructed patient that the electrodes should be changed every 3 days or if they no longer adhere to the skin. Patient to mail package after the monitor has ended. Patient verbalized understanding.       *[id number] applied monitor*

## 2021-03-29 RX ORDER — LISINOPRIL 2.5 MG/1
2.5 TABLET ORAL DAILY
Qty: 90 TABLET | Refills: 3 | Status: SHIPPED | OUTPATIENT
Start: 2021-03-29 | End: 2022-03-15 | Stop reason: SDUPTHER

## 2021-03-29 RX ORDER — PANTOPRAZOLE SODIUM 40 MG/1
40 TABLET, DELAYED RELEASE ORAL DAILY
Qty: 90 TABLET | Refills: 3 | Status: SHIPPED | OUTPATIENT
Start: 2021-03-29 | End: 2022-04-20 | Stop reason: SDUPTHER

## 2021-04-19 RX ORDER — METOPROLOL SUCCINATE 25 MG/1
25 TABLET, EXTENDED RELEASE ORAL 2 TIMES DAILY
Qty: 60 TABLET | Refills: 3 | Status: SHIPPED | OUTPATIENT
Start: 2021-04-19 | End: 2022-03-15 | Stop reason: SDUPTHER

## 2021-04-29 ENCOUNTER — TELEPHONE (OUTPATIENT)
Dept: CARDIOLOGY CLINIC | Age: 71
End: 2021-04-29

## 2021-04-29 NOTE — TELEPHONE ENCOUNTER
Attempted to reach patient. Home number goes straight to voice mail, that isn't set up yet. And Mobile number was answered by a woman who said she would have patient call back if he needs to reschedule appt. Will reschedule appt when patient calls back.

## 2021-04-30 ENCOUNTER — TELEPHONE (OUTPATIENT)
Dept: CARDIOLOGY CLINIC | Age: 71
End: 2021-04-30

## 2021-05-04 ENCOUNTER — OFFICE VISIT (OUTPATIENT)
Dept: CARDIOLOGY CLINIC | Age: 71
End: 2021-05-04
Payer: MEDICARE

## 2021-05-04 VITALS
SYSTOLIC BLOOD PRESSURE: 126 MMHG | HEART RATE: 76 BPM | DIASTOLIC BLOOD PRESSURE: 82 MMHG | BODY MASS INDEX: 23.14 KG/M2 | HEIGHT: 76 IN | WEIGHT: 190 LBS

## 2021-05-04 DIAGNOSIS — Z86.79 S/P ABLATION OF ATRIAL FIBRILLATION: Primary | ICD-10-CM

## 2021-05-04 DIAGNOSIS — Z98.890 S/P ABLATION OF ATRIAL FIBRILLATION: Primary | ICD-10-CM

## 2021-05-04 DIAGNOSIS — I10 ESSENTIAL HYPERTENSION: ICD-10-CM

## 2021-05-04 PROCEDURE — 4040F PNEUMOC VAC/ADMIN/RCVD: CPT | Performed by: NURSE PRACTITIONER

## 2021-05-04 PROCEDURE — 1036F TOBACCO NON-USER: CPT | Performed by: NURSE PRACTITIONER

## 2021-05-04 PROCEDURE — G8427 DOCREV CUR MEDS BY ELIG CLIN: HCPCS | Performed by: NURSE PRACTITIONER

## 2021-05-04 PROCEDURE — 3017F COLORECTAL CA SCREEN DOC REV: CPT | Performed by: NURSE PRACTITIONER

## 2021-05-04 PROCEDURE — 99214 OFFICE O/P EST MOD 30 MIN: CPT | Performed by: NURSE PRACTITIONER

## 2021-05-04 PROCEDURE — 1123F ACP DISCUSS/DSCN MKR DOCD: CPT | Performed by: NURSE PRACTITIONER

## 2021-05-04 PROCEDURE — G8420 CALC BMI NORM PARAMETERS: HCPCS | Performed by: NURSE PRACTITIONER

## 2021-05-04 NOTE — PROGRESS NOTES
Electrophysiology Follow up    Reason for consultation: fluttering      Chief complaint: 4 month follow-up status post ablation of atrial fibrillation    Referring physician: Dr. Lisbeth Bryan     Primary care physician: Chris Wiseman MD     History of Present Illness: This visit 5/4/2021  Patient here for 4-month follow-up status post ablation of atrial fibrillation. He reports that he is feeling well. He denies episodes of palpitations or tachycardia. He additionally denies chest pain, lightheadedness, dizziness, shortness of breath edema or syncope. He continues to not drink alcohol, caffeine or smoke cigarettes. He is here today to go over his 30-day event monitor as he would like to come off of his Tikosyn. Previous visit 3/18/2021  Patient is here today for 3-month follow-up status post ablation of atrial fibrillation. He reports that he continues to have episodes of palpitations in the evening time and when in bed. He denies tachycardia. He denies chest pain, lightheadedness, shortness of breath, dizziness, edema or syncope. He continues to refrain from caffeine, alcohol and smoking. Previous visit 1/14/2021  Patient is here today for 1 month follow-up status post ablation of atrial fibrillation. Patient reports he has been feeling well. He states that he has had a few episodes where he will roll over in bed and feel his heart beat. He denies palpitations he denies tachycardia. He additionally denies chest pain, lightheadedness, dizziness, edema or syncope. He is abstaining from alcohol and caffeine. Previous visit  Crystaljolie Ledezma states that he is feeling well. He has noticed palpitations approximate 3 days post discharge. He did have an episode of shortness of breath which was transient during the palpitations. Lasting only a few seconds. Has also noted occasional dizziness.   No syncope or near syncope    Previous visit on 10/14/2020  Patient is a 69 yr old male with hx of atrial fibrillation referred here for management of atrial fibrillation. Pt states he feels fluttering and skipping when laying down. Tiredness and weakness. Pt denies chest pain, shortness of breath, dizziness, and edema. Pt drinks two cups of coffee daily. Past Medical History:   Diagnosis Date    Atrial fibrillation [facility])     follows with Dr Erasmo Madrigal CHF (congestive heart failure) ([address] 03/2020    Enlarged prostate     H/O echocardiogram 05/21/2020    Mild prolapse anterior mitral valve leaflet,EF20%,no thrombus    H/O percutaneous left heart catheterization     [place] (hard of hearing)     no hearing aides    Hypotension     Indwelling catheter present on admission     \"had catheter in since March 2020\"    Iron deficiency anemia     PONV (postoperative nausea and vomiting)     \"sick in 1981 but did fine after that with other surgeries\"    S/P ablation of atrial fibrillation 12/14/2020    atrial fibrillation ablation with Dr. Ana Mcqueen.     UTI (urinary tract infection)     week 7/20/2020- was on antibiotic- had repeat urine test 7/27/2020 but never heard the results\"    Wears glasses      Past Surgical History:   Procedure Laterality Date    ARM SURGERY Left 1981    \"have plate and screws still in place\"   [address]      per old chart had cath 3/2020    CARDIOVERSION  05/2020    \"had cardioversion- worked for some time but the atrial fib is back\"    COLONOSCOPY  2010    EYE SURGERY  2015    left eye cataract ext     TURP N/A 8/6/2020    CYSTOSCOPY W/BIPOLAR TURP REMOVAL OF BLADDER STONES performed by Rob Deutsch MD at [address] History   Problem Relation Age of Onset    Cancer Mother         thyroid cancer    Cancer Father         lymphoma     Social History     Tobacco Use    Smoking status: Never Smoker    Smokeless tobacco: Never Used   Substance Use Topics    Alcohol use: Not Currently     Comment: \"drink 1-2 beers per day\"        Review of Systems   Constitution: Negative for diaphoresis and malaise/fatigue. HENT: Negative for congestion, hoarse voice and tinnitus. Eyes: Negative for pain and visual disturbance. Cardiovascular: Negative for chest pain, dyspnea on exertion, irregular heartbeat, leg swelling, near-syncope, positive for palpitation  Respiratory: Negative for cough and shortness of breath. Endocrine: Negative for cold intolerance and heat intolerance. Hematologic/Lymphatic: Negative for adenopathy and bleeding problem. Skin: Negative for color change and poor wound healing. Musculoskeletal: Positive for arthritis. Negative for muscle weakness. Gastrointestinal: Negative for abdominal pain and melena. Genitourinary: Negative for flank pain and hematuria. Neurological: Negative for dizziness and light-headedness. Psychiatric/Behavioral: Negative for depression. The patient is not nervous/anxious. /82   Pulse 76   Ht 6' 4\" (1.93 m)   Wt 190 lb (86.2 kg)   BMI 23.13 kg/m²   Wt Readings from Last 3 Encounters:   05/04/21 190 lb (86.2 kg)   03/18/21 198 lb 6.4 oz (90 kg)   01/14/21 199 lb 6.4 oz (90.4 kg)       Current Outpatient Medications   Medication Sig Dispense Refill    metoprolol succinate (TOPROL XL) 25 MG extended release tablet Take 1 tablet by mouth 2 times daily 60 tablet 3    lisinopril (PRINIVIL;ZESTRIL) 2.5 MG tablet Take 1 tablet by mouth daily 90 tablet 3    pantoprazole (PROTONIX) 40 MG tablet Take 1 tablet by mouth daily 90 tablet 3    dofetilide (TIKOSYN) 250 MCG capsule Take 1 capsule by mouth every 12 hours 180 capsule 0    ibuprofen (ADVIL;MOTRIN) 200 MG tablet Take 400 mg by mouth PRN headache      apixaban (ELIQUIS) 5 MG TABS tablet Take 1 tablet by mouth 2 times daily 60 tablet 11     No current facility-administered medications for this visit. Physical Exam  Constitutional:       Appearance: Normal appearance. He is not ill-appearing or diaphoretic. HENT:      Head: Normocephalic and atraumatic. Right Ear: External ear normal.      Left Ear: External ear normal.      Nose: Nose normal.      Mouth/Throat:      Mouth: Mucous membranes are moist.      Pharynx: Oropharynx is clear. Eyes:      General:         Right eye: No discharge. Left eye: No discharge. Pupils: Pupils are equal, round, and reactive to light. Neck:      Musculoskeletal: Normal range of motion and neck supple. Vascular: No carotid bruit. Cardiovascular:      Rate and Rhythm: Normal rate and regular rhythm. Pulses: Normal pulses. Heart sounds: No murmur. Pulmonary:      Effort: Pulmonary effort is normal.      Breath sounds: Normal breath sounds. No wheezing or rhonchi. Abdominal:      General: There is no distension. Palpations: Abdomen is soft. Tenderness: There is no abdominal tenderness. Musculoskeletal: Normal range of motion. Right lower leg: No edema. Left lower leg: No edema. Skin:     General: Skin is warm. Capillary Refill: Capillary refill takes less than 2 seconds. Neurological:      General: No focal deficit present. Mental Status: He is alert and oriented to person, place, and time. Psychiatric:         Mood and Affect: Mood normal.         Behavior: Behavior normal.       DATA:  Lab Results   Component Value Date    TROPONINT 0.010 (H) 03/07/2020     BNP:    Lab Results   Component Value Date    PROBNP 10,332 (H) 03/06/2020     PT/INR:  No results found for: PTINR  No results found for: LABA1C  Lab Results   Component Value Date    CHOL 150 03/10/2020    TRIG 83 03/10/2020    HDL 44 03/10/2020    LDLDIRECT 99 03/10/2020     Lab Results   Component Value Date    ALT 20 12/15/2020    AST 33 12/15/2020     TSH: No results found for: TSH      Impression and recommendations:     Status post ablation of atrial fibrillation  History of Tikosyn monitoring therapy  HTN    30-day event monitor reviewed with patient.   Minimum heart rate 71 bpm  Average heart rate 65 bpm  Maximum heart rate 139 bpm  Patient had isolated PVC and 4 beat of nonsustained VT  No symptoms noted  Discussed plan with Dr Holder Favors  Will stop Tikosyn 250 mcg twice daily  Discussed with patient options of continue Tikosyn versus stopping Tikosyn.   He reports that he has been very fatigued recently and he would like to stop Tikosyn  Continue Toprol XL 25 mg twice daily    HF/LV dysfunction No (0)   HTN   Yes (1)  Age greater /equal 75 No (0)   DM No (0)   Stroke/TIA/TE No (0)   Vascular Disease No (0)   Age 74-69  Yes (1)  Sex Male  (0)     Chads score of 2  Continue anticoagulation    We will continue anticoagulation-Eliquis 5 mg twice daily    Vitals:    05/04/21 1105   BP: 126/82   Pulse: 76     Blood pressure is stable-continue lisinopril 2.5 mg daily and Toprol-XL 25 mg twice daily    Patient to follow-up with Dr. Sajan Johnson as scheduled     ROSALINO Medina CNP on 5/4/2021 at 11:28 AM

## 2021-05-04 NOTE — LETTER
Delfina Wiseman  1950  [id number]    Have you had any Chest Pain that is not new? - No      Have you had any Shortness of Breath - Yes  If Yes - When on exertion    Have you had any dizziness - No    Have you had any palpitations that are not new?  - Yes  If Yes DO EKG - Do you feel your heart pounding  How long does it last - ..  seconds     Is the patient on any of the following medications - Dofetilide (Tikosyn)  If Yes DO EKG - Needs done every 6 months    Do you have any edema - swelling in No

## 2021-06-25 ENCOUNTER — TELEPHONE (OUTPATIENT)
Dept: CARDIOLOGY CLINIC | Age: 71
End: 2021-06-25

## 2021-06-28 ENCOUNTER — TELEPHONE (OUTPATIENT)
Dept: CARDIOLOGY CLINIC | Age: 71
End: 2021-06-28

## 2021-06-28 NOTE — TELEPHONE ENCOUNTER
Pt calling to schedule ov with Rose Marie Clifford.  States he called on Friday and did not receive a return call

## 2021-06-30 ENCOUNTER — OFFICE VISIT (OUTPATIENT)
Dept: CARDIOLOGY CLINIC | Age: 71
End: 2021-06-30
Payer: MEDICARE

## 2021-06-30 VITALS
WEIGHT: 187 LBS | HEART RATE: 96 BPM | SYSTOLIC BLOOD PRESSURE: 104 MMHG | HEIGHT: 76 IN | DIASTOLIC BLOOD PRESSURE: 64 MMHG | BODY MASS INDEX: 22.77 KG/M2

## 2021-06-30 DIAGNOSIS — R00.2 PALPITATIONS: ICD-10-CM

## 2021-06-30 DIAGNOSIS — I47.1 ATRIAL TACHYCARDIA (HCC): Primary | ICD-10-CM

## 2021-06-30 DIAGNOSIS — I10 ESSENTIAL HYPERTENSION: ICD-10-CM

## 2021-06-30 PROBLEM — I47.19 ATRIAL TACHYCARDIA: Status: ACTIVE | Noted: 2021-06-30

## 2021-06-30 PROCEDURE — G8428 CUR MEDS NOT DOCUMENT: HCPCS | Performed by: NURSE PRACTITIONER

## 2021-06-30 PROCEDURE — 93000 ELECTROCARDIOGRAM COMPLETE: CPT | Performed by: NURSE PRACTITIONER

## 2021-06-30 PROCEDURE — 4040F PNEUMOC VAC/ADMIN/RCVD: CPT | Performed by: NURSE PRACTITIONER

## 2021-06-30 PROCEDURE — 1123F ACP DISCUSS/DSCN MKR DOCD: CPT | Performed by: NURSE PRACTITIONER

## 2021-06-30 PROCEDURE — 1036F TOBACCO NON-USER: CPT | Performed by: NURSE PRACTITIONER

## 2021-06-30 PROCEDURE — 3017F COLORECTAL CA SCREEN DOC REV: CPT | Performed by: NURSE PRACTITIONER

## 2021-06-30 PROCEDURE — 99214 OFFICE O/P EST MOD 30 MIN: CPT | Performed by: NURSE PRACTITIONER

## 2021-06-30 PROCEDURE — G8420 CALC BMI NORM PARAMETERS: HCPCS | Performed by: NURSE PRACTITIONER

## 2021-06-30 RX ORDER — DILTIAZEM HYDROCHLORIDE 120 MG/1
120 CAPSULE, COATED, EXTENDED RELEASE ORAL DAILY
Qty: 30 CAPSULE | Refills: 0 | Status: SHIPPED | OUTPATIENT
Start: 2021-06-30 | End: 2021-08-17 | Stop reason: ALTCHOICE

## 2021-06-30 NOTE — PROGRESS NOTES
Electrophysiology Follow up    Reason for consultation: fluttering      Chief complaint: Dizziness, fatigue, palpitations    Referring physician: Dr. Nanda Faulkner     Primary care physician: Naveed Brownlee MD     History of Present Illness: This visit 6/30/2021  Patient is here today with complaints of dizziness, fatigue, palpitations. He reports the symptoms started about 2 weeks ago. He states they are intermittent in nature. He reports he will have these symptoms both at rest and with ambulation. He denies alleviating factors. He states that he will wake up in the middle of the night having palpitations. He states that it is a pounding sensation that lasts 5 to 15 minutes. He reports this is happening every night. He will have associated chest tightness shortness of breath and lightheadedness. Patient is taking his medications as prescribed. He states that he recently started drinking 1 beer a day. Previous visit 5/4/2021  Patient here for 4-month follow-up status post ablation of atrial fibrillation. He reports that he is feeling well. He denies episodes of palpitations or tachycardia. He additionally denies chest pain, lightheadedness, dizziness, shortness of breath edema or syncope. He continues to not drink alcohol, caffeine or smoke cigarettes. He is here today to go over his 30-day event monitor as he would like to come off of his Tikosyn. Previous visit 3/18/2021  Patient is here today for 3-month follow-up status post ablation of atrial fibrillation. He reports that he continues to have episodes of palpitations in the evening time and when in bed. He denies tachycardia. He denies chest pain, lightheadedness, shortness of breath, dizziness, edema or syncope. He continues to refrain from caffeine, alcohol and smoking. Previous visit 1/14/2021  Patient is here today for 1 month follow-up status post ablation of atrial fibrillation. Patient reports he has been feeling well.   He states that he has had a few episodes where he will roll over in bed and feel his heart beat. He denies palpitations he denies tachycardia. He additionally denies chest pain, lightheadedness, dizziness, edema or syncope. He is abstaining from alcohol and caffeine. Previous visit  Christos Pozo states that he is feeling well. He has noticed palpitations approximate 3 days post discharge. He did have an episode of shortness of breath which was transient during the palpitations. Lasting only a few seconds. Has also noted occasional dizziness. No syncope or near syncope    Previous visit on 10/14/2020  Patient is a 69 yr old male with hx of atrial fibrillation referred here for management of atrial fibrillation. Pt states he feels fluttering and skipping when laying down. Tiredness and weakness. Pt denies chest pain, shortness of breath, dizziness, and edema. Pt drinks two cups of coffee daily. Past Medical History:   Diagnosis Date    Atrial fibrillation [facility])     follows with Dr Kraig Zheng CHF (congestive heart failure) ([facility] [address] 03/2020    Enlarged prostate     H/O echocardiogram 05/21/2020    Mild prolapse anterior mitral valve leaflet,EF20%,no thrombus    H/O percutaneous left heart catheterization     [place] (hard of hearing)     no hearing aides    Hypotension     Indwelling catheter present on admission     \"had catheter in since March 2020\"    Iron deficiency anemia     PONV (postoperative nausea and vomiting)     \"sick in 1981 but did fine after that with other surgeries\"    S/P ablation of atrial fibrillation 12/14/2020    atrial fibrillation ablation with Dr. Rose Marie Clifford.     UTI (urinary tract infection)     week 7/20/2020- was on antibiotic- had repeat urine test 7/27/2020 but never heard the results\"    Wears glasses      Past Surgical History:   Procedure Laterality Date    ARM SURGERY Left 1981    \"have plate and screws still in place\"   [address]      per old chart had cath 3/2020   Josh Olivier CARDIOVERSION  05/2020    \"had cardioversion- worked for some time but the atrial fib is back\"    COLONOSCOPY  2010    EYE SURGERY  2015    left eye cataract ext     TURP N/A 8/6/2020    CYSTOSCOPY W/BIPOLAR TURP REMOVAL OF BLADDER STONES performed by Willy Saxena MD at [address] History   Problem Relation Age of Onset    Cancer Mother         thyroid cancer    Cancer Father         lymphoma     Social History     Tobacco Use    Smoking status: Never Smoker    Smokeless tobacco: Never Used   Substance Use Topics    Alcohol use: Not Currently     Comment: \"drink 1-2 beers per day\"        Review of Systems   Constitution: Negative for diaphoresis and malaise/fatigue. HENT: Negative for congestion, hoarse voice and tinnitus. Eyes: Negative for pain and visual disturbance. Cardiovascular: Negative for chest pain, positive dyspnea on exertion, positive irregular heartbeat, negative leg swelling, negative near-syncope, positive for palpitation  Respiratory: Negative for cough and shortness of breath. Endocrine: Negative for cold intolerance and heat intolerance. Hematologic/Lymphatic: Negative for adenopathy and bleeding problem. Skin: Negative for color change and poor wound healing. Musculoskeletal: Positive for arthritis. Negative for muscle weakness. Gastrointestinal: Negative for abdominal pain and melena. Genitourinary: Negative for flank pain and hematuria. Neurological: Positive for dizziness and light-headedness. Psychiatric/Behavioral: Negative for depression. The patient is not nervous/anxious.         /64   Pulse 96   Ht 6' 4\" (1.93 m)   Wt 187 lb (84.8 kg)   BMI 22.76 kg/m²   Wt Readings from Last 3 Encounters:   06/30/21 187 lb (84.8 kg)   05/04/21 190 lb (86.2 kg)   03/18/21 198 lb 6.4 oz (90 kg)       Current Outpatient Medications   Medication Sig Dispense Refill    dilTIAZem (CARDIZEM CD) 120 MG extended release capsule Take 1 capsule by mouth daily 30 capsule 0    metoprolol succinate (TOPROL XL) 25 MG extended release tablet Take 1 tablet by mouth 2 times daily 60 tablet 3    lisinopril (PRINIVIL;ZESTRIL) 2.5 MG tablet Take 1 tablet by mouth daily 90 tablet 3    pantoprazole (PROTONIX) 40 MG tablet Take 1 tablet by mouth daily 90 tablet 3    ibuprofen (ADVIL;MOTRIN) 200 MG tablet Take 400 mg by mouth PRN headache      apixaban (ELIQUIS) 5 MG TABS tablet Take 1 tablet by mouth 2 times daily 60 tablet 11     No current facility-administered medications for this visit. Physical Exam  Constitutional:       Appearance: Normal appearance. He is not ill-appearing or diaphoretic. HENT:      Head: Normocephalic and atraumatic. Right Ear: External ear normal.      Left Ear: External ear normal.      Nose: Nose normal.      Mouth/Throat:      Mouth: Mucous membranes are moist.      Pharynx: Oropharynx is clear. Eyes:      General:         Right eye: No discharge. Left eye: No discharge. Pupils: Pupils are equal, round, and reactive to light. Neck:      Musculoskeletal: Normal range of motion and neck supple. Vascular: No carotid bruit. Cardiovascular:      Rate and Rhythm: Normal rate and regular rhythm. Pulses: Normal pulses. Heart sounds: No murmur. Pulmonary:      Effort: Pulmonary effort is normal.      Breath sounds: Normal breath sounds. No wheezing or rhonchi. Abdominal:      General: There is no distension. Palpations: Abdomen is soft. Tenderness: There is no abdominal tenderness. Musculoskeletal: Normal range of motion. Right lower leg: No edema. Left lower leg: No edema. Skin:     General: Skin is warm. Capillary Refill: Capillary refill takes less than 2 seconds. Neurological:      General: No focal deficit present. Mental Status: He is alert and oriented to person, place, and time.    Psychiatric:         Mood and Affect: Mood normal.         Behavior: Behavior normal.

## 2021-07-06 ENCOUNTER — TELEPHONE (OUTPATIENT)
Dept: CARDIOLOGY CLINIC | Age: 71
End: 2021-07-06

## 2021-07-06 NOTE — TELEPHONE ENCOUNTER
Cardiologist: Dr. Michael Watts  Surgeon: Dr. Raleigh Jacobs  Surgery: cystoscopy, TURP  Anesthesia: General  Date: ASA  FAX# [phone]  # [phone]    Last OV 6/30/2021 w/Lorraine         Atrial tachycardia  Status post ablation of atrial fibrillation  History of Tikosyn monitoring therapy  HTN      NM- none    Echo- 12/14/2020   This is a limited study s/p ablation to r/o pericardial effusion. Left ventricular systolic function is normal LVEF 50%   Mild to moderate mitral regurgitation.    There is a trivial pericardial effusion    CATH- 3/10/2020   NO CAD   NONISC CARDIOMYOPATHY      Eliquis

## 2021-07-14 ENCOUNTER — ANESTHESIA EVENT (OUTPATIENT)
Dept: CARDIAC CATH/INVASIVE PROCEDURES | Age: 71
End: 2021-07-14
Payer: MEDICARE

## 2021-07-14 ENCOUNTER — OFFICE VISIT (OUTPATIENT)
Dept: CARDIOLOGY CLINIC | Age: 71
End: 2021-07-14
Payer: MEDICARE

## 2021-07-14 VITALS
HEART RATE: 121 BPM | HEIGHT: 76 IN | WEIGHT: 189 LBS | BODY MASS INDEX: 23.02 KG/M2 | SYSTOLIC BLOOD PRESSURE: 108 MMHG | DIASTOLIC BLOOD PRESSURE: 60 MMHG

## 2021-07-14 DIAGNOSIS — I47.1 ATRIAL TACHYCARDIA (HCC): Primary | ICD-10-CM

## 2021-07-14 DIAGNOSIS — I10 ESSENTIAL HYPERTENSION: ICD-10-CM

## 2021-07-14 PROCEDURE — G8420 CALC BMI NORM PARAMETERS: HCPCS | Performed by: NURSE PRACTITIONER

## 2021-07-14 PROCEDURE — 1036F TOBACCO NON-USER: CPT | Performed by: NURSE PRACTITIONER

## 2021-07-14 PROCEDURE — 3017F COLORECTAL CA SCREEN DOC REV: CPT | Performed by: NURSE PRACTITIONER

## 2021-07-14 PROCEDURE — 1123F ACP DISCUSS/DSCN MKR DOCD: CPT | Performed by: NURSE PRACTITIONER

## 2021-07-14 PROCEDURE — G8427 DOCREV CUR MEDS BY ELIG CLIN: HCPCS | Performed by: NURSE PRACTITIONER

## 2021-07-14 PROCEDURE — 99214 OFFICE O/P EST MOD 30 MIN: CPT | Performed by: NURSE PRACTITIONER

## 2021-07-14 PROCEDURE — 4040F PNEUMOC VAC/ADMIN/RCVD: CPT | Performed by: NURSE PRACTITIONER

## 2021-07-14 PROCEDURE — 93000 ELECTROCARDIOGRAM COMPLETE: CPT | Performed by: NURSE PRACTITIONER

## 2021-07-14 NOTE — LETTER
Ruthy Crigler Dr. Caralee Rumps     Transesophageal Echocardiogram with Cardioversion    Patient Name: Donna Velazquez  : 1950  MRN# [id number]     Date of Procedure: 7/15/21 Time: 1130 Arrival Time: [address]: [facility])     Call to [facility] at [phone] 2 days before your procedure    X Please have COVID-19 Test done on Stat on arrival at the hospital. Angel Green will need to Berry Dempsey yourself until procedure. X Please do not have anything by mouth after midnight prior to or 8 hours before the procedure. X You may take your medication with a sip of water unless advised otherwise below. X If taking both Cardizem and metoprolol. HOLD CARDIZEM     X Please continue to take Eliquis (apixaban) as directed. Patient Signature:____________________________     Staff Prepared: Cipriano Bynum RN     Staff Given Instructions:_______________________________                                [facility]  What is cardioversion? Cardioversion helps your heart return to a normal rhythm. It treats problems like atrial fibrillation. It is also sometimes used in emergencies. It can correct a fast heartbeat that causes low blood pressure, chest pain, or heart failure. Your doctor may ask you to take medicines before the treatment. These help prevent blood clots. Your doctor will watch you closely to make sure that there are no problems. Electrical cardioversion: The electrical procedure is done in a hospital. You will get medicine to help you relax and control the pain. Your doctor will put paddles or patches on your chest and back. These send an electric current to your heart. This resets your heart rhythm. The electrical part takes about 5 minutes. But you will probably be in the hospital for 1 to 2 hours. You will need to recover from the effects of the pain medicine.   Chemical cardioversion: The chemical procedure is most often done in a hospital. In most cases, the medicine is put into your arm through a tube called an IV. But you may get medicines to take by mouth. You may feel a quick sting or pinch when the IV starts. The procedure usually takes about 30 to 60 minutes for procedure. Transesophageal Echocardiogram  What is a transesophageal echocardiogram?  A transesophageal echocardiogram is a test to help your doctor look at the inside of your heart. A small device called a transducer directs sound waves toward your heart. The sound waves make a picture of the heart's valves and chambers. Your doctor may do this test to look for certain types of heart disease. Or it may be done to see how disease is affecting your heart. You will be given medicine to make you sleepy and comfortable during the test. The doctor puts a small, flexible tube into your throat and guides it to the esophagus. This is the tube that connects your mouth to your stomach. The doctor will ask you to swallow as the tube goes down. The transducer is at the tip of the tube. It gets close to your heart to make clear pictures. The doctor will look at the ultrasound pictures on a screen. You will not be able to eat or drink until the numbness from the throat spray wears off. Your throat may be sore for a few days after the test.  Follow-up care is a key part of your treatment and safety. Be sure to make and go to all appointments, and call your doctor if you are having problems. It's also a good idea to know your test results and keep a list of the medicines you take. IMPORTANT NOTICE TO PATIENTS: Prior Authorization  We will contact your insurance for prior authorization for the CPT code related to the procedure it is the patient's responsibility to contact their insurance to ensure they are following their medical plans provisions or requirements!                 [address]/CARDIOLOGY        Patient Name: Evan Francois  : 1950  MRN# [id number]    Transesophageal Echocardiogram with Cardioversion    Day of Procedure Cath Lab Holding Area Preop Orders:    ? YOU HAVE MY PERMISSION TO TALK TO THE PATIENT-PLEASE    ? Anesthesia    ? SEEMA with Anesthesia    ? IV peripheral saline lock  (preferred left arm). ? STAT LABS ON ARRIVAL: BMP, MAG, PHOS, CBC, PT INR, PTT    ? If taking Coumadin, PT INR  STAT on arrival day of procedure. ? 12 lead EKG STAT on arrival day of procedure. ? Diabetic patients >> Accu check Blood sugar check. ? Document home medications in EPIC and include date and time of last dose.    ? NPO.    ? Notify Dr. Doneta Boas of abnormal lab results. ? Chest Prep> Clip hair anterior chest and posterior back. ? If Tikosyn or Sotalol loading  Planned >>3 Puma bed            Physician Signature:___________________Date:___________Time:____________                              [facility]) Informed Consent for Anesthesia/Sedation, Surgery, Invasive Procedures, and other High-risk Interventions and Medication use     *This consent is applicable for 30 days following patient signature*    Procedure(s)   I, Evan Francois authorize, Dr. Ashish Paulson   and the associate(s) or assistant(s) of his/her choice, to perform the following procedure(s): Transesophageal Echocardiogram with Cardioversion    I know that unexpected conditions may require additional or different procedures than those above. I authorize the above named practitioner(s) perform these as necessary and desirable. This is based on the practitioners professional judgment.     The above named practitioner has discussed the above procedure(s) with me, including:   Potential benefits, including likelihood of success of the procedure(s) goals   Risks   Side effects, risk of death, and risk of infection   Any potential problems that might occur during recuperation or healing post-procedure   Reasonable alternatives   Risks of NOT performing the procedure(s)    I acknowledge that no warranty or guarantee has been made to the results the procedure(s). I consent to the above named practitioner(s) providing additional services to me as deemed reasonable and necessary, including but not limited to:     Use of medications for anesthesia or sedation.  All anesthesia and sedation carry risks. My practitioner has discussed my anticipated anesthesia and/or sedation and the risks of using, risk of not using, benefits, side effects, and alternatives.  Use of pathology  - I authorize [facility]) to dispose of tissues, specimens or organs when pathology is complete.  Use of radiology  - A contrast agent may be required for radiology procedures. My practitioner has advised me of the risks of using, risks of not using, benefits, side effects, and alternatives.  Observers or use of photography, video/audio recording, or televising of the procedure(s). This is for medical, scientific, or educational purposes. This includes appropriate portions of my body. My identity will not be revealed.  I consent to release of my social security number and other identifying information to [employer] (FDA), and the supplier/, if I receive tissue, a device, or implant. This is to track the tissue, device, or implant for defect, recall, infection, etc.      Use of blood and/or blood products, if needed, through my hospital stay. My practitioner has advised me of the risks of using, risks of not using, benefits, side effects, and alternatives. ___ I do NOT want Blood or Blood products given. (Complete separate  refusal form)    Code Status (ganga one):  ___ I do NOT HAVE a DNR order. I am a Full-code.   I will receive CPR, intubation,  chest compressions, medications, and/or other life saving measures if I have a  cardiac or respiratory arrest.    ___ I have a Do Not Resuscitate (DNR)order.   (ganga one below)  ___  I rescind my DNR for surgery and immediate post-operative period through Phase 2 recovery. This means, for that time period, I will be a Full-code and receive CPR, intubation, chest compressions, medications, and/or other life saving measures, if I have a cardiac or respiratory arrest.    ___ I WANT to keep my DNR in effect during my procedure(s) and immediate post-operative recovery period through Phase 2 recovery. (Complete separate refusal form)     This form has been fully explained to me. I understand its contents. Patients Signature: ___________________________Date: ________  Time: ________    If patient unable to sign, has engaged the [address] [place] [address], is a minor, or has a court-appointed Guardian:  [facility] Representative Name (Print):  ____________________________________      Relationship ([place] one):    Guardian   Parent    Spouse    HCPOA   Child   Sibling  Next-of-Kin Friend    Patients Representative Signature: _______________________________________              Date: ______________  Time: __________    An  was used.    name/ID: _________________________________      [facility]) Witness________________________  Date: ________   Time: _________    Physician/Practitioner _______________________  Date: ________   Time: _________           Revision 2017    [facility]     Dr. Matt Leary     PROCEDURE TO SCHEDULE:    Transesophageal Echocardiogram with Cardioversion    Patient Name: Nahed Negrete   : 1950  MRN# [id number]    Home Phone Number: [phone]   Weight:    Wt Readings from Last 3 Encounters:   21 189 lb (85.7 kg)   21 187 lb (84.8 kg)   21 190 lb (86.2 kg)        Insurance: Payor: Andrea Majano / Plan: [place] / Product Type: *No Product type* /     Date of Procedure: 7/15/21 Time: 1130 Arrival Time: 0930    Diagnosis:  A-tach  Allergies: No Known Allergies       1) Call [facility] scheduling ([phone]) or Instant Message  CONFIRMED WITH     PHONE OR   INSTANT MESSAGE  2) PREAUTHORIZATION NUMBER:    Spoke to:      From date:     expiration date:          Judson Dawkins RN

## 2021-07-14 NOTE — PROGRESS NOTES
Electrophysiology Follow up    Reason for consultation: fluttering      Chief complaint: fatigue, palpitations    Referring physician: Dr. Rhona You     Primary care physician: Tomasa Venegas MD     History of Present Illness: This visit 7/14/2021  Patient is here today for follow-up on tachycardia with associated fatigue. Patient was seen 2 weeks ago and was started on Cardizem. Patient reports that he did not start the Cardizem because he was feeling fatigued. He states that he continues to have fatigue and palpitations that are intermittent in nature. He states the dizziness has resolved. He reports he will have these symptoms both at rest and with ambulation. He reports that symptoms are unchanged from previous office visit. He reports that he has stopped drinking 1 beer a day. He is taking his medications as prescribed. Previous visit 6/30/2021  Patient is here today with complaints of dizziness, fatigue, palpitations. He reports the symptoms started about 2 weeks ago. He states they are intermittent in nature. He reports he will have these symptoms both at rest and with ambulation. He denies alleviating factors. He states that he will wake up in the middle of the night having palpitations. He states that it is a pounding sensation that lasts 5 to 15 minutes. He reports this is happening every night. He will have associated chest tightness shortness of breath and lightheadedness. Patient is taking his medications as prescribed. He states that he recently started drinking 1 beer a day. Previous visit 5/4/2021  Patient here for 4-month follow-up status post ablation of atrial fibrillation. He reports that he is feeling well. He denies episodes of palpitations or tachycardia. He additionally denies chest pain, lightheadedness, dizziness, shortness of breath edema or syncope. He continues to not drink alcohol, caffeine or smoke cigarettes.   He is here today to go over his 30-day event monitor as he would like to come off of his Tikosyn. Previous visit 3/18/2021  Patient is here today for 3-month follow-up status post ablation of atrial fibrillation. He reports that he continues to have episodes of palpitations in the evening time and when in bed. He denies tachycardia. He denies chest pain, lightheadedness, shortness of breath, dizziness, edema or syncope. He continues to refrain from caffeine, alcohol and smoking. Previous visit 1/14/2021  Patient is here today for 1 month follow-up status post ablation of atrial fibrillation. Patient reports he has been feeling well. He states that he has had a few episodes where he will roll over in bed and feel his heart beat. He denies palpitations he denies tachycardia. He additionally denies chest pain, lightheadedness, dizziness, edema or syncope. He is abstaining from alcohol and caffeine. Previous visit  [place] states that he is feeling well. He has noticed palpitations approximate 3 days post discharge. He did have an episode of shortness of breath which was transient during the palpitations. Lasting only a few seconds. Has also noted occasional dizziness. No syncope or near syncope    Previous visit on 10/14/2020  Patient is a 69 yr old male with hx of atrial fibrillation referred here for management of atrial fibrillation. Pt states he feels fluttering and skipping when laying down. Tiredness and weakness. Pt denies chest pain, shortness of breath, dizziness, and edema. Pt drinks two cups of coffee daily.     Past Medical History:   Diagnosis Date    Atrial fibrillation [facility])     follow[facility] with Dr Ricky Couch [facility]    Enlarged prostate     H/O echocardiogram 05/21/2020    Mild prolapse anterior mitral valve leaflet,EF20%,no thrombus    H/O percutaneous left heart catheterization     [place] (hard of hearing)     no hearing aides    Hypotension     Indwelling catheter present on admission \"had catheter in since March 2020\"    Iron deficiency anemia     PONV (postoperative nausea and vomiting)     \"sick in 1981 but did fine after that with other surgeries\"    S/P ablation of atrial fibrillation 12/14/2020    atrial fibrillation ablation with Dr. Gerri Montiel.  UTI (urinary tract infection)     week 7/20/2020- was on antibiotic- had repeat urine test 7/27/2020 but never heard the results\"    Wears glasses      Past Surgical History:   Procedure Laterality Date    ARM SURGERY Left 1981    \"have plate and screws still in place\"   [address]      per old chart had cath 3/2020    CARDIOVERSION  05/2020    \"had cardioversion- worked for some time but the atrial fib is back\"    COLONOSCOPY  2010    EYE SURGERY  2015    left eye cataract ext     TURP N/A 8/6/2020    CYSTOSCOPY W/BIPOLAR TURP REMOVAL OF BLADDER STONES performed by Sherlyn Alvarado MD at [address] History   Problem Relation Age of Onset    Cancer Mother         thyroid cancer    Cancer Father         lymphoma     Social History     Tobacco Use    Smoking status: Never Smoker    Smokeless tobacco: Never Used   Substance Use Topics    Alcohol use: Not on file     Comment: \"drink 1-2 beers per day\"        Review of Systems   Constitution: Negative for diaphoresis and malaise/positive for fatigue  HENT: Negative for congestion, hoarse voice and tinnitus. Eyes: Negative for pain and visual disturbance. Cardiovascular: Negative for chest pain, positive dyspnea on exertion, positive irregular heartbeat, negative leg swelling, negative near-syncope, positive for palpitation  Respiratory: Negative for cough and shortness of breath. Endocrine: Negative for cold intolerance and heat intolerance. Hematologic/Lymphatic: Negative for adenopathy and bleeding problem. Skin: Negative for color change and poor wound healing. Musculoskeletal: Positive for arthritis. Negative for muscle weakness. Gastrointestinal: Negative for abdominal pain and melena. Genitourinary: Negative for flank pain and hematuria. Neurological: Positive for dizziness and light-headedness. Psychiatric/Behavioral: Negative for depression. The patient is not nervous/anxious. /60   Pulse 121   Ht 6' 4\" (1.93 m)   Wt 189 lb (85.7 kg)   BMI 23.01 kg/m²   Wt Readings from Last 3 Encounters:   07/14/21 189 lb (85.7 kg)   06/30/21 187 lb (84.8 kg)   05/04/21 190 lb (86.2 kg)       Current Outpatient Medications   Medication Sig Dispense Refill    metoprolol succinate (TOPROL XL) 25 MG extended release tablet Take 1 tablet by mouth 2 times daily 60 tablet 3    lisinopril (PRINIVIL;ZESTRIL) 2.5 MG tablet Take 1 tablet by mouth daily 90 tablet 3    pantoprazole (PROTONIX) 40 MG tablet Take 1 tablet by mouth daily 90 tablet 3    ibuprofen (ADVIL;MOTRIN) 200 MG tablet Take 400 mg by mouth PRN headache      apixaban (ELIQUIS) 5 MG TABS tablet Take 1 tablet by mouth 2 times daily 60 tablet 11    dilTIAZem (CARDIZEM CD) 120 MG extended release capsule Take 1 capsule by mouth daily (Patient not taking: Reported on 7/14/2021) 30 capsule 0     No current facility-administered medications for this visit. Physical Exam  Constitutional:       Appearance: Normal appearance. He is not ill-appearing or diaphoretic. HENT:      Head: Normocephalic and atraumatic. Right Ear: External ear normal.      Left Ear: External ear normal.      Nose: Nose normal.      Mouth/Throat:      Mouth: Mucous membranes are moist.      Pharynx: Oropharynx is clear. Eyes:      General:         Right eye: No discharge. Left eye: No discharge. Pupils: Pupils are equal, round, and reactive to light. Neck:      Musculoskeletal: Normal range of motion and neck supple. Vascular: No carotid bruit. Cardiovascular:      Rate and Rhythm: Regular rate and rhythm (tachycardia)     Pulses: Normal pulses. Heart sounds:  No murmur. Pulmonary:      Effort: Pulmonary effort is normal.      Breath sounds: Normal breath sounds. No wheezing or rhonchi. Abdominal:      General: There is no distension. Palpations: Abdomen is soft. Tenderness: There is no abdominal tenderness. Musculoskeletal: Normal range of motion. Right lower leg: No edema. Left lower leg: No edema. Skin:     General: Skin is warm. Capillary Refill: Capillary refill takes less than 2 seconds. Neurological:      General: No focal deficit present. Mental Status: He is alert and oriented to person, place, and time. Psychiatric:         Mood and Affect: Mood normal.         Behavior: Behavior normal.       DATA:  Lab Results   Component Value Date    TROPONINT 0.010 (H) 03/07/2020     BNP:    Lab Results   Component Value Date    PROBNP 10,332 (H) 03/06/2020     PT/INR:  No results found for: PTINR  No results found for: LABA1C  Lab Results   Component Value Date    CHOL 150 03/10/2020    TRIG 83 03/10/2020    HDL 44 03/10/2020    LDLDIRECT 99 03/10/2020     Lab Results   Component Value Date    ALT 20 12/15/2020    AST 33 12/15/2020     TSH: No results found for: TSH      Impression and recommendations:     Atrial tachycardia  Status post ablation of atrial fibrillation  History of Tikosyn monitoring therapy  HTN    Patient is here with continued complaints of fatigue and tachycardia.   EKG obtained patient noted to be in atrial tachycardia versus atrial flutter with heart rate of 122  Discussed with Dr. Yates Ba  Will plan for cardioversion as soon as possible    Continue Toprol XL 25 mg twice daily  Patient did not start Cardizem as prescribed at last office visit due to fatigue      HF/LV dysfunction No (0)   HTN   Yes (1)  Age greater /equal 75 No (0)   DM No (0)   Stroke/TIA/TE No (0)   Vascular Disease No (0)   Age 74-69  Yes (1)  Sex Male  (0)     Chads score of 2  Continue anticoagulation    We will continue anticoagulation-Eliquis 5 mg twice daily    Vitals:    07/14/21 1432   BP: 108/60   Pulse: 121     Blood pressure is stable-continue lisinopril 2.5 mg daily and Toprol-XL 25 mg twice daily    Patient is no longer drinking alcohol  He voiced understanding     ROSALINO Maldonado CNP on 7/14/2021 at 2:51 PM

## 2021-07-14 NOTE — ANESTHESIA PRE PROCEDURE
Department of Anesthesiology  Preprocedure Note       Name:  Sunil Griffith   Age:  66 y.o.  :  1950                                          MRN:  [id number]         Date:  2021      Surgeon: * Surgery not found *    Procedure:     Medications prior to admission:   Prior to Admission medications    Medication Sig Start Date End Date Taking? Authorizing Provider   dilTIAZem (CARDIZEM CD) 120 MG extended release capsule Take 1 capsule by mouth daily  Patient not taking: Reported on 2021   [place] RollROSALINO - CNP   metoprolol succinate (TOPROL XL) 25 MG extended release tablet Take 1 tablet by mouth 2 times daily 21   [place] Roll, ROSALINO - CNP   lisinopril (PRINIVIL;ZESTRIL) 2.5 MG tablet Take 1 tablet by mouth daily 3/29/21   ROSALINO Hillman - CNP   pantoprazole (PROTONIX) 40 MG tablet Take 1 tablet by mouth daily 3/29/21   ROSALINO Hillman CNP   ibuprofen (ADVIL;MOTRIN) 200 MG tablet Take 400 mg by mouth PRN headache    Historical Provider, MD   apixaban (ELIQUIS) 5 MG TABS tablet Take 1 tablet by mouth 2 times daily 21   ROSALINO Hillman CNP       Current medications:    Current Outpatient Medications   Medication Sig Dispense Refill    dilTIAZem (CARDIZEM CD) 120 MG extended release capsule Take 1 capsule by mouth daily (Patient not taking: Reported on 2021) 30 capsule 0    metoprolol succinate (TOPROL XL) 25 MG extended release tablet Take 1 tablet by mouth 2 times daily 60 tablet 3    lisinopril (PRINIVIL;ZESTRIL) 2.5 MG tablet Take 1 tablet by mouth daily 90 tablet 3    pantoprazole (PROTONIX) 40 MG tablet Take 1 tablet by mouth daily 90 tablet 3    ibuprofen (ADVIL;MOTRIN) 200 MG tablet Take 400 mg by mouth PRN headache      apixaban (ELIQUIS) 5 MG TABS tablet Take 1 tablet by mouth 2 times daily 60 tablet 11     No current facility-administered medications for this encounter.        Allergies:  No Known Allergies    Problem List: Patient Active Problem List   Diagnosis Code    Atrial fibrillation with RVR ([facility]) I48.91    Chronic combined systolic and diastolic congestive heart failure ([facility]) I50.42    Essential hypertension I10    Thrombus of left atrial appendage I51.3    Nonischemic cardiomyopathy (HCC) I42.8    Persistent atrial fibrillation (HCC) I48.19    Paroxysmal atrial fibrillation ([facility]) I48.0    BPH with urinary obstruction N40.1, N13.8    S/P TURP Z90.79    Visit for monitoring Tikosyn therapy Z51.81, Z79.899    S/P ablation of atrial fibrillation Z98.890, Z86.79    Atrial tachycardia ([facility]) I47.1       Past Medical History:        Diagnosis Date    Atrial fibrillation ([facility] [address]     follows with Dr Raghav Harris CHF (congestive heart failure) ([facility] [address] 03/2020    Enlarged prostate     H/O echocardiogram 05/21/2020    Mild prolapse anterior mitral valve leaflet,EF20%,no thrombus    H/O percutaneous left heart catheterization     [place] (hard of hearing)     no hearing aides    Hypotension     Indwelling catheter present on admission     \"had catheter in since March 2020\"    Iron deficiency anemia     PONV (postoperative nausea and vomiting)     \"sick in 1981 but did fine after that with other surgeries\"    S/P ablation of atrial fibrillation 12/14/2020    atrial fibrillation ablation with Dr. Giovanna Jennings.     UTI (urinary tract infection)     week 7/20/2020- was on antibiotic- had repeat urine test 7/27/2020 but never heard the results\"    Wears glasses        Past Surgical History:        Procedure Laterality Date    ARM SURGERY Left 1981    \"have plate and screws still in place\"   [address]      per old chart had cath 3/2020    CARDIOVERSION  05/2020    \"had cardioversion- worked for some time but the atrial fib is back\"    COLONOSCOPY  2010    EYE SURGERY  2015    left eye cataract ext     TURP N/A 8/6/2020    CYSTOSCOPY W/BIPOLAR TURP REMOVAL OF BLADDER STONES performed by Nallely Arora MD at [place] [address] Social History:    Social History     Tobacco Use    Smoking status: Never Smoker    Smokeless tobacco: Never Used   Substance Use Topics    Alcohol use: Not on file     Comment: \"drink 1-2 beers per day\"                                Counseling given: Not Answered      Vital Signs (Current): There were no vitals filed for this visit. BP Readings from Last 3 Encounters:   07/14/21 108/60   06/30/21 104/64   05/04/21 126/82       NPO Status:                                                                                 BMI:   Wt Readings from Last 3 Encounters:   07/14/21 189 lb (85.7 kg)   06/30/21 187 lb (84.8 kg)   05/04/21 190 lb (86.2 kg)     There is no height or weight on file to calculate BMI.    CBC:   Lab Results   Component Value Date    WBC 8.9 12/10/2020    RBC 4.56 12/10/2020    HGB 12.6 12/14/2020    HCT 37.0 12/14/2020    MCV 91.2 12/10/2020    RDW 14.2 12/10/2020     12/10/2020       CMP:   Lab Results   Component Value Date     12/15/2020    K 4.7 12/15/2020     12/15/2020    CO2 22 12/15/2020    BUN 16 12/15/2020    CREATININE 1.0 12/15/2020    GFRAA >60 12/15/2020    LABGLOM >60 12/15/2020    GLUCOSE 118 12/15/2020    PROT 5.7 12/15/2020    CALCIUM 8.1 12/15/2020    BILITOT 0.6 12/15/2020    ALKPHOS 41 12/15/2020    AST 33 12/15/2020    ALT 20 12/15/2020       POC Tests: No results for input(s): POCGLU, POCNA, POCK, POCCL, POCBUN, POCHEMO, POCHCT in the last 72 hours.     Coags:   Lab Results   Component Value Date    PROTIME 11.6 12/10/2020    INR 0.96 12/10/2020    APTT 28.2 12/10/2020       HCG (If Applicable): No results found for: PREGTESTUR, PREGSERUM, HCG, HCGQUANT     ABGs:   Lab Results   Component Value Date    PO2ART 313.6 12/14/2020    OQQ4XFQ 36.1 12/14/2020    YBR9JHC 23.6 12/14/2020        Type & Screen (If Applicable):  No results found for: LABABO, LABRH    Drug/Infectious Status (If Applicable):  No results found for: HIV, HEPCAB    COVID-19 Screening (If Applicable):   Lab Results   Component Value Date    COVID19 NOT DETECTED 12/10/2020           Anesthesia Evaluation  Patient summary reviewed   history of anesthetic complications: PONV. Airway: Mallampati: II  TM distance: >3 FB   Neck ROM: full  Mouth opening: > = 3 FB Dental:          Pulmonary:Negative Pulmonary ROS                              Cardiovascular:  Exercise tolerance: good (>4 METS),   (+) hypertension:, dysrhythmias: atrial fibrillation, CHF: systolic and diastolic,          Beta Blocker:  Dose within 24 Hrs      ROS comment: Echo 12/2020:  Summary   This is a limited study s/p ablation to r/o pericardial effusion. Left ventricular systolic function is normal LVEF 50%   Mild to moderate mitral regurgitation. There is a trivial pericardial effusion. 3/2020 Diagnostic procedure:Right Coronary Angiography, Left Coronary   Angiography      Conclusions      Procedure Summary   NO CAD   [address] CARDIOMYOPATHY     Neuro/Psych:   Negative Neuro/Psych ROS              GI/Hepatic/Renal: Neg GI/Hepatic/Renal ROS            Endo/Other:    (+) blood dyscrasia: anticoagulation therapy and anemia:., .                 Abdominal:             Vascular: negative vascular ROS. Other Findings:           Anesthesia Plan      MAC and general     ASA 3       Induction: intravenous. Anesthetic plan and risks discussed with patient. ROSALINO Saavedra CRNA   7/14/2021         Pre Anesthesia Assessment complete. Chart reviewed on 7/14/2021     Pre Anesthesia Evaluation complete. Anesthesia plan, risks, benefits, alternatives, and personnel discussed with patient and/or legal guardian. Patient and/or legal guardian verbalized an understanding and agreed to proceed. Anesthesia plan discussed with care team members and agreed upon.   ROSALINO Muñoz CRNA  7/15/2021

## 2021-07-15 ENCOUNTER — ANESTHESIA (OUTPATIENT)
Dept: CARDIAC CATH/INVASIVE PROCEDURES | Age: 71
End: 2021-07-15
Payer: MEDICARE

## 2021-07-15 ENCOUNTER — HOSPITAL ENCOUNTER (OUTPATIENT)
Dept: CARDIAC CATH/INVASIVE PROCEDURES | Age: 71
Discharge: HOME | End: 2021-07-15
Attending: INTERNAL MEDICINE | Admitting: INTERNAL MEDICINE
Payer: MEDICARE

## 2021-07-15 VITALS
DIASTOLIC BLOOD PRESSURE: 80 MMHG | RESPIRATION RATE: 2 BRPM | OXYGEN SATURATION: 100 % | SYSTOLIC BLOOD PRESSURE: 120 MMHG

## 2021-07-15 VITALS
SYSTOLIC BLOOD PRESSURE: 127 MMHG | TEMPERATURE: 97.4 F | WEIGHT: 187 LBS | HEIGHT: 76 IN | OXYGEN SATURATION: 100 % | BODY MASS INDEX: 22.77 KG/M2 | HEART RATE: 68 BPM | DIASTOLIC BLOOD PRESSURE: 87 MMHG | RESPIRATION RATE: 16 BRPM

## 2021-07-15 LAB
ANION GAP SERPL CALCULATED.3IONS-SCNC: 8 MMOL/L (ref 4–16)
APTT: 35.9 SECONDS (ref 25.1–37.1)
BASOPHILS ABSOLUTE: 0 K/CU MM
BASOPHILS RELATIVE PERCENT: 0.1 % (ref 0–1)
BUN BLDV-MCNC: 20 MG/DL (ref 6–23)
CALCIUM SERPL-MCNC: 8.8 MG/DL (ref 8.3–10.6)
CHLORIDE BLD-SCNC: 99 MMOL/L (ref 99–110)
CO2: 25 MMOL/L (ref 21–32)
CREAT SERPL-MCNC: 1.2 MG/DL (ref 0.9–1.3)
DIFFERENTIAL TYPE: ABNORMAL
EKG ATRIAL RATE: 114 BPM
EKG ATRIAL RATE: 67 BPM
EKG DIAGNOSIS: NORMAL
EKG DIAGNOSIS: NORMAL
EKG P AXIS: 237 DEGREES
EKG P AXIS: 71 DEGREES
EKG P-R INTERVAL: 120 MS
EKG P-R INTERVAL: 246 MS
EKG Q-T INTERVAL: 332 MS
EKG Q-T INTERVAL: 420 MS
EKG QRS DURATION: 84 MS
EKG QRS DURATION: 90 MS
EKG QTC CALCULATION (BAZETT): 443 MS
EKG QTC CALCULATION (BAZETT): 457 MS
EKG R AXIS: 26 DEGREES
EKG R AXIS: 28 DEGREES
EKG T AXIS: 48 DEGREES
EKG T AXIS: 59 DEGREES
EKG VENTRICULAR RATE: 114 BPM
EKG VENTRICULAR RATE: 67 BPM
EOSINOPHILS ABSOLUTE: 0 K/CU MM
EOSINOPHILS RELATIVE PERCENT: 0.6 % (ref 0–3)
GFR AFRICAN AMERICAN: >60 ML/MIN/1.73M2
GFR NON-AFRICAN AMERICAN: 60 ML/MIN/1.73M2
GLUCOSE BLD-MCNC: 86 MG/DL (ref 70–99)
HCT VFR BLD CALC: 39.3 % (ref 42–52)
HEMOGLOBIN: 13.3 GM/DL (ref 13.5–18)
IMMATURE NEUTROPHIL %: 0.3 % (ref 0–0.43)
INR BLD: 1.27 INDEX
LYMPHOCYTES ABSOLUTE: 2.4 K/CU MM
LYMPHOCYTES RELATIVE PERCENT: 34.1 % (ref 24–44)
MAGNESIUM: 1.9 MG/DL (ref 1.8–2.4)
MCH RBC QN AUTO: 31.3 PG (ref 27–31)
MCHC RBC AUTO-ENTMCNC: 33.8 % (ref 32–36)
MCV RBC AUTO: 92.5 FL (ref 78–100)
MONOCYTES ABSOLUTE: 0.5 K/CU MM
MONOCYTES RELATIVE PERCENT: 7 % (ref 0–4)
NUCLEATED RBC %: 0 %
PDW BLD-RTO: 13.8 % (ref 11.7–14.9)
PHOSPHORUS: 3.6 MG/DL (ref 2.5–4.9)
PLATELET # BLD: 177 K/CU MM (ref 140–440)
PMV BLD AUTO: 10.3 FL (ref 7.5–11.1)
POTASSIUM SERPL-SCNC: 4.2 MMOL/L (ref 3.5–5.1)
PROTHROMBIN TIME: 16.4 SECONDS (ref 11.7–14.5)
RBC # BLD: 4.25 M/CU MM (ref 4.6–6.2)
SARS-COV-2, NAAT: NOT DETECTED
SEGMENTED NEUTROPHILS ABSOLUTE COUNT: 4.1 K/CU MM
SEGMENTED NEUTROPHILS RELATIVE PERCENT: 57.9 % (ref 36–66)
SODIUM BLD-SCNC: 132 MMOL/L (ref 135–145)
SOURCE: NORMAL
TOTAL IMMATURE NEUTOROPHIL: 0.02 K/CU MM
TOTAL NUCLEATED RBC: 0 K/CU MM
WBC # BLD: 7 K/CU MM (ref 4–10.5)

## 2021-07-15 PROCEDURE — 3700000001 HC ADD 15 MINUTES (ANESTHESIA)

## 2021-07-15 PROCEDURE — 2500000003 HC RX 250 WO HCPCS: Performed by: NURSE ANESTHETIST, CERTIFIED REGISTERED

## 2021-07-15 PROCEDURE — 2580000003 HC RX 258: Performed by: NURSE ANESTHETIST, CERTIFIED REGISTERED

## 2021-07-15 PROCEDURE — 3700000000 HC ANESTHESIA ATTENDED CARE

## 2021-07-15 PROCEDURE — 92960 CARDIOVERSION ELECTRIC EXT: CPT

## 2021-07-15 PROCEDURE — 93325 DOPPLER ECHO COLOR FLOW MAPG: CPT | Performed by: INTERNAL MEDICINE

## 2021-07-15 PROCEDURE — 6360000002 HC RX W HCPCS

## 2021-07-15 PROCEDURE — 93312 ECHO TRANSESOPHAGEAL: CPT

## 2021-07-15 PROCEDURE — 83735 ASSAY OF MAGNESIUM: CPT

## 2021-07-15 PROCEDURE — 85730 THROMBOPLASTIN TIME PARTIAL: CPT

## 2021-07-15 PROCEDURE — 92960 CARDIOVERSION ELECTRIC EXT: CPT | Performed by: INTERNAL MEDICINE

## 2021-07-15 PROCEDURE — 93312 ECHO TRANSESOPHAGEAL: CPT | Performed by: INTERNAL MEDICINE

## 2021-07-15 PROCEDURE — 85025 COMPLETE CBC W/AUTO DIFF WBC: CPT

## 2021-07-15 PROCEDURE — 85610 PROTHROMBIN TIME: CPT

## 2021-07-15 PROCEDURE — 84100 ASSAY OF PHOSPHORUS: CPT

## 2021-07-15 PROCEDURE — 6360000002 HC RX W HCPCS: Performed by: NURSE ANESTHETIST, CERTIFIED REGISTERED

## 2021-07-15 PROCEDURE — 93010 ELECTROCARDIOGRAM REPORT: CPT | Performed by: INTERNAL MEDICINE

## 2021-07-15 PROCEDURE — 93005 ELECTROCARDIOGRAM TRACING: CPT | Performed by: INTERNAL MEDICINE

## 2021-07-15 PROCEDURE — 2709999900 HC NON-CHARGEABLE SUPPLY

## 2021-07-15 PROCEDURE — 80048 BASIC METABOLIC PNL TOTAL CA: CPT

## 2021-07-15 PROCEDURE — 87635 SARS-COV-2 COVID-19 AMP PRB: CPT

## 2021-07-15 RX ORDER — LIDOCAINE HYDROCHLORIDE 20 MG/ML
INJECTION, SOLUTION EPIDURAL; INFILTRATION; INTRACAUDAL; PERINEURAL PRN
Status: DISCONTINUED | OUTPATIENT
Start: 2021-07-15 | End: 2021-07-15 | Stop reason: SDUPTHER

## 2021-07-15 RX ORDER — PROPOFOL 10 MG/ML
INJECTION, EMULSION INTRAVENOUS PRN
Status: DISCONTINUED | OUTPATIENT
Start: 2021-07-15 | End: 2021-07-15 | Stop reason: SDUPTHER

## 2021-07-15 RX ORDER — SODIUM CHLORIDE, SODIUM LACTATE, POTASSIUM CHLORIDE, CALCIUM CHLORIDE 600; 310; 30; 20 MG/100ML; MG/100ML; MG/100ML; MG/100ML
INJECTION, SOLUTION INTRAVENOUS CONTINUOUS PRN
Status: DISCONTINUED | OUTPATIENT
Start: 2021-07-15 | End: 2021-07-15 | Stop reason: SDUPTHER

## 2021-07-15 RX ADMIN — LIDOCAINE HYDROCHLORIDE 50 MG: 20 INJECTION, SOLUTION EPIDURAL; INFILTRATION; INTRACAUDAL; PERINEURAL at 11:18

## 2021-07-15 RX ADMIN — SODIUM CHLORIDE, POTASSIUM CHLORIDE, SODIUM LACTATE AND CALCIUM CHLORIDE: 600; 310; 30; 20 INJECTION, SOLUTION INTRAVENOUS at 11:11

## 2021-07-15 RX ADMIN — LIDOCAINE HYDROCHLORIDE 50 MG: 20 INJECTION, SOLUTION EPIDURAL; INFILTRATION; INTRACAUDAL; PERINEURAL at 11:17

## 2021-07-15 RX ADMIN — PROPOFOL 80 MG: 10 INJECTION, EMULSION INTRAVENOUS at 11:17

## 2021-07-15 RX ADMIN — PROPOFOL 20 MG: 10 INJECTION, EMULSION INTRAVENOUS at 11:21

## 2021-07-15 ASSESSMENT — PULMONARY FUNCTION TESTS
PIF_VALUE: 1
PIF_VALUE: 1
PIF_VALUE: 2
PIF_VALUE: 8
PIF_VALUE: 2
PIF_VALUE: 0
PIF_VALUE: 5
PIF_VALUE: 1
PIF_VALUE: 0
PIF_VALUE: 1
PIF_VALUE: 1
PIF_VALUE: 2
PIF_VALUE: 1
PIF_VALUE: 1
PIF_VALUE: 0
PIF_VALUE: 1
PIF_VALUE: 2
PIF_VALUE: 0
PIF_VALUE: 1
PIF_VALUE: 0
PIF_VALUE: 1
PIF_VALUE: 0

## 2021-07-15 NOTE — PROGRESS NOTES
Arrival to cath lab holding area room 11. Assessment completed, Baseline vitals done and questions answered. Labs drawn as ordered and sent to lab. IV started, # 20 g Right FA site, saline lock. 12 lead EKG done .

## 2021-07-15 NOTE — PROCEDURES
[facility]     Electrophysiology Procedure Note       Date of Procedure: 7/15/2021  Patient's Name: Donald Mcqueen  YOB: 1950   Medical Record Number: [id number]    Procedure Performed by: Naman Oliva MD    Sedation: Anesthesia    Procedures performed:    Trans-esophageal echocardiography  External Electrical cardioversion    Indication of the procedure: Atypical atrial flutter    Donald Mcqueen is a 66 y.o. male with symptomatic atrial flutter. Details of procedure: The patient was brought to the cath lab area in a fasting and non-sedated state. The risks, benefits and alternatives of the procedure were discussed with the patient. The patient opted to proceed with the procedure. Written informed consent was signed and placed in the chart. A timeout protocol was completed to identify the patient and the procedure being performed. Sedation per anesthesia and SEEMA was performed which did not show any ALEJANDRA/LA clot/thrombus. Full SEEMA reports will be dictated. After confirming sedation and electrical DC cardioversion was perfomred using 125 J, synchronized shock. Patient was converted to sinus rhythm. The patient tolerated the procedure well and there were no complications. Conclusion:   Successful external DC cardioversion of atrial flutter    Plan:   The patient can be discharged if remains stable. Will continue with medical therapy.

## 2021-07-15 NOTE — H&P
Electrophysiology H&p    Reason for consultation: fluttering      Chief complaint: fatigue, palpitations    Referring physician: Dr. Juancho Mccarthy     Primary care physician: Rosas Bailon MD     History of Present Illness: Today visit (07/15/21)    Patient here for Cardioversion. No change in H&P noted from previous clinic visit. Previous visit 7/14/2021  Patient is here today for follow-up on tachycardia with associated fatigue. Patient was seen 2 weeks ago and was started on Cardizem. Patient reports that he did not start the Cardizem because he was feeling fatigued. He states that he continues to have fatigue and palpitations that are intermittent in nature. He states the dizziness has resolved. He reports he will have these symptoms both at rest and with ambulation. He reports that symptoms are unchanged from previous office visit. He reports that he has stopped drinking 1 beer a day. He is taking his medications as prescribed. Previous visit 6/30/2021  Patient is here today with complaints of dizziness, fatigue, palpitations. He reports the symptoms started about 2 weeks ago. He states they are intermittent in nature. He reports he will have these symptoms both at rest and with ambulation. He denies alleviating factors. He states that he will wake up in the middle of the night having palpitations. He states that it is a pounding sensation that lasts 5 to 15 minutes. He reports this is happening every night. He will have associated chest tightness shortness of breath and lightheadedness. Patient is taking his medications as prescribed. He states that he recently started drinking 1 beer a day. Previous visit 5/4/2021  Patient here for 4-month follow-up status post ablation of atrial fibrillation. He reports that he is feeling well. He denies episodes of palpitations or tachycardia. He additionally denies chest pain, lightheadedness, dizziness, shortness of breath edema or syncope.   He  [place] (hard of hearing)     no hearing aides    Hypotension     Indwelling catheter present on admission     \"had catheter in since March 2020\"    Iron deficiency anemia     PONV (postoperative nausea and vomiting)     \"sick in 1981 but did fine after that with other surgeries\"    S/P ablation of atrial fibrillation 12/14/2020    atrial fibrillation ablation with Dr. Giovanna Jennings.  UTI (urinary tract infection)     week 7/20/2020- was on antibiotic- had repeat urine test 7/27/2020 but never heard the results\"    Wears glasses      Past Surgical History:   Procedure Laterality Date    ARM SURGERY Left 1981    \"have plate and screws still in place\"   [address]      per old chart had cath 3/2020    CARDIOVERSION  05/2020    \"had cardioversion- worked for some time but the atrial fib is back\"    COLONOSCOPY  2010    EYE SURGERY  2015    left eye cataract ext     TURP N/A 8/6/2020    CYSTOSCOPY W/BIPOLAR TURP REMOVAL OF BLADDER STONES performed by Nallely Arora MD at [address] History   Problem Relation Age of Onset    Cancer Mother         thyroid cancer    Cancer Father         lymphoma     Social History     Tobacco Use    Smoking status: Never Smoker    Smokeless tobacco: Never Used   Substance Use Topics    Alcohol use: Not on file     Comment: \"drink 1-2 beers per day\"        Review of Systems   Constitution: Negative for diaphoresis and malaise/positive for fatigue  HENT: Negative for congestion, hoarse voice and tinnitus. Eyes: Negative for pain and visual disturbance. Cardiovascular: Negative for chest pain, positive dyspnea on exertion, positive irregular heartbeat, negative leg swelling, negative near-syncope, positive for palpitation  Respiratory: Negative for cough and shortness of breath. Endocrine: Negative for cold intolerance and heat intolerance. Hematologic/Lymphatic: Negative for adenopathy and bleeding problem.    Skin: Negative for color change and poor wound healing. Musculoskeletal: Positive for arthritis. Negative for muscle weakness. Gastrointestinal: Negative for abdominal pain and melena. Genitourinary: Negative for flank pain and hematuria. Neurological: Positive for dizziness and light-headedness. Psychiatric/Behavioral: Negative for depression. The patient is not nervous/anxious. BP (!) 134/102   Pulse 115   Temp 97.4 °F (36.3 °C) (Tympanic)   Resp 20   Ht 6' 4\" (1.93 m)   Wt 187 lb (84.8 kg)   SpO2 100%   BMI 22.76 kg/m²   Wt Readings from Last 3 Encounters:   07/15/21 187 lb (84.8 kg)   07/14/21 189 lb (85.7 kg)   06/30/21 187 lb (84.8 kg)       No current facility-administered medications for this encounter. Physical Exam  Constitutional:       Appearance: Normal appearance. He is not ill-appearing or diaphoretic. HENT:      Head: Normocephalic and atraumatic. Right Ear: External ear normal.      Left Ear: External ear normal.      Nose: Nose normal.      Mouth/Throat:      Mouth: Mucous membranes are moist.      Pharynx: Oropharynx is clear. Eyes:      General:         Right eye: No discharge. Left eye: No discharge. Pupils: Pupils are equal, round, and reactive to light. Neck:      Musculoskeletal: Normal range of motion and neck supple. Vascular: No carotid bruit. Cardiovascular:      Rate and Rhythm: Regular rate and rhythm (tachycardia)     Pulses: Normal pulses. Heart sounds: No murmur. Pulmonary:      Effort: Pulmonary effort is normal.      Breath sounds: Normal breath sounds. No wheezing or rhonchi. Abdominal:      General: There is no distension. Palpations: Abdomen is soft. Tenderness: There is no abdominal tenderness. Musculoskeletal: Normal range of motion. Right lower leg: No edema. Left lower leg: No edema. Skin:     General: Skin is warm. Capillary Refill: Capillary refill takes less than 2 seconds.    Neurological:      General: No

## 2021-07-15 NOTE — FLOWSHEET NOTE
IV Dc'd with insyte intact. Covered with a DSD. Discharge instructions reviewed and understanding verbalized. Patient called his ride for . No c/o or needs verbalized.

## 2021-07-15 NOTE — FLOWSHEET NOTE
Patient dressed and ready for discharge. Requesting to go sit outside and wait on his ride. Out to the entrance by wheelchair. Family to . No c/o or needs verbalized.

## 2021-07-15 NOTE — ANESTHESIA POSTPROCEDURE EVALUATION
Department of Anesthesiology  Postprocedure Note    Patient: Nahed Negrete  MRN: [id number]  YOB: 1950  Date of evaluation: 7/15/2021  Time:  11:39 AM     Procedure Summary     Date: 07/15/21 Room / Location: [facility] Cath Lab    Anesthesia Start: 1109 Anesthesia Stop: 8197    Procedure: CL CARDIOVERSION Diagnosis: Atrial fibrillation ([address]    Scheduled Providers:  Responsible Provider: Lakshmi Sherman DO    Anesthesia Type: MAC, general ASA Status: 3          Anesthesia Type: MAC, general    Jose Antonio Phase I:      Jose Antonio Phase II:      Last vitals: Reviewed and per EMR flowsheets.        Anesthesia Post Evaluation    Patient location during evaluation: bedside  Patient participation: complete - patient participated  Level of consciousness: sleepy but conscious  Pain score: 0  Airway patency: patent  Nausea & Vomiting: no nausea and no vomiting  Complications: no  Cardiovascular status: blood pressure returned to baseline and hemodynamically stable  Respiratory status: acceptable, room air and spontaneous ventilation  Hydration status: euvolemic

## 2021-07-15 NOTE — PROCEDURES
Jessica Julien   66 y.o., male  1950      7/15/2021    Attending: Fatemeh Szymanski MD    Sedation : Anesthesia                        Indication:   Atrial tachycardia vs flutter - atypical                                                                                                                                                    After obtaining the informed cosent. Pt brought to EP lab. Sedation per anesthesia . After proper sedation SEEMA performed. US Doppler was used to assess abnormalities where appropriate. Post procedure pt is stable.       Findings:  LV= appears reduced LVEF -LVEF was not accurately assessed   LA=  dilated  ALEJANDRA= NO CLOTS  RV= normal  RA=  normal  IAS= Normal  Bubble study=not done for PFO or ASD  AV=tricuspid, trivial regurgitation and no stenosis  MV=  Multiple jets, atleast moderate regurgitation, no stenosis  TV=mild regurgitation  PV= no significant regurgitation,   Aorta=mild stable plaque noted  PUL ADITI FLOW=systolic blunting noted    Impression:  No ALEJANDRA clot    Plan:  Proceed with cardioversion

## 2021-07-19 ENCOUNTER — TELEPHONE (OUTPATIENT)
Dept: CARDIOLOGY CLINIC | Age: 71
End: 2021-07-19

## 2021-07-19 NOTE — TELEPHONE ENCOUNTER
Attempted to reach patient to discuss prostate surgery. Phone does not ring and there is no VM set up. Will attempt to reach patient at a later time.

## 2021-07-19 NOTE — TELEPHONE ENCOUNTER
Patient called for a follow up from his cardioversion  He is to have prostate surgery and would like seen  Prior to that to make sure he is cleared for surgery  Rico Basurto not available

## 2021-07-20 NOTE — TELEPHONE ENCOUNTER
Attempted to reach patient to discuss prostate surgery. Phone only rings once and no VM set up. Will attempt to reach patient at a later time.

## 2021-07-23 ENCOUNTER — TELEPHONE (OUTPATIENT)
Dept: CARDIOLOGY CLINIC | Age: 71
End: 2021-07-23

## 2021-07-23 NOTE — TELEPHONE ENCOUNTER
Krzysztof Ortega called from Urology stating that patient had a cardioversion, she wants to know if patient needs to cancel surgery. Please call her back no later than 3 today at ph# [phone].

## 2021-07-23 NOTE — TELEPHONE ENCOUNTER
Spoke with Doug. Advised her that this office has tried to reach the patient multiple times this week to discuss prostate surgery. Doug states she has issues contacting the patient also. Advised that per Jing Kaplan, unless urology procedure is urgent/emergent, patient should wait 4 week from the time of cardioversion before holding Eliquis. Diana voiced understanding. She will contact the patient to reschedule TURP procedure.

## 2021-08-17 ENCOUNTER — OFFICE VISIT (OUTPATIENT)
Dept: CARDIOLOGY CLINIC | Age: 71
End: 2021-08-17
Payer: MEDICARE

## 2021-08-17 VITALS
SYSTOLIC BLOOD PRESSURE: 110 MMHG | HEART RATE: 70 BPM | BODY MASS INDEX: 22.63 KG/M2 | WEIGHT: 185.8 LBS | HEIGHT: 76 IN | DIASTOLIC BLOOD PRESSURE: 68 MMHG

## 2021-08-17 DIAGNOSIS — I47.1 ATRIAL TACHYCARDIA (HCC): Primary | ICD-10-CM

## 2021-08-17 DIAGNOSIS — Z98.890 S/P ABLATION OF ATRIAL FIBRILLATION: ICD-10-CM

## 2021-08-17 DIAGNOSIS — Z86.79 S/P ABLATION OF ATRIAL FIBRILLATION: ICD-10-CM

## 2021-08-17 DIAGNOSIS — I10 ESSENTIAL HYPERTENSION: ICD-10-CM

## 2021-08-17 PROCEDURE — 3017F COLORECTAL CA SCREEN DOC REV: CPT | Performed by: NURSE PRACTITIONER

## 2021-08-17 PROCEDURE — 99214 OFFICE O/P EST MOD 30 MIN: CPT | Performed by: NURSE PRACTITIONER

## 2021-08-17 PROCEDURE — 1123F ACP DISCUSS/DSCN MKR DOCD: CPT | Performed by: NURSE PRACTITIONER

## 2021-08-17 PROCEDURE — 4040F PNEUMOC VAC/ADMIN/RCVD: CPT | Performed by: NURSE PRACTITIONER

## 2021-08-17 PROCEDURE — G8420 CALC BMI NORM PARAMETERS: HCPCS | Performed by: NURSE PRACTITIONER

## 2021-08-17 PROCEDURE — G8427 DOCREV CUR MEDS BY ELIG CLIN: HCPCS | Performed by: NURSE PRACTITIONER

## 2021-08-17 PROCEDURE — 1036F TOBACCO NON-USER: CPT | Performed by: NURSE PRACTITIONER

## 2021-08-17 PROCEDURE — 93000 ELECTROCARDIOGRAM COMPLETE: CPT | Performed by: NURSE PRACTITIONER

## 2021-08-17 NOTE — PROGRESS NOTES
daily. Past Medical History:   Diagnosis Date    Atrial fibrillation [facility])     follows with Dr Sonya Elmore CHF (congestive heart failure) ([address] 03/2020    Enlarged prostate     H/O echocardiogram 05/21/2020    Mild prolapse anterior mitral valve leaflet,EF20%,no thrombus    H/O percutaneous left heart catheterization     [place] (hard of hearing)     no hearing aides    Hypotension     Indwelling catheter present on admission     \"had catheter in since March 2020\"    Iron deficiency anemia     PONV (postoperative nausea and vomiting)     \"sick in 1981 but did fine after that with other surgeries\"    S/P ablation of atrial fibrillation 12/14/2020    atrial fibrillation ablation with Dr. Mrav Bee.  UTI (urinary tract infection)     week 7/20/2020- was on antibiotic- had repeat urine test 7/27/2020 but never heard the results\"    Wears glasses      Past Surgical History:   Procedure Laterality Date    ARM SURGERY Left 1981    \"have plate and screws still in place\"   [address]      per old chart had cath 3/2020    CARDIOVERSION  05/2020    \"had cardioversion- worked for some time but the atrial fib is back\"    CARDIOVERSION N/A 07/15/2021    Successful SEEMA/CV performed by Dr. Marv Bee.  COLONOSCOPY  2010    EYE SURGERY  2015    left eye cataract ext     TURP N/A 08/06/2020    CYSTOSCOPY W/BIPOLAR TURP REMOVAL OF BLADDER STONES performed by Giselle Vega MD at [address] History   Problem Relation Age of Onset   [place] Cancer Mother         thyroid cancer    Cancer Father         lymphoma     Social History     Tobacco Use    Smoking status: Never Smoker    Smokeless tobacco: Never Used   Substance Use Topics    Alcohol use: Not on file     Comment: \"drink 1-2 beers per day\"        Review of Systems   Constitution: Negative for diaphoresis and malaise/positive for fatigue  HENT: Negative for congestion, hoarse voice and tinnitus. Eyes: Negative for pain and visual disturbance. Cardiovascular: Negative for chest pain, positive dyspnea on exertion, positive irregular heartbeat, negative leg swelling, negative near-syncope, positive for palpitation  Respiratory: Negative for cough and shortness of breath. Endocrine: Negative for cold intolerance and heat intolerance. Hematologic/Lymphatic: Negative for adenopathy and bleeding problem. Skin: Negative for color change and poor wound healing. Musculoskeletal: Positive for arthritis. Negative for muscle weakness. Gastrointestinal: Negative for abdominal pain and melena. Genitourinary: Negative for flank pain and hematuria. Neurological: Positive for dizziness and light-headedness. Psychiatric/Behavioral: Negative for depression. The patient is not nervous/anxious. /68   Pulse 70   Ht 6' 4\" (1.93 m)   Wt 185 lb 12.8 oz (84.3 kg)   BMI 22.62 kg/m²   Wt Readings from Last 3 Encounters:   08/17/21 185 lb 12.8 oz (84.3 kg)   07/15/21 187 lb (84.8 kg)   07/14/21 189 lb (85.7 kg)       Current Outpatient Medications   Medication Sig Dispense Refill    dilTIAZem (CARDIZEM CD) 120 MG extended release capsule Take 1 capsule by mouth daily 30 capsule 0    metoprolol succinate (TOPROL XL) 25 MG extended release tablet Take 1 tablet by mouth 2 times daily 60 tablet 3    lisinopril (PRINIVIL;ZESTRIL) 2.5 MG tablet Take 1 tablet by mouth daily 90 tablet 3    pantoprazole (PROTONIX) 40 MG tablet Take 1 tablet by mouth daily 90 tablet 3    ibuprofen (ADVIL;MOTRIN) 200 MG tablet Take 400 mg by mouth PRN headache      apixaban (ELIQUIS) 5 MG TABS tablet Take 1 tablet by mouth 2 times daily 60 tablet 11     No current facility-administered medications for this visit. Physical Exam  Constitutional:       Appearance: Normal appearance. He is not ill-appearing or diaphoretic. HENT:      Head: Normocephalic and atraumatic.       Right Ear: External ear normal.      Left Ear: External ear normal.      Nose: Nose normal. Mouth/Throat:      Mouth: Mucous membranes are moist.      Pharynx: Oropharynx is clear. Eyes:      General:         Right eye: No discharge. Left eye: No discharge. Pupils: Pupils are equal, round, and reactive to light. Neck:      Musculoskeletal: Normal range of motion and neck supple. Vascular: No carotid bruit. Cardiovascular:      Rate and Rhythm: Regular rate and rhythm     Pulses: Normal pulses. Heart sounds: No murmur. Pulmonary:      Effort: Pulmonary effort is normal.      Breath sounds: Normal breath sounds. No wheezing or rhonchi. Abdominal:      General: There is no distension. Palpations: Abdomen is soft. Tenderness: There is no abdominal tenderness. Musculoskeletal: Normal range of motion. Right lower leg: No edema. Left lower leg: No edema. Skin:     General: Skin is warm. Capillary Refill: Capillary refill takes less than 2 seconds. Neurological:      General: No focal deficit present. Mental Status: He is alert and oriented to person, place, and time. Psychiatric:         Mood and Affect: Mood normal.         Behavior: Behavior normal.       DATA:  Lab Results   Component Value Date    TROPONINT 0.010 (H) 03/07/2020     BNP:    Lab Results   Component Value Date    PROBNP 10,332 (H) 03/06/2020     PT/INR:  No results found for: PTINR  No results found for: LABA1C  Lab Results   Component Value Date    CHOL 150 03/10/2020    TRIG 83 03/10/2020    HDL 44 03/10/2020    LDLDIRECT 99 03/10/2020     Lab Results   Component Value Date    ALT 20 12/15/2020    AST 33 12/15/2020     TSH: No results found for: TSH      Impression and recommendations:     Atrial tachycardia- s/p cardioversion  Status post ablation of atrial fibrillation  HTN    Patient is feeling well  All symptoms have resolved  EKG obtained and reviewed- noted to be in sinus rhythm. Heart rate 70.  No atrial fibrillation or atrial flutter on EKG  Continue toprol xl 25 mg BID   Patient does not want to take Cardizem- patient to not take at this time    HF/LV dysfunction No (0)   HTN   Yes (1)  Age greater /equal 75 No (0)   DM No (0)   Stroke/TIA/TE No (0)   Vascular Disease No (0)   Age 74-69  Yes (1)  Sex Male  (0)     Chads score of 2  Continue anticoagulation    We will continue anticoagulation-Eliquis 5 mg twice daily    Vitals:    08/17/21 0904   BP: 110/68   Pulse: 70     Blood pressure is stable-continue lisinopril 2.5 mg daily    Education provided to stop alcohol and caffeine in order to have better outcomes post cardioversion in the reoccurence of atrial arrhythmia  He voiced understanding    Patient to follow up in 3 months     ROSALINO Solis CNP on 8/17/2021 at 9:13 AM

## 2021-08-20 ENCOUNTER — TELEPHONE (OUTPATIENT)
Dept: CARDIOLOGY CLINIC | Age: 71
End: 2021-08-20

## 2021-08-20 NOTE — TELEPHONE ENCOUNTER
Rubio Come with [facility] called asking for a updated cardiac clearance   Patient to have procedure 8/25 she was not aware of the recent procedure      Please call when complete   Fax to [phone]  And [facility] [phone]

## 2022-03-14 ENCOUNTER — TELEPHONE (OUTPATIENT)
Dept: CARDIOLOGY CLINIC | Age: 72
End: 2022-03-14

## 2022-03-14 NOTE — TELEPHONE ENCOUNTER
Returned phone call to patient. Patient states over the weekend he felt more fatigued and has checked his pulse a few times and states it is ranging from 70's up in to the 100's. Did offer patient an appointment this morning to be evaluated. Patient unable so scheduled follow up for tomorrow with [facility]. Did ask that patient call the office between now and then with any further questions or concerns. Patient voiced understanding.

## 2022-03-15 ENCOUNTER — NURSE ONLY (OUTPATIENT)
Dept: CARDIOLOGY CLINIC | Age: 72
End: 2022-03-15
Payer: MEDICARE

## 2022-03-15 ENCOUNTER — OFFICE VISIT (OUTPATIENT)
Dept: CARDIOLOGY CLINIC | Age: 72
End: 2022-03-15
Payer: MEDICARE

## 2022-03-15 VITALS
DIASTOLIC BLOOD PRESSURE: 72 MMHG | WEIGHT: 196.6 LBS | SYSTOLIC BLOOD PRESSURE: 110 MMHG | BODY MASS INDEX: 23.94 KG/M2 | HEIGHT: 76 IN | HEART RATE: 86 BPM

## 2022-03-15 DIAGNOSIS — E83.42 HYPOMAGNESEMIA: ICD-10-CM

## 2022-03-15 DIAGNOSIS — Z86.79 S/P ABLATION OF ATRIAL FIBRILLATION: ICD-10-CM

## 2022-03-15 DIAGNOSIS — I48.91 ATRIAL FIBRILLATION WITH RVR (HCC): Primary | ICD-10-CM

## 2022-03-15 DIAGNOSIS — I48.19 PERSISTENT ATRIAL FIBRILLATION (HCC): ICD-10-CM

## 2022-03-15 DIAGNOSIS — Z98.890 S/P ABLATION OF ATRIAL FIBRILLATION: ICD-10-CM

## 2022-03-15 DIAGNOSIS — Z79.899 ON AMIODARONE THERAPY: ICD-10-CM

## 2022-03-15 DIAGNOSIS — I47.1 ATRIAL TACHYCARDIA (HCC): ICD-10-CM

## 2022-03-15 DIAGNOSIS — E83.42 HYPOMAGNESEMIA: Primary | ICD-10-CM

## 2022-03-15 DIAGNOSIS — I10 ESSENTIAL HYPERTENSION: ICD-10-CM

## 2022-03-15 LAB
A/G RATIO: 2 (ref 1.1–2.2)
ALBUMIN SERPL-MCNC: 4.2 G/DL (ref 3.4–5)
ALP BLD-CCNC: 48 U/L (ref 40–129)
ALT SERPL-CCNC: 13 U/L (ref 10–40)
ANION GAP SERPL CALCULATED.3IONS-SCNC: 13 MMOL/L (ref 3–16)
AST SERPL-CCNC: 19 U/L (ref 15–37)
BILIRUB SERPL-MCNC: 0.4 MG/DL (ref 0–1)
BUN BLDV-MCNC: 21 MG/DL (ref 7–20)
CALCIUM SERPL-MCNC: 9.2 MG/DL (ref 8.3–10.6)
CHLORIDE BLD-SCNC: 103 MMOL/L (ref 99–110)
CO2: 22 MMOL/L (ref 21–32)
CREAT SERPL-MCNC: 1.2 MG/DL (ref 0.8–1.3)
GFR AFRICAN AMERICAN: >60
GFR NON-AFRICAN AMERICAN: 59
GLUCOSE BLD-MCNC: 93 MG/DL (ref 70–99)
MAGNESIUM: 2.2 MG/DL (ref 1.8–2.4)
POTASSIUM SERPL-SCNC: 4.6 MMOL/L (ref 3.5–5.1)
SODIUM BLD-SCNC: 138 MMOL/L (ref 136–145)
TOTAL PROTEIN: 6.3 G/DL (ref 6.4–8.2)
TSH REFLEX: 3.74 UIU/ML (ref 0.27–4.2)

## 2022-03-15 PROCEDURE — 99214 OFFICE O/P EST MOD 30 MIN: CPT | Performed by: NURSE PRACTITIONER

## 2022-03-15 PROCEDURE — G8427 DOCREV CUR MEDS BY ELIG CLIN: HCPCS | Performed by: NURSE PRACTITIONER

## 2022-03-15 PROCEDURE — 93000 ELECTROCARDIOGRAM COMPLETE: CPT | Performed by: NURSE PRACTITIONER

## 2022-03-15 PROCEDURE — 1036F TOBACCO NON-USER: CPT | Performed by: NURSE PRACTITIONER

## 2022-03-15 PROCEDURE — G8420 CALC BMI NORM PARAMETERS: HCPCS | Performed by: NURSE PRACTITIONER

## 2022-03-15 PROCEDURE — G8484 FLU IMMUNIZE NO ADMIN: HCPCS | Performed by: NURSE PRACTITIONER

## 2022-03-15 PROCEDURE — 4040F PNEUMOC VAC/ADMIN/RCVD: CPT | Performed by: NURSE PRACTITIONER

## 2022-03-15 PROCEDURE — 36415 COLL VENOUS BLD VENIPUNCTURE: CPT | Performed by: NURSE PRACTITIONER

## 2022-03-15 PROCEDURE — 1123F ACP DISCUSS/DSCN MKR DOCD: CPT | Performed by: NURSE PRACTITIONER

## 2022-03-15 PROCEDURE — 3017F COLORECTAL CA SCREEN DOC REV: CPT | Performed by: NURSE PRACTITIONER

## 2022-03-15 RX ORDER — LISINOPRIL 2.5 MG/1
2.5 TABLET ORAL DAILY
Qty: 90 TABLET | Refills: 3 | Status: SHIPPED | OUTPATIENT
Start: 2022-03-15

## 2022-03-15 RX ORDER — AMIODARONE HYDROCHLORIDE 200 MG/1
200 TABLET ORAL 2 TIMES DAILY
Qty: 28 TABLET | Refills: 0 | Status: SHIPPED | OUTPATIENT
Start: 2022-03-15 | End: 2022-05-27 | Stop reason: ALTCHOICE

## 2022-03-15 RX ORDER — METOPROLOL SUCCINATE 25 MG/1
25 TABLET, EXTENDED RELEASE ORAL DAILY
Qty: 60 TABLET | Refills: 3 | Status: SHIPPED | OUTPATIENT
Start: 2022-03-15

## 2022-03-15 RX ORDER — AMIODARONE HYDROCHLORIDE 200 MG/1
200 TABLET ORAL DAILY
Qty: 30 TABLET | Refills: 3 | Status: SHIPPED | OUTPATIENT
Start: 2022-03-30 | End: 2022-08-01

## 2022-03-15 NOTE — PATIENT INSTRUCTIONS
Please be informed that if you contact our office outside of normal business hours the physician on call cannot help with any scheduling or rescheduling issues, procedure instruction questions or any type of medication issue. We advise you for any urgent/emergency that you go to the nearest emergency room! PLEASE CALL OUR OFFICE DURING NORMAL BUSINESS HOURS    Monday - Friday   8 am to 5 pm    [place]: [place] [address]: [phone]    [place]:  [phone]    **It is YOUR responsibilty to bring medication bottles and/or updated medication list to [address] [place] [address].  This will allow us to better serve you and all your healthcare needs**

## 2022-03-15 NOTE — PROGRESS NOTES
Electrophysiology Follow up    Reason for consultation: fluttering      Chief complaint: here with complaints of fatigue, palpitations, and chest tightness    Referring physician: Dr. Niurka Crum     Primary care physician: Devan Moses MD     History of Present Illness: This visit 3/15/2022  Patient here today with complaints of fatigue, palpitations and chest tightness. Patient reports the symptoms started a few days ago. He suspects they started around Friday. He states that he did not feel well over the weekend. He reports that today he is feeling a little better. He denies aggravating or alleviating factors. He denies lightheadedness, dizziness, edema or syncope. He is still taking his Eliquis and Toprol-XL. He states he is only taking toprol xl 1 time a day. He drinks 12 ounces of beer every 3 days. He does not drink caffeine    Previous visit 8/17/2021  Patient here today for 1 month follow-up cardioversion. Patient reports that he has more energy. He reports that he has not had any palpitations and his fatigue has resolved. He denies chest pain, lightheadedness, dizziness, edema or syncope  He is not taking Cardizem  He tells me that he was drinking 1 beer a day and drinking coffee daily and he thinks this is why he went back into the arrhythmia. Previous visit 7/14/2021  Patient is here today for follow-up on tachycardia with associated fatigue. Patient was seen 2 weeks ago and was started on Cardizem. Patient reports that he did not start the Cardizem because he was feeling fatigued. He states that he continues to have fatigue and palpitations that are intermittent in nature. He states the dizziness has resolved. He reports he will have these symptoms both at rest and with ambulation. He reports that symptoms are unchanged from previous office visit. He reports that he has stopped drinking 1 beer a day. He is taking his medications as prescribed.     Previous visit 6/30/2021  Patient is here today with complaints of dizziness, fatigue, palpitations. He reports the symptoms started about 2 weeks ago. He states they are intermittent in nature. He reports he will have these symptoms both at rest and with ambulation. He denies alleviating factors. He states that he will wake up in the middle of the night having palpitations. He states that it is a pounding sensation that lasts 5 to 15 minutes. He reports this is happening every night. He will have associated chest tightness shortness of breath and lightheadedness. Patient is taking his medications as prescribed. He states that he recently started drinking 1 beer a day. Previous visit 5/4/2021  Patient here for 4-month follow-up status post ablation of atrial fibrillation. He reports that he is feeling well. He denies episodes of palpitations or tachycardia. He additionally denies chest pain, lightheadedness, dizziness, shortness of breath edema or syncope. He continues to not drink alcohol, caffeine or smoke cigarettes. He is here today to go over his 30-day event monitor as he would like to come off of his Tikosyn. Previous visit 3/18/2021  Patient is here today for 3-month follow-up status post ablation of atrial fibrillation. He reports that he continues to have episodes of palpitations in the evening time and when in bed. He denies tachycardia. He denies chest pain, lightheadedness, shortness of breath, dizziness, edema or syncope. He continues to refrain from caffeine, alcohol and smoking. Previous visit 1/14/2021  Patient is here today for 1 month follow-up status post ablation of atrial fibrillation. Patient reports he has been feeling well. He states that he has had a few episodes where he will roll over in bed and feel his heart beat. He denies palpitations he denies tachycardia. He additionally denies chest pain, lightheadedness, dizziness, edema or syncope.   He is abstaining from alcohol and caffeine. Previous visit  Ginger Romero states that he is feeling well. He has noticed palpitations approximate 3 days post discharge. He did have an episode of shortness of breath which was transient during the palpitations. Lasting only a few seconds. Has also noted occasional dizziness. No syncope or near syncope    Previous visit on 10/14/2020  Patient is a 69 yr old male with hx of atrial fibrillation referred here for management of atrial fibrillation. Pt states he feels fluttering and skipping when laying down. Tiredness and weakness. Pt denies chest pain, shortness of breath, dizziness, and edema. Pt drinks two cups of coffee daily. Past Medical History:   Diagnosis Date    Atrial fibrillation [facility])     follows with Dr Angle Cornejo CHF (congestive heart failure) ([facility] [address] 03/2020    Enlarged prostate     H/O echocardiogram 05/21/2020    Mild prolapse anterior mitral valve leaflet,EF20%,no thrombus    H/O percutaneous left heart catheterization     [place] (hard of hearing)     no hearing aides    Hypotension     Indwelling catheter present on admission     \"had catheter in since March 2020\"    Iron deficiency anemia     PONV (postoperative nausea and vomiting)     \"sick in 1981 but did fine after that with other surgeries\"    S/P ablation of atrial fibrillation 12/14/2020    atrial fibrillation ablation with Dr. Andreea Baker.  UTI (urinary tract infection)     week 7/20/2020- was on antibiotic- had repeat urine test 7/27/2020 but never heard the results\"    Wears glasses      Past Surgical History:   Procedure Laterality Date    ARM SURGERY Left 1981    \"have plate and screws still in place\"   [address]      per old chart had cath 3/2020    CARDIOVERSION  05/2020    \"had cardioversion- worked for some time but the atrial fib is back\"    CARDIOVERSION N/A 07/15/2021    Successful SEEMA/CV performed by Dr. Andreea Baker.     COLONOSCOPY  2010    EYE SURGERY  2015    left eye cataract ext     TURP N/A 08/06/2020    CYSTOSCOPY W/BIPOLAR TURP REMOVAL OF BLADDER STONES performed by Gia Coello MD at [address] History   Problem Relation Age of Onset   [place]illian Copeland Cancer Mother         thyroid cancer    Cancer Father         lymphoma     Social History     Tobacco Use    Smoking status: Never Smoker    Smokeless tobacco: Never Used   Substance Use Topics    Alcohol use: Not on file     Comment: \"drink 1-2 beers per day\"        Review of Systems   Constitution: Negative for diaphoresis and malaise/    positive for fatigue  HENT: Negative for congestion, hoarse voice and tinnitus. Eyes: Negative for pain and visual disturbance. Cardiovascular: Negative for chest pain, positive dyspnea on exertion, positive irregular heartbeat, negative leg swelling, negative near-syncope, positive for palpitation  Respiratory: Negative for cough and shortness of breath. Endocrine: Negative for cold intolerance and heat intolerance. Hematologic/Lymphatic: Negative for adenopathy and bleeding problem. Skin: Negative for color change and poor wound healing. Musculoskeletal: Positive for arthritis. Negative for muscle weakness. Gastrointestinal: Negative for abdominal pain and melena. Genitourinary: Negative for flank pain and hematuria. Neurological: Positive for dizziness and light-headedness. Psychiatric/Behavioral: Negative for depression. The patient is not nervous/anxious.         /72   Pulse 86   Ht 6' 4\" (1.93 m)   Wt 196 lb 9.6 oz (89.2 kg)   BMI 23.93 kg/m²   Wt Readings from Last 3 Encounters:   03/15/22 196 lb 9.6 oz (89.2 kg)   08/17/21 185 lb 12.8 oz (84.3 kg)   07/15/21 187 lb (84.8 kg)       Current Outpatient Medications   Medication Sig Dispense Refill    metoprolol succinate (TOPROL XL) 25 MG extended release tablet Take 1 tablet by mouth daily 60 tablet 3    lisinopril (PRINIVIL;ZESTRIL) 2.5 MG tablet Take 1 tablet by mouth daily 90 tablet 3    apixaban (ELIQUIS) 5 MG TABS tablet Take 1 tablet by mouth 2 times daily 60 tablet 11    amiodarone (CORDARONE) 200 MG tablet Take 1 tablet by mouth 2 times daily for 14 days 28 tablet 0    [START ON 3/30/2022] amiodarone (CORDARONE) 200 MG tablet Take 1 tablet by mouth daily 30 tablet 3    ibuprofen (ADVIL;MOTRIN) 200 MG tablet Take 400 mg by mouth PRN headache      pantoprazole (PROTONIX) 40 MG tablet Take 1 tablet by mouth daily (Patient not taking: Reported on 3/15/2022) 90 tablet 3     No current facility-administered medications for this visit. Physical Exam  Constitutional:       Appearance: Normal appearance. He is not ill-appearing or diaphoretic. HENT:      Head: Normocephalic and atraumatic. Right Ear: External ear normal.      Left Ear: External ear normal.      Nose: Nose normal.      Mouth/Throat:      Mouth: Mucous membranes are moist.      Pharynx: Oropharynx is clear. Eyes:      General:         Right eye: No discharge. Left eye: No discharge. Pupils: Pupils are equal, round, and reactive to light. Neck:      Musculoskeletal: Normal range of motion and neck supple. Vascular: No carotid bruit. Cardiovascular:      Rate and Rhythm: Irregular rate and rhythm     Pulses: Normal pulses. Heart sounds: No murmur. Pulmonary:      Effort: Pulmonary effort is normal.      Breath sounds: Normal breath sounds. No wheezing or rhonchi. Abdominal:      General: There is no distension. Palpations: Abdomen is soft. Tenderness: There is no abdominal tenderness. Musculoskeletal: Normal range of motion. Right lower leg: No edema. Left lower leg: No edema. Skin:     General: Skin is warm. Capillary Refill: Capillary refill takes less than 2 seconds. Neurological:      General: No focal deficit present. Mental Status: He is alert and oriented to person, place, and time.    Psychiatric:         Mood and Affect: Mood normal.         Behavior: Behavior normal. DATA:  Lab Results   Component Value Date    TROPONINT 0.010 (H) 03/07/2020     BNP:    Lab Results   Component Value Date    PROBNP 10,332 (H) 03/06/2020     PT/INR:  No results found for: PTINR  No results found for: LABA1C  Lab Results   Component Value Date    CHOL 150 03/10/2020    TRIG 83 03/10/2020    HDL 44 03/10/2020    LDLDIRECT 99 03/10/2020     Lab Results   Component Value Date    ALT 20 12/15/2020    AST 33 12/15/2020     TSH: No results found for: TSH      Impression and recommendations:     Paroxysmal atrial fibrillation  Atrial tachycardia- s/p cardioversion  Status post ablation of atrial fibrillation  HTN    Patient here today with complaints of palpitations, fatigue and chest pressure  Patient thinks he has gone back into atrial fibrillation  Patient had cardioversion and November 2020 and was started on Tikosyn. Patient unfortunately reverted back to atrial fibrillation and had ablation and December 2020. Patient had isolation of pulmonary veins and ablation of posterior wall isolation with posterior floor line and roofline ablation. After 3-month blanking period patient had monitor and noted to have nonsustained VT. Tikosyn was stopped. No atrial fibrillation noted on event monitor at that time  Patient unfortunately reverted back to atrial tachycardia in July 2021 and had successful cardioversion at that time  Patient had been on Cardizem at one time however was stopped because patient did not like the way it made him feel. EKG obtained and reviewed patient noted to be in atrial fibrillation  Patient reports that he is drinking 12 ounces of beer every 3 days  Discussed with Dr Kaila Brody  Will start amiodarone 200 mg twice daily for 14 days then 200 mg daily  Patient instructed not to start amiodarone until he gets the following labs, BMP, LFT, and TSH  We will plan for cardioversion in 2 weeks    Patient denies any issues with bleeding.   We will continue Eliquis 5 mg twice daily  HF/LV dysfunction No (0)   HTN   Yes (1)  Age greater /equal 75 No (0)   DM No (0)   Stroke/TIA/TE No (0)   Vascular Disease No (0)   Age 74-69  Yes (1)  Sex Male  (0)     Chads score of 2  Continue anticoagulation    Vitals:    03/15/22 0934   BP: 110/72   Pulse: 86     Blood pressure is stable-continue lisinopril 2.5 mg daily    Risk of cardioversion discussed with patient who voiced understanding and agrees to proceed with cardioversion     Josh Dance, APRN - CNP on 3/15/2022 at 10:21 AM

## 2022-03-16 ENCOUNTER — TELEPHONE (OUTPATIENT)
Dept: CARDIOLOGY CLINIC | Age: 72
End: 2022-03-16

## 2022-03-16 RX ORDER — PANTOPRAZOLE SODIUM 40 MG/1
40 TABLET, DELAYED RELEASE ORAL DAILY
Qty: 90 TABLET | Refills: 3 | OUTPATIENT
Start: 2022-03-16

## 2022-03-16 NOTE — TELEPHONE ENCOUNTER
Spoke with patient and he is aware he can start amiodarone as directed. Patient states he will pick prescription up this afternoon. Did ask patient to call with any questions or concerns. Patient voiced understanding.

## 2022-03-16 NOTE — TELEPHONE ENCOUNTER
Patient seen in the office yesterday with Jesus Butler CNP. Per Jesus Butler patient to start Amiodarone 200 mg twice daily for a total of 14 days and then to take 200 mg once daily there after, but explained to patient not to start medication until labs completed and resulted and patient advised to start medication. Lab results received and reviewed with Jesus Butler CNP (see results in Epic) and per Jeuss Butler patient to start amiodarone as ordered. Tried to reach patient to advise. No answer. Message left asking aptient to return phone call when available.

## 2022-03-16 NOTE — TELEPHONE ENCOUNTER
Tried to returned phone call to patient and advised should still be taking metoprolol and prescription refill was sent in yesterday along with refill on for eliquis and lisinopril.

## 2022-03-16 NOTE — TELEPHONE ENCOUNTER
Spoke with patient who states was told by pharmacy in September refill on metoprolol was denied by the provider with no response and so patient reports has not been taking for the last several months. Patient asking if this is something he was to be on why was he told it was denied to pharmacy? Upon review of patients chart can not find where metoprolol refill request was received and denied to [address] at any time, do note that Cardizem was started but stopped and per office note due to patient unable to tolerate. Kentrell Sotomayor CNP also reviewed chart and states it was never advised patient to stop metoprolol. Per Kentrell Sotomayor, patient should be taking metoprolol as prescribed. Patient is aware of this. Patient not sure how the miscommunication happened and wants to know what will be done so this doesn't happen in the future. Again explained to patient per his Epic chart this nurse does not see where a refill request was received to be denied and that patient should have reached out to this office when pharmacy refused to fill prescription as he was never advised to stop this medication. Patient states he would like management to further look in to this situation as he feels there was an error in commuication. Advised will forward to  to review. Patient voiced understanding.

## 2022-03-17 RX ORDER — PANTOPRAZOLE SODIUM 40 MG/1
TABLET, DELAYED RELEASE ORAL
Qty: 90 TABLET | Refills: 0 | OUTPATIENT
Start: 2022-03-17

## 2022-03-18 DIAGNOSIS — I48.19 PERSISTENT ATRIAL FIBRILLATION (HCC): Primary | ICD-10-CM

## 2022-03-31 ENCOUNTER — NURSE ONLY (OUTPATIENT)
Dept: CARDIOLOGY CLINIC | Age: 72
End: 2022-03-31
Payer: MEDICARE

## 2022-03-31 DIAGNOSIS — R00.2 PALPITATIONS: ICD-10-CM

## 2022-03-31 PROCEDURE — 93000 ELECTROCARDIOGRAM COMPLETE: CPT | Performed by: NURSE PRACTITIONER

## 2022-04-20 RX ORDER — PANTOPRAZOLE SODIUM 40 MG/1
40 TABLET, DELAYED RELEASE ORAL DAILY
Qty: 90 TABLET | Refills: 3 | Status: SHIPPED | OUTPATIENT
Start: 2022-04-20

## 2022-05-06 NOTE — PROGRESS NOTES
05/06/22 0830   Plan   Plan SS spoke to pt who says spouse and daughter came to visit him around 7pm yesterday.  Pt provided SS with phone number for daughter Lacey.  Contacted daughter    [phone] and left message to call SS.  Informed pt spouse needs to come for education with therapists and nursing for wound care before he is discharged home.  Pt says spouse has provided wound care in the past and agreeable to help again.   Patient/Family in Agreement with Plan yes      Received from cath lab. Monitor and alarms on. Call Light in reach.

## 2022-05-27 ENCOUNTER — OFFICE VISIT (OUTPATIENT)
Dept: CARDIOLOGY CLINIC | Age: 72
End: 2022-05-27
Payer: MEDICARE

## 2022-05-27 VITALS
HEIGHT: 76 IN | DIASTOLIC BLOOD PRESSURE: 80 MMHG | HEART RATE: 63 BPM | WEIGHT: 200.6 LBS | SYSTOLIC BLOOD PRESSURE: 110 MMHG | BODY MASS INDEX: 24.43 KG/M2

## 2022-05-27 DIAGNOSIS — I10 ESSENTIAL HYPERTENSION: ICD-10-CM

## 2022-05-27 DIAGNOSIS — Z79.899 ON AMIODARONE THERAPY: ICD-10-CM

## 2022-05-27 DIAGNOSIS — I47.1 ATRIAL TACHYCARDIA (HCC): ICD-10-CM

## 2022-05-27 DIAGNOSIS — I48.0 PAROXYSMAL ATRIAL FIBRILLATION (HCC): Primary | ICD-10-CM

## 2022-05-27 PROBLEM — Z51.81 VISIT FOR MONITORING TIKOSYN THERAPY: Status: RESOLVED | Noted: 2020-11-09 | Resolved: 2022-05-27

## 2022-05-27 PROBLEM — Z98.890 S/P ABLATION OF ATRIAL FIBRILLATION: Status: RESOLVED | Noted: 2020-12-14 | Resolved: 2022-05-27

## 2022-05-27 PROBLEM — Z86.79 S/P ABLATION OF ATRIAL FIBRILLATION: Status: RESOLVED | Noted: 2020-12-14 | Resolved: 2022-05-27

## 2022-05-27 PROCEDURE — G8427 DOCREV CUR MEDS BY ELIG CLIN: HCPCS | Performed by: NURSE PRACTITIONER

## 2022-05-27 PROCEDURE — 93000 ELECTROCARDIOGRAM COMPLETE: CPT | Performed by: NURSE PRACTITIONER

## 2022-05-27 PROCEDURE — 1123F ACP DISCUSS/DSCN MKR DOCD: CPT | Performed by: NURSE PRACTITIONER

## 2022-05-27 PROCEDURE — 3017F COLORECTAL CA SCREEN DOC REV: CPT | Performed by: NURSE PRACTITIONER

## 2022-05-27 PROCEDURE — G8420 CALC BMI NORM PARAMETERS: HCPCS | Performed by: NURSE PRACTITIONER

## 2022-05-27 PROCEDURE — 1036F TOBACCO NON-USER: CPT | Performed by: NURSE PRACTITIONER

## 2022-05-27 PROCEDURE — 99214 OFFICE O/P EST MOD 30 MIN: CPT | Performed by: NURSE PRACTITIONER

## 2022-05-27 NOTE — PROGRESS NOTES
Electrophysiology Follow up    Reason for consultation: fluttering      Chief complaint: Here today for follow-up on atrial fibrillation    Referring physician: Dr. Nicola Elliott     Primary care physician: Colin Vega MD     History of Present Illness: This visit 5/27/2022  Patient is here today for follow-up on atrial fibrillation. He reports he has been feeling well. He denies chest pain, palpitations, shortness of breath, lightheadedness, dizziness, edema or syncope. He is taking his medications as prescribed. He states that he feels like he has more energy on amiodarone. Previous visit 3/15/2022  Patient here today with complaints of fatigue, palpitations and chest tightness. Patient reports the symptoms started a few days ago. He suspects they started around Friday. He states that he did not feel well over the weekend. He reports that today he is feeling a little better. He denies aggravating or alleviating factors. He denies lightheadedness, dizziness, edema or syncope. He is still taking his Eliquis and Toprol-XL. He states he is only taking toprol xl 1 time a day. He drinks 12 ounces of beer every 3 days. He does not drink caffeine    Previous visit 8/17/2021  Patient here today for 1 month follow-up cardioversion. Patient reports that he has more energy. He reports that he has not had any palpitations and his fatigue has resolved. He denies chest pain, lightheadedness, dizziness, edema or syncope  He is not taking Cardizem  He tells me that he was drinking 1 beer a day and drinking coffee daily and he thinks this is why he went back into the arrhythmia. Previous visit 7/14/2021  Patient is here today for follow-up on tachycardia with associated fatigue. Patient was seen 2 weeks ago and was started on Cardizem. Patient reports that he did not start the Cardizem because he was feeling fatigued.   He states that he continues to have fatigue and palpitations that are intermittent in nature. He states the dizziness has resolved. He reports he will have these symptoms both at rest and with ambulation. He reports that symptoms are unchanged from previous office visit. He reports that he has stopped drinking 1 beer a day. He is taking his medications as prescribed. Previous visit 6/30/2021  Patient is here today with complaints of dizziness, fatigue, palpitations. He reports the symptoms started about 2 weeks ago. He states they are intermittent in nature. He reports he will have these symptoms both at rest and with ambulation. He denies alleviating factors. He states that he will wake up in the middle of the night having palpitations. He states that it is a pounding sensation that lasts 5 to 15 minutes. He reports this is happening every night. He will have associated chest tightness shortness of breath and lightheadedness. Patient is taking his medications as prescribed. He states that he recently started drinking 1 beer a day. Previous visit 5/4/2021  Patient here for 4-month follow-up status post ablation of atrial fibrillation. He reports that he is feeling well. He denies episodes of palpitations or tachycardia. He additionally denies chest pain, lightheadedness, dizziness, shortness of breath edema or syncope. He continues to not drink alcohol, caffeine or smoke cigarettes. He is here today to go over his 30-day event monitor as he would like to come off of his Tikosyn. Previous visit 3/18/2021  Patient is here today for 3-month follow-up status post ablation of atrial fibrillation. He reports that he continues to have episodes of palpitations in the evening time and when in bed. He denies tachycardia. He denies chest pain, lightheadedness, shortness of breath, dizziness, edema or syncope. He continues to refrain from caffeine, alcohol and smoking.     Previous visit 1/14/2021  Patient is here today for 1 month follow-up status post ablation of atrial fibrillation. Patient reports he has been feeling well. He states that he has had a few episodes where he will roll over in bed and feel his heart beat. He denies palpitations he denies tachycardia. He additionally denies chest pain, lightheadedness, dizziness, edema or syncope. He is abstaining from alcohol and caffeine. Previous visit  Zari Loomis states that he is feeling well. He has noticed palpitations approximate 3 days post discharge. He did have an episode of shortness of breath which was transient during the palpitations. Lasting only a few seconds. Has also noted occasional dizziness. No syncope or near syncope    Previous visit on 10/14/2020  Patient is a 69 yr old male with hx of atrial fibrillation referred here for management of atrial fibrillation. Pt states he feels fluttering and skipping when laying down. Tiredness and weakness. Pt denies chest pain, shortness of breath, dizziness, and edema. Pt drinks two cups of coffee daily. Past Medical History:   Diagnosis Date    Atrial fibrillation [facility])     follows with Dr Slim Wallace CHF (congestive heart failure) ([facility] [address] 03/2020    Enlarged prostate     H/O echocardiogram 05/21/2020    Mild prolapse anterior mitral valve leaflet,EF20%,no thrombus    H/O percutaneous left heart catheterization     [place] (hard of hearing)     no hearing aides    Hypotension     Indwelling catheter present on admission     \"had catheter in since March 2020\"    Iron deficiency anemia     PONV (postoperative nausea and vomiting)     \"sick in 1981 but did fine after that with other surgeries\"    S/P ablation of atrial fibrillation 12/14/2020    atrial fibrillation ablation with Dr. Lorena Canales.     UTI (urinary tract infection)     week 7/20/2020- was on antibiotic- had repeat urine test 7/27/2020 but never heard the results\"    Wears glasses      Past Surgical History:   Procedure Laterality Date    ARM SURGERY Left 1981    \"have plate and screws still in place\"    CARDIAC CATHETERIZATION      per old chart had cath 3/2020    CARDIOVERSION  05/2020    \"had cardioversion- worked for some time but the atrial fib is back\"    CARDIOVERSION N/A 07/15/2021    Successful SEEMA/CV performed by Dr. [facility]oln Eye.  COLONOSCOPY  2010    EYE SURGERY  2015    left eye cataract ext     TURP N/A 08/06/2020    CYSTOSCOPY W/BIPOLAR TURP REMOVAL OF BLADDER STONES performed by Hesham Pollard MD at [address] History   Problem Relation Age of Onset   Zannie Cowden Cancer Mother         thyroid cancer    Cancer Father         lymphoma     Social History     Tobacco Use    Smoking status: Never Smoker    Smokeless tobacco: Never Used   Substance Use Topics    Alcohol use: Not on file     Comment: \"drink 1-2 beers per day\"        Review of Systems   Constitution: Negative for diaphoresis and malaise/    positive for fatigue  HENT: Negative for congestion, hoarse voice and tinnitus. Eyes: Negative for pain and visual disturbance. Cardiovascular: Negative for chest pain, positive dyspnea on exertion, positive irregular heartbeat, negative leg swelling, negative near-syncope, positive for palpitation  Respiratory: Negative for cough and shortness of breath. Endocrine: Negative for cold intolerance and heat intolerance. Hematologic/Lymphatic: Negative for adenopathy and bleeding problem. Skin: Negative for color change and poor wound healing. Musculoskeletal: Positive for arthritis. Negative for muscle weakness. Gastrointestinal: Negative for abdominal pain and melena. Genitourinary: Negative for flank pain and hematuria. Neurological: Positive for dizziness and light-headedness. Psychiatric/Behavioral: Negative for depression. The patient is not nervous/anxious.         /80   Pulse 63   Ht 6' 4\" (1.93 m)   Wt 200 lb 9.6 oz (91 kg)   BMI 24.42 kg/m²   Wt Readings from Last 3 Encounters:   05/27/22 200 lb 9.6 oz (91 kg)   03/15/22 196 lb 9.6 oz (89.2 kg)   08/17/21 185 lb 12.8 oz (84.3 kg)       Current Outpatient Medications   Medication Sig Dispense Refill    pantoprazole (PROTONIX) 40 MG tablet Take 1 tablet by mouth daily 90 tablet 3    metoprolol succinate (TOPROL XL) 25 MG extended release tablet Take 1 tablet by mouth daily 60 tablet 3    lisinopril (PRINIVIL;ZESTRIL) 2.5 MG tablet Take 1 tablet by mouth daily 90 tablet 3    apixaban (ELIQUIS) 5 MG TABS tablet Take 1 tablet by mouth 2 times daily 60 tablet 11    amiodarone (CORDARONE) 200 MG tablet Take 1 tablet by mouth daily 30 tablet 3    ibuprofen (ADVIL;MOTRIN) 200 MG tablet Take 400 mg by mouth PRN headache       No current facility-administered medications for this visit. Physical Exam  Constitutional:       Appearance: Normal appearance. He is not ill-appearing or diaphoretic. HENT:      Head: Normocephalic and atraumatic. Right Ear: External ear normal.      Left Ear: External ear normal.      Nose: Nose normal.      Mouth/Throat:      Mouth: Mucous membranes are moist.      Pharynx: Oropharynx is clear. Eyes:      General:         Right eye: No discharge. Left eye: No discharge. Pupils: Pupils are equal, round, and reactive to light. Neck:      Musculoskeletal: Normal range of motion and neck supple. Vascular: No carotid bruit. Cardiovascular:      Rate and Rhythm: Irregular rate and rhythm     Pulses: Normal pulses. Heart sounds: No murmur. Pulmonary:      Effort: Pulmonary effort is normal.      Breath sounds: Normal breath sounds. No wheezing or rhonchi. Abdominal:      General: There is no distension. Palpations: Abdomen is soft. Tenderness: There is no abdominal tenderness. Musculoskeletal: Normal range of motion. Right lower leg: No edema. Left lower leg: No edema. Skin:     General: Skin is warm. Capillary Refill: Capillary refill takes less than 2 seconds. Neurological:      General: No focal deficit present.       Mental Status: He is alert and oriented to person, place, and time. Psychiatric:         Mood and Affect: Mood normal.         Behavior: Behavior normal.       DATA:  Lab Results   Component Value Date    TROPONINT 0.010 (H) 03/07/2020     BNP:    Lab Results   Component Value Date    PROBNP 10,332 (H) 03/06/2020     PT/INR:  No results found for: PTINR  No results found for: LABA1C  Lab Results   Component Value Date    CHOL 150 03/10/2020    TRIG 83 03/10/2020    HDL 44 03/10/2020    LDLDIRECT 99 03/10/2020     Lab Results   Component Value Date    ALT 13 03/15/2022    AST 19 03/15/2022     TSH: No results found for: TSH      Impression and recommendations:     Paroxysmal atrial fibrillation  Atrial tachycardia- s/p cardioversion  Status post ablation of atrial fibrillation  HTN    EKG obtained and reviewed patient noted to be in sinus rhythm  No atrial fibrillation noted  Patient is feeling well on amiodarone. He states that he feels like he has more energy  We will continue amiodarone 200 mg daily  Patient will need regular eye exams yearly  Will get PFT, CMP andTSH at next visit      Patient denies any issues with bleeding.   We will continue Eliquis 5 mg twice daily  HF/LV dysfunction No (0)   HTN   Yes (1)  Age greater /equal 75 No (0)   DM No (0)   Stroke/TIA/TE No (0)   Vascular Disease No (0)   Age 74-69  Yes (1)  Sex Male  (0)     Chads score of 2  Continue anticoagulation    Vitals:    05/27/22 1059   BP: 110/80   Pulse: 63     Blood pressure is stable-continue lisinopril 2.5 mg daily and Toprol-XL 25 mg daily    Patient to follow-up in 6 months or sooner if needed     ROSALINO Fitch CNP on 5/27/2022 at 1:07 PM

## 2022-08-01 RX ORDER — AMIODARONE HYDROCHLORIDE 200 MG/1
TABLET ORAL
Qty: 30 TABLET | Refills: 3 | Status: SHIPPED | OUTPATIENT
Start: 2022-08-01

## 2022-11-18 ENCOUNTER — NURSE ONLY (OUTPATIENT)
Dept: CARDIOLOGY CLINIC | Age: 72
End: 2022-11-18

## 2022-11-18 ENCOUNTER — OFFICE VISIT (OUTPATIENT)
Dept: CARDIOLOGY CLINIC | Age: 72
End: 2022-11-18

## 2022-11-18 VITALS
HEIGHT: 76 IN | HEART RATE: 57 BPM | BODY MASS INDEX: 25.18 KG/M2 | WEIGHT: 206.8 LBS | DIASTOLIC BLOOD PRESSURE: 62 MMHG | SYSTOLIC BLOOD PRESSURE: 138 MMHG

## 2022-11-18 DIAGNOSIS — I48.91 ATRIAL FIBRILLATION WITH RVR (HCC): ICD-10-CM

## 2022-11-18 DIAGNOSIS — Z79.899 ON AMIODARONE THERAPY: ICD-10-CM

## 2022-11-18 DIAGNOSIS — I47.1 PAT (PAROXYSMAL ATRIAL TACHYCARDIA) (HCC): Primary | ICD-10-CM

## 2022-11-18 NOTE — PROGRESS NOTES
30 days e-cardio monitor placed.  # [id number]. Instructed patient on how to record the event and to call monitoring center at [phone] if any problems arise. Instructed patient to disconnect the lead wires from the electrodes before bathing or showering and reattach them afterwards. Instructed patient that the electrodes should be changed every 3 days or if they no longer adhere to the skin. Patient to mail package after the monitor has ended. Patient verbalized understanding.

## 2022-11-18 NOTE — PROGRESS NOTES
Electrophysiology FU NOTE    Reason for consultation: fluttering      Chief complaint:  6 MONTH FOLLOW UP, PALPITATIONS    Referring physician: Dr. Eula Chauhan     Primary care physician: Kyra Khan MD     History of Present Illness: This visit (11/18/2022)      Chief Complaint   Patient presents with    6 Month Follow-Up     No other complaints    Palpitations     Feels them at night           Previous visit:         Previous visit 7/14/2021  Patient is here today for follow-up on tachycardia with associated fatigue. Patient was seen 2 weeks ago and was started on Cardizem. Patient reports that he did not start the Cardizem because he was feeling fatigued. He states that he continues to have fatigue and palpitations that are intermittent in nature. He states the dizziness has resolved. He reports he will have these symptoms both at rest and with ambulation. He reports that symptoms are unchanged from previous office visit. He reports that he has stopped drinking 1 beer a day. He is taking his medications as prescribed. Previous visit 6/30/2021  Patient is here today with complaints of dizziness, fatigue, palpitations. He reports the symptoms started about 2 weeks ago. He states they are intermittent in nature. He reports he will have these symptoms both at rest and with ambulation. He denies alleviating factors. He states that he will wake up in the middle of the night having palpitations. He states that it is a pounding sensation that lasts 5 to 15 minutes. He reports this is happening every night. He will have associated chest tightness shortness of breath and lightheadedness. Patient is taking his medications as prescribed. He states that he recently started drinking 1 beer a day. Previous visit 5/4/2021  Patient here for 4-month follow-up status post ablation of atrial fibrillation. He reports that he is feeling well. He denies episodes of palpitations or tachycardia.   He additionally denies chest pain, lightheadedness, dizziness, shortness of breath edema or syncope. He continues to not drink alcohol, caffeine or smoke cigarettes. He is here today to go over his 30-day event monitor as he would like to come off of his Tikosyn. Previous visit 3/18/2021  Patient is here today for 3-month follow-up status post ablation of atrial fibrillation. He reports that he continues to have episodes of palpitations in the evening time and when in bed. He denies tachycardia. He denies chest pain, lightheadedness, shortness of breath, dizziness, edema or syncope. He continues to refrain from caffeine, alcohol and smoking. Previous visit 1/14/2021  Patient is here today for 1 month follow-up status post ablation of atrial fibrillation. Patient reports he has been feeling well. He states that he has had a few episodes where he will roll over in bed and feel his heart beat. He denies palpitations he denies tachycardia. He additionally denies chest pain, lightheadedness, dizziness, edema or syncope. He is abstaining from alcohol and caffeine. Previous visit  Lorin Settler states that he is feeling well. He has noticed palpitations approximate 3 days post discharge. He did have an episode of shortness of breath which was transient during the palpitations. Lasting only a few seconds. Has also noted occasional dizziness. No syncope or near syncope    Previous visit on 10/14/2020  Patient is a 69 yr old male with hx of atrial fibrillation referred here for management of atrial fibrillation. Pt states he feels fluttering and skipping when laying down. Tiredness and weakness. Pt denies chest pain, shortness of breath, dizziness, and edema. Pt drinks two cups of coffee daily.     Past Medical History:   Diagnosis Date    Atrial fibrillation [facility])     follows with Dr Maylin Rivero    CHF (congestive heart failure) ([facility] [address] 03/2020    Enlarged prostate     H/O echocardiogram 05/21/2020    Mild prolapse anterior mitral valve leaflet,EF20%,no thrombus    H/O percutaneous left heart catheterization     [place] (hard of hearing)     no hearing aides    Hypotension     Indwelling catheter present on admission     \"had catheter in since March 2020\"    Iron deficiency anemia     PONV (postoperative nausea and vomiting)     \"sick in 1981 but did fine after that with other surgeries\"    S/P ablation of atrial fibrillation 12/14/2020    atrial fibrillation ablation with Dr. Rober Ledezma. UTI (urinary tract infection)     week 7/20/2020- was on antibiotic- had repeat urine test 7/27/2020 but never heard the results\"    Wears glasses      Past Surgical History:   Procedure Laterality Date    ARM SURGERY Left 1981    \"have plate and screws still in place\"    CARDIAC CATHETERIZATION      per old chart had cath 3/2020    CARDIOVERSION  05/2020    \"had cardioversion- worked for some time but the atrial fib is back\"    CARDIOVERSION N/A 07/15/2021    Successful SEEMA/CV performed by Dr. Rober Ledezma. COLONOSCOPY  2010    EYE SURGERY  2015    left eye cataract ext     TURP N/A 08/06/2020    CYSTOSCOPY W/BIPOLAR TURP REMOVAL OF BLADDER STONES performed by Stephanie Gamino MD at [facility] OR     Family History   Problem Relation Age of Onset    Cancer Mother         thyroid cancer    Cancer Father         lymphoma     Social History     Tobacco Use    Smoking status: Never    Smokeless tobacco: Never   Substance Use Topics    Alcohol use: Not on file     Comment: \"drink 1-2 beers per day\"        Review of Systems   Constitution: Negative for diaphoresis and malaise/positive for fatigue  HENT: Negative for congestion, hoarse voice and tinnitus. Eyes: Negative for pain and visual disturbance. Cardiovascular: Negative for chest pain, positive dyspnea on exertion, positive irregular heartbeat, negative leg swelling, negative near-syncope, positive for palpitation  Respiratory: Negative for cough and shortness of breath.     Endocrine: Negative for cold intolerance and heat intolerance. Hematologic/Lymphatic: Negative for adenopathy and bleeding problem. Skin: Negative for color change and poor wound healing. Musculoskeletal: Positive for arthritis. Negative for muscle weakness. Gastrointestinal: Negative for abdominal pain and melena. Genitourinary: Negative for flank pain and hematuria. Neurological: Positive for dizziness and light-headedness. Psychiatric/Behavioral: Negative for depression. The patient is not nervous/anxious. /62 (Site: Right Upper Arm, Position: Sitting, Cuff Size: Large Adult)   Pulse 57   Ht 6' 4\" (1.93 m)   Wt 206 lb 12.8 oz (93.8 kg)   BMI 25.17 kg/m²   Wt Readings from Last 3 Encounters:   11/18/22 206 lb 12.8 oz (93.8 kg)   05/27/22 200 lb 9.6 oz (91 kg)   03/15/22 196 lb 9.6 oz (89.2 kg)       Current Outpatient Medications   Medication Sig Dispense Refill    amiodarone (CORDARONE) 200 MG tablet Take 1 tablet by mouth once daily 30 tablet 3    pantoprazole (PROTONIX) 40 MG tablet Take 1 tablet by mouth daily 90 tablet 3    metoprolol succinate (TOPROL XL) 25 MG extended release tablet Take 1 tablet by mouth daily 60 tablet 3    lisinopril (PRINIVIL;ZESTRIL) 2.5 MG tablet Take 1 tablet by mouth daily 90 tablet 3    apixaban (ELIQUIS) 5 MG TABS tablet Take 1 tablet by mouth 2 times daily 60 tablet 11    ibuprofen (ADVIL;MOTRIN) 200 MG tablet Take 400 mg by mouth PRN headache       No current facility-administered medications for this visit. Physical Exam  Constitutional:       Appearance: Normal appearance. He is not ill-appearing or diaphoretic. HENT:      Head: Normocephalic and atraumatic. Right Ear: External ear normal.      Left Ear: External ear normal.      Nose: Nose normal.      Mouth/Throat:      Mouth: Mucous membranes are moist.      Pharynx: Oropharynx is clear. Eyes:      General:         Right eye: No discharge. Left eye: No discharge.       Pupils: Pupils are equal, round, and reactive to light. Neck:      Musculoskeletal: Normal range of motion and neck supple. Vascular: No carotid bruit. Cardiovascular:      Rate and Rhythm: Regular rate and rhythm (tachycardia)     Pulses: Normal pulses. Heart sounds: No murmur. Pulmonary:      Effort: Pulmonary effort is normal.      Breath sounds: Normal breath sounds. No wheezing or rhonchi. Abdominal:      General: There is no distension. Palpations: Abdomen is soft. Tenderness: There is no abdominal tenderness. Musculoskeletal: Normal range of motion. Right lower leg: No edema. Left lower leg: No edema. Skin:     General: Skin is warm. Capillary Refill: Capillary refill takes less than 2 seconds. Neurological:      General: No focal deficit present. Mental Status: He is alert and oriented to person, place, and time. Psychiatric:         Mood and Affect: Mood normal.         Behavior: Behavior normal.       DATA:  Lab Results   Component Value Date    TROPONINT 0.010 (H) 03/07/2020     BNP:    Lab Results   Component Value Date    PROBNP 10,332 (H) 03/06/2020     PT/INR:  No results found for: PTINR  No results found for: LABA1C  Lab Results   Component Value Date    CHOL 150 03/10/2020    TRIG 83 03/10/2020    HDL 44 03/10/2020    LDLDIRECT 99 03/10/2020     Lab Results   Component Value Date    ALT 13 03/15/2022    AST 19 03/15/2022     TSH: No results found for: TSH    Echo    12/2020     Summary   This is a limited study s/p ablation to r/o pericardial effusion. Left ventricular systolic function is normal LVEF 50%   Mild to moderate mitral regurgitation. There is a trivial pericardial effusion.          Impression and recommendations:     Atrial tachycardia paroxysmal ON METOPROLOL, AMIODARONE  Status post ablation of Persistent atrial fibrillation - PV isolation, CTI ablation, posterior wall isolation - ON ELIQUIS  HTN    Patient over all feels very good   Off and on he has palpitations at night - he is not sure if its from digestive system but he is up for 1 hr  Patient denies chest pain, shortness of breath    Recommended event monitor to assess the patient if he has any any arrhythmia. Patient reports that he is not sure if he wants to get another ablation even if he has any arrhythmia. I told him I want to make sure if he is having any tachycardia episodes if he is in arrhythmia. Because last time he had this episodes he had congestive heart failure. And that was the reason why he was admitted. Patient voiced understanding    Also will get labs for amiodarone.   Patient actually feels better on amiodarone and he feels that he has more energy but he understands the long-term risks of amiodarone         Shanita Garland MD on 11/18/2022 at 10:27 AM

## 2022-11-21 RX ORDER — METOPROLOL SUCCINATE 25 MG/1
25 TABLET, EXTENDED RELEASE ORAL DAILY
Qty: 60 TABLET | Refills: 5 | Status: SHIPPED | OUTPATIENT
Start: 2022-11-21

## 2022-11-21 RX ORDER — METOPROLOL SUCCINATE 25 MG/1
25 TABLET, EXTENDED RELEASE ORAL DAILY
Qty: 60 TABLET | Refills: 5 | OUTPATIENT
Start: 2022-11-21

## 2022-11-22 ENCOUNTER — TELEPHONE (OUTPATIENT)
Dept: CARDIOLOGY CLINIC | Age: 72
End: 2022-11-22

## 2022-11-22 DIAGNOSIS — I48.91 ATRIAL FIBRILLATION WITH RVR (HCC): Primary | ICD-10-CM

## 2022-11-22 NOTE — TELEPHONE ENCOUNTER
Dee Dee Chavez / Patient called he has been having vertigo for over a week , he is asking to have his labs drawn to check his liver functions sooner than January due to the Amiodarone he is taking

## 2022-11-28 ENCOUNTER — NURSE ONLY (OUTPATIENT)
Dept: CARDIOLOGY CLINIC | Age: 72
End: 2022-11-28
Payer: MEDICARE

## 2022-11-28 DIAGNOSIS — Z79.899 ON AMIODARONE THERAPY: Primary | ICD-10-CM

## 2022-11-28 PROCEDURE — 36415 COLL VENOUS BLD VENIPUNCTURE: CPT | Performed by: INTERNAL MEDICINE

## 2022-11-29 LAB
A/G RATIO: 1.9 (ref 1.1–2.2)
ALBUMIN SERPL-MCNC: 4.1 G/DL (ref 3.4–5)
ALP BLD-CCNC: 49 U/L (ref 40–129)
ALT SERPL-CCNC: 20 U/L (ref 10–40)
ANION GAP SERPL CALCULATED.3IONS-SCNC: 15 MMOL/L (ref 3–16)
AST SERPL-CCNC: 21 U/L (ref 15–37)
BILIRUB SERPL-MCNC: 0.3 MG/DL (ref 0–1)
BUN BLDV-MCNC: 18 MG/DL (ref 7–20)
CALCIUM SERPL-MCNC: 8.9 MG/DL (ref 8.3–10.6)
CHLORIDE BLD-SCNC: 103 MMOL/L (ref 99–110)
CO2: 21 MMOL/L (ref 21–32)
CREAT SERPL-MCNC: 1.3 MG/DL (ref 0.8–1.3)
GFR SERPL CREATININE-BSD FRML MDRD: 58 ML/MIN/{1.73_M2}
GLUCOSE BLD-MCNC: 96 MG/DL (ref 70–99)
POTASSIUM SERPL-SCNC: 4.7 MMOL/L (ref 3.5–5.1)
SODIUM BLD-SCNC: 139 MMOL/L (ref 136–145)
TOTAL PROTEIN: 6.3 G/DL (ref 6.4–8.2)
TSH SERPL DL<=0.05 MIU/L-ACNC: 6.46 UIU/ML (ref 0.27–4.2)

## 2023-01-06 RX ORDER — AMIODARONE HYDROCHLORIDE 200 MG/1
TABLET ORAL
Qty: 30 TABLET | Refills: 0 | Status: SHIPPED | OUTPATIENT
Start: 2023-01-06

## 2023-01-20 ENCOUNTER — OFFICE VISIT (OUTPATIENT)
Dept: CARDIOLOGY CLINIC | Age: 73
End: 2023-01-20
Payer: MEDICARE

## 2023-01-20 VITALS
SYSTOLIC BLOOD PRESSURE: 118 MMHG | BODY MASS INDEX: 25.52 KG/M2 | DIASTOLIC BLOOD PRESSURE: 78 MMHG | HEIGHT: 76 IN | HEART RATE: 56 BPM | WEIGHT: 209.6 LBS

## 2023-01-20 DIAGNOSIS — I47.1 PAT (PAROXYSMAL ATRIAL TACHYCARDIA) (HCC): Primary | ICD-10-CM

## 2023-01-20 PROCEDURE — 99213 OFFICE O/P EST LOW 20 MIN: CPT | Performed by: INTERNAL MEDICINE

## 2023-01-20 PROCEDURE — G8484 FLU IMMUNIZE NO ADMIN: HCPCS | Performed by: INTERNAL MEDICINE

## 2023-01-20 PROCEDURE — G8427 DOCREV CUR MEDS BY ELIG CLIN: HCPCS | Performed by: INTERNAL MEDICINE

## 2023-01-20 PROCEDURE — 3074F SYST BP LT 130 MM HG: CPT | Performed by: INTERNAL MEDICINE

## 2023-01-20 PROCEDURE — G8417 CALC BMI ABV UP PARAM F/U: HCPCS | Performed by: INTERNAL MEDICINE

## 2023-01-20 PROCEDURE — 1036F TOBACCO NON-USER: CPT | Performed by: INTERNAL MEDICINE

## 2023-01-20 PROCEDURE — 3017F COLORECTAL CA SCREEN DOC REV: CPT | Performed by: INTERNAL MEDICINE

## 2023-01-20 PROCEDURE — 1123F ACP DISCUSS/DSCN MKR DOCD: CPT | Performed by: INTERNAL MEDICINE

## 2023-01-20 PROCEDURE — 93000 ELECTROCARDIOGRAM COMPLETE: CPT | Performed by: INTERNAL MEDICINE

## 2023-01-20 PROCEDURE — 3078F DIAST BP <80 MM HG: CPT | Performed by: INTERNAL MEDICINE

## 2023-01-20 NOTE — PROGRESS NOTES
TEST:           BASELINE       3 MOS       6 MOS       12 MOS       PRN    EKG                     [x]                  []                []                  []                PFT                      [x]                                                                             []   CXR                     [x]                                                                             []   THYROID            []                                     [x]                  []                LFT                      []                                     [x]                  []                EYE EXAM          []                                                                             [x]

## 2023-01-20 NOTE — PROGRESS NOTES
Electrophysiology FU NOTE    Reason for consultation: fluttering      Chief complaint:  6 MONTH FOLLOW UP, PALPITATIONS    Referring physician: Dr. Eagle Ruiz     Primary care physician: Sammie Elkins MD     History of Present Illness: This visit (1/20/2023)      Chief Complaint   Patient presents with    Follow-up     Pt denies cardiac symptoms  Pt does not smoke  Pt drinks a beer every other night    Results     Monitor results           Previous visit: (11/18/2022)      Chief Complaint   Patient presents with    6 Month Follow-Up     No other complaints    Palpitations     Feels them at night but over all much better. More like skip beating     Previous visit 7/14/2021  Patient is here today for follow-up on tachycardia with associated fatigue. Patient was seen 2 weeks ago and was started on Cardizem. Patient reports that he did not start the Cardizem because he was feeling fatigued. He states that he continues to have fatigue and palpitations that are intermittent in nature. He states the dizziness has resolved. He reports he will have these symptoms both at rest and with ambulation. He reports that symptoms are unchanged from previous office visit. He reports that he has stopped drinking 1 beer a day. He is taking his medications as prescribed. Previous visit 6/30/2021  Patient is here today with complaints of dizziness, fatigue, palpitations. He reports the symptoms started about 2 weeks ago. He states they are intermittent in nature. He reports he will have these symptoms both at rest and with ambulation. He denies alleviating factors. He states that he will wake up in the middle of the night having palpitations. He states that it is a pounding sensation that lasts 5 to 15 minutes. He reports this is happening every night. He will have associated chest tightness shortness of breath and lightheadedness. Patient is taking his medications as prescribed.   He states that he recently started drinking 1 beer a day.    Previous visit 5/4/2021  Patient here for 4-month follow-up status post ablation of atrial fibrillation.  He reports that he is feeling well.  He denies episodes of palpitations or tachycardia.  He additionally denies chest pain, lightheadedness, dizziness, shortness of breath edema or syncope.  He continues to not drink alcohol, caffeine or smoke cigarettes.  He is here today to go over his 30-day event monitor as he would like to come off of his Tikosyn.    Previous visit 3/18/2021  Patient is here today for 3-month follow-up status post ablation of atrial fibrillation.  He reports that he continues to have episodes of palpitations in the evening time and when in bed.  He denies tachycardia.  He denies chest pain, lightheadedness, shortness of breath, dizziness, edema or syncope.  He continues to refrain from caffeine, alcohol and smoking.    Previous visit 1/14/2021  Patient is here today for 1 month follow-up status post ablation of atrial fibrillation.  Patient reports he has been feeling well.  He states that he has had a few episodes where he will roll over in bed and feel his heart beat.  He denies palpitations he denies tachycardia.  He additionally denies chest pain, lightheadedness, dizziness, edema or syncope.  He is abstaining from alcohol and caffeine.    Previous visit  Dave states that he is feeling well.  He has noticed palpitations approximate 3 days post discharge.  He did have an episode of shortness of breath which was transient during the palpitations.  Lasting only a few seconds.  Has also noted occasional dizziness.  No syncope or near syncope    Previous visit on 10/14/2020  Patient is a 70 yr old male with hx of atrial fibrillation referred here for management of atrial fibrillation.   Pt states he feels fluttering and skipping when laying down.  Tiredness and weakness.  Pt denies chest pain, shortness of breath, dizziness, and edema. Pt drinks two cups of coffee  daily. Past Medical History:   Diagnosis Date    Atrial fibrillation [facility])     follows with Dr Cindy Griffin    CHF (congestive heart failure) ([facility] [address] 03/2020    Enlarged prostate     H/O echocardiogram 05/21/2020    Mild prolapse anterior mitral valve leaflet,EF20%,no thrombus    H/O percutaneous left heart catheterization     [place] (hard of hearing)     no hearing aides    Hypotension     Indwelling catheter present on admission     \"had catheter in since March 2020\"    Iron deficiency anemia     PONV (postoperative nausea and vomiting)     \"sick in 1981 but did fine after that with other surgeries\"    S/P ablation of atrial fibrillation 12/14/2020    atrial fibrillation ablation with Dr. Samina Julien. UTI (urinary tract infection)     week 7/20/2020- was on antibiotic- had repeat urine test 7/27/2020 but never heard the results\"    Wears glasses      Past Surgical History:   Procedure Laterality Date    ARM SURGERY Left 1981    \"have plate and screws still in place\"    CARDIAC CATHETERIZATION      per old chart had cath 3/2020    CARDIOVERSION  05/2020    \"had cardioversion- worked for some time but the atrial fib is back\"    CARDIOVERSION N/A 07/15/2021    Successful SEEMA/CV performed by Dr. Samina Julien. COLONOSCOPY  2010    EYE SURGERY  2015    left eye cataract ext     TURP N/A 08/06/2020    CYSTOSCOPY W/BIPOLAR TURP REMOVAL OF BLADDER STONES performed by Nola Laurent MD at [facility] OR     Family History   Problem Relation Age of Onset    Cancer Mother         thyroid cancer    Cancer Father         lymphoma     Social History     Tobacco Use    Smoking status: Never    Smokeless tobacco: Never   Substance Use Topics    Alcohol use: Not on file     Comment: \"drink 1-2 beers per day\"        Review of Systems   Constitution: Negative for diaphoresis and malaise/positive for fatigue  HENT: Negative for congestion, hoarse voice and tinnitus. Eyes: Negative for pain and visual disturbance.    Cardiovascular: Negative for chest pain, positive dyspnea on exertion, positive irregular heartbeat, negative leg swelling, negative near-syncope, positive for palpitation  Respiratory: Negative for cough and shortness of breath. Endocrine: Negative for cold intolerance and heat intolerance. Hematologic/Lymphatic: Negative for adenopathy and bleeding problem. Skin: Negative for color change and poor wound healing. Musculoskeletal: Positive for arthritis. Negative for muscle weakness. Gastrointestinal: Negative for abdominal pain and melena. Genitourinary: Negative for flank pain and hematuria. Neurological: Positive for dizziness and light-headedness. Psychiatric/Behavioral: Negative for depression. The patient is not nervous/anxious. /78 (Site: Left Upper Arm, Position: Sitting, Cuff Size: Large Adult)   Pulse 56   Ht 6' 4\" (1.93 m)   Wt 209 lb 9.6 oz (95.1 kg)   BMI 25.51 kg/m²   Wt Readings from Last 3 Encounters:   01/20/23 209 lb 9.6 oz (95.1 kg)   11/18/22 206 lb 12.8 oz (93.8 kg)   05/27/22 200 lb 9.6 oz (91 kg)       Current Outpatient Medications   Medication Sig Dispense Refill    amiodarone (CORDARONE) 200 MG tablet Take 1 tablet by mouth once daily 30 tablet 0    metoprolol succinate (TOPROL XL) 25 MG extended release tablet Take 1 tablet by mouth daily 60 tablet 5    pantoprazole (PROTONIX) 40 MG tablet Take 1 tablet by mouth daily 90 tablet 3    lisinopril (PRINIVIL;ZESTRIL) 2.5 MG tablet Take 1 tablet by mouth daily 90 tablet 3    apixaban (ELIQUIS) 5 MG TABS tablet Take 1 tablet by mouth 2 times daily 60 tablet 11    ibuprofen (ADVIL;MOTRIN) 200 MG tablet Take 400 mg by mouth PRN headache       No current facility-administered medications for this visit. Physical Exam  Constitutional:       Appearance: Normal appearance. He is not ill-appearing or diaphoretic. HENT:      Head: Normocephalic and atraumatic.       Right Ear: External ear normal.      Left Ear: External ear normal.      Nose: Nose normal.      Mouth/Throat:      Mouth: Mucous membranes are moist.      Pharynx: Oropharynx is clear. Eyes:      General:         Right eye: No discharge. Left eye: No discharge. Pupils: Pupils are equal, round, and reactive to light. Neck:      Musculoskeletal: Normal range of motion and neck supple. Vascular: No carotid bruit. Cardiovascular:      Rate and Rhythm: Regular rate and rhythm (tachycardia)     Pulses: Normal pulses. Heart sounds: No murmur. Pulmonary:      Effort: Pulmonary effort is normal.      Breath sounds: Normal breath sounds. No wheezing or rhonchi. Abdominal:      General: There is no distension. Palpations: Abdomen is soft. Tenderness: There is no abdominal tenderness. Musculoskeletal: Normal range of motion. Right lower leg: No edema. Left lower leg: No edema. Skin:     General: Skin is warm. Capillary Refill: Capillary refill takes less than 2 seconds. Neurological:      General: No focal deficit present. Mental Status: He is alert and oriented to person, place, and time. Psychiatric:         Mood and Affect: Mood normal.         Behavior: Behavior normal.       DATA:  Lab Results   Component Value Date    TROPONINT 0.010 (H) 03/07/2020     BNP:    Lab Results   Component Value Date    PROBNP 10,332 (H) 03/06/2020     PT/INR:  No results found for: PTINR  No results found for: LABA1C  Lab Results   Component Value Date    CHOL 150 03/10/2020    TRIG 83 03/10/2020    HDL 44 03/10/2020    LDLDIRECT 99 03/10/2020     Lab Results   Component Value Date    ALT 20 11/28/2022    AST 21 11/28/2022     TSH:   Lab Results   Component Value Date    TSH 6.46 (H) 11/28/2022       Echo    12/2020     Summary   This is a limited study s/p ablation to r/o pericardial effusion. Left ventricular systolic function is normal LVEF 50%   Mild to moderate mitral regurgitation. There is a trivial pericardial effusion.      EKG - SINUS bradycardia, first degree AV block    Impression and recommendations:     Atrial tachycardia paroxysmal ON METOPROLOL, AMIODARONE  Status post ablation of Persistent atrial fibrillation - PV isolation, CTI ablation, posterior wall isolation - ON ELIQUIS  HTN    Patient having short atrial run on event monitor  Over all doing well  Discussed about amiodarone risks and wants to try every other day is reasonable and reassess  Rate controlled mostly    Needs eye examination  TSH was 6 but while on amiodarone we do not treat thyroid unless its above 10 so with amiodarone decrease dose to every other day may be Tsh will be stable    FU 4 months and we will recheck labs again at that time    Discussed to avoid alcohol and cafffeine         Louise Bonds MD on 1/20/2023 at 10:07 AM

## 2023-01-20 NOTE — PATIENT INSTRUCTIONS
Please be informed that if you contact our office outside of normal business hours the physician on call cannot help with any scheduling or rescheduling issues, procedure instruction questions or any type of medication issue. We advise you for any urgent/emergency that you go to the nearest emergency room! PLEASE CALL OUR OFFICE DURING NORMAL BUSINESS HOURS    Monday - Friday   8 am to 5 pm    [address]: [phone]    [place]:  [phone]    **It is YOUR responsibilty to bring medication bottles and/or updated medication list to [address] [place] [address]. This will allow us to better serve you and all your healthcare needs**    Thank you for allowing us to care for you today! We want to ensure we can follow your treatment plan and we strive to give you the best outcomes and experience possible. If you ever have a life threatening emergency and call 911 - for an ambulance (EMS)   Our providers can only care for you at:   [facility] or [facility]. Even if you have someone take you or you drive yourself we can only care for you in a [address] [facility] facility. Our providers are not setup at the other healthcare locations!

## 2023-02-22 RX ORDER — LISINOPRIL 2.5 MG/1
TABLET ORAL
Qty: 90 TABLET | Refills: 0 | Status: SHIPPED | OUTPATIENT
Start: 2023-02-22

## 2023-02-22 RX ORDER — AMIODARONE HYDROCHLORIDE 200 MG/1
TABLET ORAL
Qty: 30 TABLET | Refills: 0 | Status: SHIPPED | OUTPATIENT
Start: 2023-02-22

## 2023-03-18 ENCOUNTER — HOSPITAL ENCOUNTER (EMERGENCY)
Age: 73
Discharge: HOME | End: 2023-03-18

## 2023-03-18 VITALS
TEMPERATURE: 97.9 F | SYSTOLIC BLOOD PRESSURE: 165 MMHG | DIASTOLIC BLOOD PRESSURE: 90 MMHG | WEIGHT: 209 LBS | BODY MASS INDEX: 25.45 KG/M2 | HEART RATE: 63 BPM | OXYGEN SATURATION: 100 % | HEIGHT: 76 IN | RESPIRATION RATE: 16 BRPM

## 2023-03-18 ASSESSMENT — PAIN - FUNCTIONAL ASSESSMENT: PAIN_FUNCTIONAL_ASSESSMENT: NONE - DENIES PAIN

## 2023-03-19 NOTE — ACP (ADVANCE CARE PLANNING)
Patient has a current Medical POA loaded into [employer]/in chart. Patient has chosen his son, Hina Urias,\" although patients' sons' information in not in patient' contact lists. Son would be primary Healthcare Decision Maker as well as MPOA. Patient also chose his brother, Ida Wiseman,\" as his [facility] and his 1st Alternate Agent for his MPOA.

## 2023-03-19 NOTE — ED NOTES
Patient to ED triage desk to inform staff that he would like to leave and go home to take care of his dogs.       Shade Mcdonough RN  03/18/23 [id number]

## 2023-03-19 NOTE — ED NOTES
Patient requesting to wait in ED lobby at this time. Will notify ED provider.       aJzmyne Gastelum RN  03/18/23 1182

## 2023-04-21 RX ORDER — PANTOPRAZOLE SODIUM 40 MG/1
40 TABLET, DELAYED RELEASE ORAL DAILY
Qty: 90 TABLET | Refills: 3 | Status: SHIPPED | OUTPATIENT
Start: 2023-04-21

## 2023-04-24 RX ORDER — AMIODARONE HYDROCHLORIDE 200 MG/1
TABLET ORAL
Qty: 30 TABLET | Refills: 0 | Status: SHIPPED | OUTPATIENT
Start: 2023-04-24

## 2023-04-24 RX ORDER — APIXABAN 5 MG/1
TABLET, FILM COATED ORAL
Qty: 60 TABLET | Refills: 0 | Status: SHIPPED | OUTPATIENT
Start: 2023-04-24

## 2023-05-19 ENCOUNTER — OFFICE VISIT (OUTPATIENT)
Dept: CARDIOLOGY CLINIC | Age: 73
End: 2023-05-19
Payer: MEDICARE

## 2023-05-19 VITALS
DIASTOLIC BLOOD PRESSURE: 78 MMHG | WEIGHT: 201 LBS | HEART RATE: 56 BPM | SYSTOLIC BLOOD PRESSURE: 122 MMHG | HEIGHT: 76 IN | BODY MASS INDEX: 24.48 KG/M2

## 2023-05-19 DIAGNOSIS — I47.1 PAT (PAROXYSMAL ATRIAL TACHYCARDIA) (HCC): Primary | ICD-10-CM

## 2023-05-19 DIAGNOSIS — Z79.899 ON AMIODARONE THERAPY: ICD-10-CM

## 2023-05-19 PROCEDURE — 1036F TOBACCO NON-USER: CPT | Performed by: INTERNAL MEDICINE

## 2023-05-19 PROCEDURE — 93000 ELECTROCARDIOGRAM COMPLETE: CPT | Performed by: INTERNAL MEDICINE

## 2023-05-19 PROCEDURE — 3074F SYST BP LT 130 MM HG: CPT | Performed by: INTERNAL MEDICINE

## 2023-05-19 PROCEDURE — 99213 OFFICE O/P EST LOW 20 MIN: CPT | Performed by: INTERNAL MEDICINE

## 2023-05-19 PROCEDURE — G8427 DOCREV CUR MEDS BY ELIG CLIN: HCPCS | Performed by: INTERNAL MEDICINE

## 2023-05-19 PROCEDURE — 3017F COLORECTAL CA SCREEN DOC REV: CPT | Performed by: INTERNAL MEDICINE

## 2023-05-19 PROCEDURE — 3078F DIAST BP <80 MM HG: CPT | Performed by: INTERNAL MEDICINE

## 2023-05-19 PROCEDURE — 1123F ACP DISCUSS/DSCN MKR DOCD: CPT | Performed by: INTERNAL MEDICINE

## 2023-05-19 PROCEDURE — G8420 CALC BMI NORM PARAMETERS: HCPCS | Performed by: INTERNAL MEDICINE

## 2023-05-19 NOTE — PROGRESS NOTES
Persistent atrial fibrillation - PV isolation, CTI ablation, posterior wall isolation - on eliquis. HTN    Patient feels very good now and he has no concerns  He has actually wants to come off amiodarone now and he is actually weaning himself off which is reasonable  I will check his TSH and labs   Stop amiodarone  Continue metoprolol  FU in 6 months or earlier with symptoms  He is playing golf.      Discussed to avoid alcohol and cafffeine         aFbio Lynne MD on 5/19/2023 at 9:23 AM

## 2023-05-19 NOTE — PATIENT INSTRUCTIONS
Please be informed that if you contact our office outside of normal business hours the physician on call cannot help with any scheduling or rescheduling issues, procedure instruction questions or any type of medication issue. We advise you for any urgent/emergency that you go to the nearest emergency room! PLEASE CALL OUR OFFICE DURING NORMAL BUSINESS HOURS    Monday - Friday   8 am to 5 pm    [address]: [phone]    [place]:  [phone]     **It is YOUR responsibilty to bring medication bottles and/or updated medication list to [address] [place] [address]. This will allow us to better serve you and all your healthcare needs**     Thank you for allowing us to care for you today! We want to ensure we can follow your treatment plan and we strive to give you the best outcomes and experience possible. If you ever have a life threatening emergency and call 911 - for an ambulance (EMS)   Our providers can only care for you at:   [facility] or [facility]. Even if you have someone take you or you drive yourself we can only care for you in a [facility] facility. Our providers are not setup at the other healthcare locations!

## 2023-06-22 RX ORDER — APIXABAN 5 MG/1
TABLET, FILM COATED ORAL
Qty: 60 TABLET | Refills: 3 | Status: SHIPPED | OUTPATIENT
Start: 2023-06-22

## 2023-07-13 RX ORDER — LISINOPRIL 2.5 MG/1
TABLET ORAL
Qty: 90 TABLET | Refills: 0 | Status: SHIPPED | OUTPATIENT
Start: 2023-07-13

## 2023-07-14 RX ORDER — LISINOPRIL 2.5 MG/1
TABLET ORAL
Qty: 90 TABLET | Refills: 0 | OUTPATIENT
Start: 2023-07-14

## 2023-07-21 ENCOUNTER — NURSE ONLY (OUTPATIENT)
Dept: CARDIOLOGY CLINIC | Age: 73
End: 2023-07-21

## 2023-07-21 ENCOUNTER — OFFICE VISIT (OUTPATIENT)
Dept: CARDIOLOGY CLINIC | Age: 73
End: 2023-07-21
Payer: MEDICARE

## 2023-07-21 VITALS
HEART RATE: 98 BPM | HEIGHT: 76 IN | BODY MASS INDEX: 24.26 KG/M2 | DIASTOLIC BLOOD PRESSURE: 82 MMHG | SYSTOLIC BLOOD PRESSURE: 122 MMHG | WEIGHT: 199.2 LBS

## 2023-07-21 DIAGNOSIS — I10 ESSENTIAL HYPERTENSION: ICD-10-CM

## 2023-07-21 DIAGNOSIS — I48.19 HYPERCOAGULABLE STATE DUE TO PERSISTENT ATRIAL FIBRILLATION (HCC): ICD-10-CM

## 2023-07-21 DIAGNOSIS — I48.19 PERSISTENT ATRIAL FIBRILLATION (HCC): Primary | ICD-10-CM

## 2023-07-21 DIAGNOSIS — I47.1 PAT (PAROXYSMAL ATRIAL TACHYCARDIA) (HCC): Primary | ICD-10-CM

## 2023-07-21 DIAGNOSIS — D68.69 HYPERCOAGULABLE STATE DUE TO PERSISTENT ATRIAL FIBRILLATION (HCC): ICD-10-CM

## 2023-07-21 DIAGNOSIS — Z79.899 ON AMIODARONE THERAPY: ICD-10-CM

## 2023-07-21 DIAGNOSIS — I47.1 PAT (PAROXYSMAL ATRIAL TACHYCARDIA) (HCC): ICD-10-CM

## 2023-07-21 LAB
ALBUMIN SERPL-MCNC: 4.4 G/DL (ref 3.4–5)
ALBUMIN/GLOB SERPL: 1.9 {RATIO} (ref 1.1–2.2)
ALP SERPL-CCNC: 55 U/L (ref 40–129)
ALT SERPL-CCNC: 16 U/L (ref 10–40)
ANION GAP SERPL CALCULATED.3IONS-SCNC: 13 MMOL/L (ref 3–16)
AST SERPL-CCNC: 22 U/L (ref 15–37)
BASOPHILS # BLD: 0 K/UL (ref 0–0.2)
BASOPHILS NFR BLD: 0.3 %
BILIRUB DIRECT SERPL-MCNC: <0.2 MG/DL (ref 0–0.3)
BILIRUB INDIRECT SERPL-MCNC: NORMAL MG/DL (ref 0–1)
BILIRUB SERPL-MCNC: 0.4 MG/DL (ref 0–1)
BUN SERPL-MCNC: 20 MG/DL (ref 7–20)
CALCIUM SERPL-MCNC: 9.5 MG/DL (ref 8.3–10.6)
CHLORIDE SERPL-SCNC: 102 MMOL/L (ref 99–110)
CO2 SERPL-SCNC: 22 MMOL/L (ref 21–32)
CREAT SERPL-MCNC: 1.3 MG/DL (ref 0.8–1.3)
DEPRECATED RDW RBC AUTO: 14.1 % (ref 12.4–15.4)
EOSINOPHIL # BLD: 0 K/UL (ref 0–0.6)
EOSINOPHIL NFR BLD: 0.7 %
GFR SERPLBLD CREATININE-BSD FMLA CKD-EPI: 58 ML/MIN/{1.73_M2}
GLUCOSE SERPL-MCNC: 157 MG/DL (ref 70–99)
HCT VFR BLD AUTO: 39.4 % (ref 40.5–52.5)
HGB BLD-MCNC: 13.6 G/DL (ref 13.5–17.5)
LYMPHOCYTES # BLD: 1.8 K/UL (ref 1–5.1)
LYMPHOCYTES NFR BLD: 24.5 %
MCH RBC QN AUTO: 31.8 PG (ref 26–34)
MCHC RBC AUTO-ENTMCNC: 34.7 G/DL (ref 31–36)
MCV RBC AUTO: 91.8 FL (ref 80–100)
MONOCYTES # BLD: 0.3 K/UL (ref 0–1.3)
MONOCYTES NFR BLD: 4.7 %
NEUTROPHILS # BLD: 5 K/UL (ref 1.7–7.7)
NEUTROPHILS NFR BLD: 69.8 %
PLATELET # BLD AUTO: 165 K/UL (ref 135–450)
PMV BLD AUTO: 11.2 FL (ref 5–10.5)
POTASSIUM SERPL-SCNC: 4.5 MMOL/L (ref 3.5–5.1)
PROT SERPL-MCNC: 6.7 G/DL (ref 6.4–8.2)
RBC # BLD AUTO: 4.29 M/UL (ref 4.2–5.9)
SODIUM SERPL-SCNC: 137 MMOL/L (ref 136–145)
T4 FREE SERPL-MCNC: 1.3 NG/DL (ref 0.9–1.8)
TSH SERPL DL<=0.005 MIU/L-ACNC: 5.4 UIU/ML (ref 0.27–4.2)
WBC # BLD AUTO: 7.2 K/UL (ref 4–11)

## 2023-07-21 PROCEDURE — 93000 ELECTROCARDIOGRAM COMPLETE: CPT | Performed by: NURSE PRACTITIONER

## 2023-07-21 PROCEDURE — 1036F TOBACCO NON-USER: CPT | Performed by: NURSE PRACTITIONER

## 2023-07-21 PROCEDURE — 1123F ACP DISCUSS/DSCN MKR DOCD: CPT | Performed by: NURSE PRACTITIONER

## 2023-07-21 PROCEDURE — G8420 CALC BMI NORM PARAMETERS: HCPCS | Performed by: NURSE PRACTITIONER

## 2023-07-21 PROCEDURE — G8427 DOCREV CUR MEDS BY ELIG CLIN: HCPCS | Performed by: NURSE PRACTITIONER

## 2023-07-21 PROCEDURE — 99214 OFFICE O/P EST MOD 30 MIN: CPT | Performed by: NURSE PRACTITIONER

## 2023-07-21 PROCEDURE — 3079F DIAST BP 80-89 MM HG: CPT | Performed by: NURSE PRACTITIONER

## 2023-07-21 PROCEDURE — 3074F SYST BP LT 130 MM HG: CPT | Performed by: NURSE PRACTITIONER

## 2023-07-21 PROCEDURE — 3017F COLORECTAL CA SCREEN DOC REV: CPT | Performed by: NURSE PRACTITIONER

## 2023-07-21 RX ORDER — METOPROLOL SUCCINATE 25 MG/1
25 TABLET, EXTENDED RELEASE ORAL 2 TIMES DAILY
Qty: 60 TABLET | Refills: 5
Start: 2023-07-21

## 2023-07-21 NOTE — PATIENT INSTRUCTIONS
Please be informed that if you contact our office outside of normal business hours the physician on call cannot help with any scheduling or rescheduling issues, procedure instruction questions or any type of medication issue. We advise you for any urgent/emergency that you go to the nearest emergency room! PLEASE CALL OUR OFFICE DURING NORMAL BUSINESS HOURS    Monday - Friday   8 am to 5 pm    [place]: [address]: [phone]    [place]:  [phone]    **It is YOUR responsibilty to bring medication bottles and/or updated medication list to [address] [place] [address]. This will allow us to better serve you and all your healthcare needs**    Thank you for allowing us to care for you today! We want to ensure we can follow your treatment plan and we strive to give you the best outcomes and experience possible. If you ever have a life threatening emergency and call 911 - for an ambulance (EMS)   Our providers can only care for you at:   [facility] or [facility]. Even if you have someone take you or you drive yourself we can only care for you in a [address] [place] [address] facility. Our providers are not setup at the other healthcare locations!

## 2023-07-26 ENCOUNTER — TELEPHONE (OUTPATIENT)
Dept: CARDIOLOGY CLINIC | Age: 73
End: 2023-07-26

## 2023-07-26 NOTE — PROGRESS NOTES
Late entry  Attempted to return patient's call yesterday  Patient mailbox is full  Unable to leave message    Plan will be to restart amiodarone and schedule for cardioversion

## 2023-08-15 ENCOUNTER — OFFICE VISIT (OUTPATIENT)
Dept: CARDIOLOGY CLINIC | Age: 73
End: 2023-08-15
Payer: MEDICARE

## 2023-08-15 VITALS
DIASTOLIC BLOOD PRESSURE: 68 MMHG | HEIGHT: 76 IN | SYSTOLIC BLOOD PRESSURE: 106 MMHG | HEART RATE: 90 BPM | BODY MASS INDEX: 24.36 KG/M2 | WEIGHT: 200 LBS

## 2023-08-15 DIAGNOSIS — I47.1 ATRIAL TACHYCARDIA (HCC): ICD-10-CM

## 2023-08-15 DIAGNOSIS — I51.3 THROMBUS OF LEFT ATRIAL APPENDAGE: ICD-10-CM

## 2023-08-15 DIAGNOSIS — I48.19 PERSISTENT ATRIAL FIBRILLATION (HCC): Primary | ICD-10-CM

## 2023-08-15 DIAGNOSIS — D68.69 HYPERCOAGULABLE STATE DUE TO PERSISTENT ATRIAL FIBRILLATION (HCC): ICD-10-CM

## 2023-08-15 DIAGNOSIS — I48.19 HYPERCOAGULABLE STATE DUE TO PERSISTENT ATRIAL FIBRILLATION (HCC): ICD-10-CM

## 2023-08-15 DIAGNOSIS — I10 ESSENTIAL HYPERTENSION: ICD-10-CM

## 2023-08-15 PROCEDURE — 3074F SYST BP LT 130 MM HG: CPT | Performed by: NURSE PRACTITIONER

## 2023-08-15 PROCEDURE — G8427 DOCREV CUR MEDS BY ELIG CLIN: HCPCS | Performed by: NURSE PRACTITIONER

## 2023-08-15 PROCEDURE — G8420 CALC BMI NORM PARAMETERS: HCPCS | Performed by: NURSE PRACTITIONER

## 2023-08-15 PROCEDURE — 1123F ACP DISCUSS/DSCN MKR DOCD: CPT | Performed by: NURSE PRACTITIONER

## 2023-08-15 PROCEDURE — 3017F COLORECTAL CA SCREEN DOC REV: CPT | Performed by: NURSE PRACTITIONER

## 2023-08-15 PROCEDURE — 3078F DIAST BP <80 MM HG: CPT | Performed by: NURSE PRACTITIONER

## 2023-08-15 PROCEDURE — 99214 OFFICE O/P EST MOD 30 MIN: CPT | Performed by: NURSE PRACTITIONER

## 2023-08-15 PROCEDURE — 93000 ELECTROCARDIOGRAM COMPLETE: CPT | Performed by: NURSE PRACTITIONER

## 2023-08-15 PROCEDURE — 1036F TOBACCO NON-USER: CPT | Performed by: NURSE PRACTITIONER

## 2023-08-15 RX ORDER — AMIODARONE HYDROCHLORIDE 200 MG/1
200 TABLET ORAL DAILY
Qty: 30 TABLET | Refills: 3 | Status: SHIPPED | OUTPATIENT
Start: 2023-08-30

## 2023-08-15 RX ORDER — AMIODARONE HYDROCHLORIDE 200 MG/1
200 TABLET ORAL 2 TIMES DAILY
Qty: 28 TABLET | Refills: 0 | Status: SHIPPED | OUTPATIENT
Start: 2023-08-15 | End: 2023-08-29

## 2023-08-15 NOTE — PROGRESS NOTES
Electrophysiology FU NOTE       Chief complaint:  Here to discuss atrial fibrillation    Referring physician: Dr. Rosita Persaud     Primary care physician: Corina Agarwal MD     History of Present Illness: This visit 8/15/2023  Patient is here today for follow up on atrial fibrillation. He continues to have intermittent shortness of breath, palpitations and fatigue. He states he remains in atrial fibrillation since last office visit. He denies chest pain, lightheadedness, dizziness, edema or syncope. Previous visit 7/21/2023  Patient is here today for follow-up on atrial fibrillation. He reports for the last 2 weeks he has been having fatigue. He states he is concerned he is back in atrial fibrillation. Patient has weaned himself off of amiodarone since last office visit. He denies chest pain, palpitations, shortness of breath, lightheadedness, dizziness, edema or syncope. Previous visit (5/19/2023)  Chief Complaint   Patient presents with    Follow-up     Denies chest pain, shortness of breath, palpitations, syncope or presyncope, edema   Denies smoking  1 beer a day           Previous visit: (1/20/2023)      Chief Complaint   Patient presents with    Follow-up     Pt denies cardiac symptoms  Pt does not smoke  Pt drinks a beer every other night    Results     Monitor results           Previous visit: (11/18/2022)      Chief Complaint   Patient presents with    6 Month Follow-Up     No other complaints    Palpitations     Feels them at night but over all much better. More like skip beating     Previous visit 7/14/2021  Patient is here today for follow-up on tachycardia with associated fatigue. Patient was seen 2 weeks ago and was started on Cardizem. Patient reports that he did not start the Cardizem because he was feeling fatigued. He states that he continues to have fatigue and palpitations that are intermittent in nature. He states the dizziness has resolved.   He reports he will have these symptoms

## 2023-08-15 NOTE — PATIENT INSTRUCTIONS
Please be informed that if you contact our office outside of normal business hours the physician on call cannot help with any scheduling or rescheduling issues, procedure instruction questions or any type of medication issue. We advise you for any urgent/emergency that you go to the nearest emergency room! PLEASE CALL OUR OFFICE DURING NORMAL BUSINESS HOURS    Monday - Friday   8 am to 5 pm    [place]: [address]: [phone]    [place]:  [phone]    **It is YOUR responsibilty to bring medication bottles and/or updated medication list to [facility]. This will allow us to better serve you and all your healthcare needs**    Thank you for allowing us to care for you today! We want to ensure we can follow your treatment plan and we strive to give you the best outcomes and experience possible. If you ever have a life threatening emergency and call 911 - for an ambulance (EMS)   Our providers can only care for you at:   [facility] or [facility]. Even if you have someone take you or you drive yourself we can only care for you in a [facility] facility. Our providers are not setup at the other healthcare locations!

## 2023-08-16 SDOH — HEALTH STABILITY: PHYSICAL HEALTH: ON AVERAGE, HOW MANY MINUTES DO YOU ENGAGE IN EXERCISE AT THIS LEVEL?: 30 MIN

## 2023-08-16 SDOH — HEALTH STABILITY: PHYSICAL HEALTH: ON AVERAGE, HOW MANY DAYS PER WEEK DO YOU ENGAGE IN MODERATE TO STRENUOUS EXERCISE (LIKE A BRISK WALK)?: 6 DAYS

## 2023-08-17 ENCOUNTER — OFFICE VISIT (OUTPATIENT)
Dept: FAMILY MEDICINE CLINIC | Age: 73
End: 2023-08-17
Payer: MEDICARE

## 2023-08-17 VITALS
OXYGEN SATURATION: 94 % | HEART RATE: 69 BPM | TEMPERATURE: 98.1 F | SYSTOLIC BLOOD PRESSURE: 110 MMHG | DIASTOLIC BLOOD PRESSURE: 76 MMHG | BODY MASS INDEX: 24.62 KG/M2 | WEIGHT: 202.2 LBS | HEIGHT: 76 IN

## 2023-08-17 DIAGNOSIS — R73.9 ELEVATED BLOOD SUGAR: ICD-10-CM

## 2023-08-17 DIAGNOSIS — Z11.59 SCREENING FOR VIRAL DISEASE: ICD-10-CM

## 2023-08-17 DIAGNOSIS — R53.83 FATIGUE, UNSPECIFIED TYPE: ICD-10-CM

## 2023-08-17 DIAGNOSIS — N40.1 BPH WITH URINARY OBSTRUCTION: ICD-10-CM

## 2023-08-17 DIAGNOSIS — Z00.00 ENCOUNTER FOR WELL ADULT EXAM WITHOUT ABNORMAL FINDINGS: Primary | ICD-10-CM

## 2023-08-17 DIAGNOSIS — Z13.6 SCREENING FOR ISCHEMIC HEART DISEASE: ICD-10-CM

## 2023-08-17 DIAGNOSIS — Z00.00 INITIAL MEDICARE ANNUAL WELLNESS VISIT: ICD-10-CM

## 2023-08-17 DIAGNOSIS — Z12.12 SCREENING FOR COLORECTAL CANCER: ICD-10-CM

## 2023-08-17 DIAGNOSIS — I48.91 ATRIAL FIBRILLATION WITH RVR (HCC): ICD-10-CM

## 2023-08-17 DIAGNOSIS — Z12.11 SCREENING FOR COLORECTAL CANCER: ICD-10-CM

## 2023-08-17 DIAGNOSIS — N13.8 BPH WITH URINARY OBSTRUCTION: ICD-10-CM

## 2023-08-17 DIAGNOSIS — Z23 IMMUNIZATION DUE: ICD-10-CM

## 2023-08-17 DIAGNOSIS — Z86.39 HISTORY OF HYPERTHYROIDISM: ICD-10-CM

## 2023-08-17 PROCEDURE — 36415 COLL VENOUS BLD VENIPUNCTURE: CPT | Performed by: FAMILY MEDICINE

## 2023-08-17 PROCEDURE — 99387 INIT PM E/M NEW PAT 65+ YRS: CPT | Performed by: FAMILY MEDICINE

## 2023-08-17 PROCEDURE — 3074F SYST BP LT 130 MM HG: CPT | Performed by: FAMILY MEDICINE

## 2023-08-17 PROCEDURE — 3017F COLORECTAL CA SCREEN DOC REV: CPT | Performed by: FAMILY MEDICINE

## 2023-08-17 PROCEDURE — 3078F DIAST BP <80 MM HG: CPT | Performed by: FAMILY MEDICINE

## 2023-08-17 PROCEDURE — G0438 PPPS, INITIAL VISIT: HCPCS | Performed by: FAMILY MEDICINE

## 2023-08-17 ASSESSMENT — PATIENT HEALTH QUESTIONNAIRE - PHQ9
SUM OF ALL RESPONSES TO PHQ9 QUESTIONS 1 & 2: 0
SUM OF ALL RESPONSES TO PHQ QUESTIONS 1-9: 0
SUM OF ALL RESPONSES TO PHQ QUESTIONS 1-9: 0
1. LITTLE INTEREST OR PLEASURE IN DOING THINGS: 0
SUM OF ALL RESPONSES TO PHQ QUESTIONS 1-9: 0
SUM OF ALL RESPONSES TO PHQ QUESTIONS 1-9: 0
2. FEELING DOWN, DEPRESSED OR HOPELESS: 0

## 2023-08-17 ASSESSMENT — ENCOUNTER SYMPTOMS
DIARRHEA: 0
SHORTNESS OF BREATH: 0
BACK PAIN: 0
NAUSEA: 0
EYE PAIN: 0
CONSTIPATION: 1
COUGH: 1
APNEA: 1
ABDOMINAL PAIN: 0
TROUBLE SWALLOWING: 1
VOMITING: 0

## 2023-08-17 ASSESSMENT — LIFESTYLE VARIABLES
HOW MANY STANDARD DRINKS CONTAINING ALCOHOL DO YOU HAVE ON A TYPICAL DAY: 1 OR 2
HOW OFTEN DO YOU HAVE A DRINK CONTAINING ALCOHOL: 2-3 TIMES A WEEK

## 2023-08-17 NOTE — ASSESSMENT & PLAN NOTE
He has significant fatigue and I will check a CBC and CMP. He suspects that he has sleep apnea. Sleep apnea could be the reason for his fatigue. Will refer for sleep center evaluation.

## 2023-08-17 NOTE — ASSESSMENT & PLAN NOTE
Discussed possible therapy with tamsulosin, finasteride type medicine, Cialis. He does not feel the symptoms are bothersome enough to warrant a medication.

## 2023-08-17 NOTE — ASSESSMENT & PLAN NOTE
Cared for by cardiologist.  Currently taking amiodarone in hopes that it helps to convert rhythm to sinus

## 2023-08-17 NOTE — ASSESSMENT & PLAN NOTE
He reports a history of hyperthyroidism and also has a family history of hypothyroidism.   I will check a TSH and T4 which could be an explanation for his fatigue

## 2023-08-17 NOTE — PATIENT INSTRUCTIONS
[employer], [facility] disclaims any warranty or liability for your use of this information. Preventing Falls: Care Instructions    Talk to your doctor about the medicines you take. Ask if any of them increase the risk of falls and whether they can be changed or stopped. Try to exercise regularly. It can help improve your strength and balance. This can help lower your risk of falling. Practice fall safety and prevention. Wear low-heeled shoes that fit well and give your feet good support. Talk to your doctor if you have foot problems that make this hard. Carry a cellphone or wear a medical alert device that you can use to call for help. Use stepladders instead of chairs to reach high objects. Don't climb if you're at risk for falls. Ask for help, if needed. Wear the correct eyeglasses, if you need them. Make your home safer. Remove rugs, cords, clutter, and furniture from walkways. Keep your house well lit. Use night-lights in hallways and bathrooms. Install and use sturdy handrails on stairways. Wear nonskid footwear, even inside. Don't walk barefoot or in socks without shoes. Be safe outside. Use handrails, curb cuts, and ramps whenever possible. Keep your hands free by using a shoulder bag or backpack. Try to walk in well-lit areas. Watch out for uneven ground, changes in pavement, and debris. Be careful in the winter. Walk on the grass or gravel when sidewalks are slippery. Use de-icer on steps and walkways. Add non-slip devices to shoes. Put grab bars and nonskid mats in your shower or tub and near the toilet. Try to use a shower chair or bath bench when bathing. Get into a tub or shower by putting in your weaker leg first. Get out with your strong side first. Have a phone or medical alert device in the bathroom with you. Where can you learn more?   Go to http://www.cook.com/ and enter G117 to learn more about \"Preventing Falls: Care

## 2023-08-29 ENCOUNTER — CLINICAL DOCUMENTATION (OUTPATIENT)
Dept: SPIRITUAL SERVICES | Age: 73
End: 2023-08-29

## 2023-08-29 NOTE — ACP (ADVANCE CARE PLANNING)
Advance Care Planning     Advance Care Planning Clinical Specialist  Conversation Note      Date of ACP Conversation: 8/29/2023    [facility] Conducted with: Patient with Decision Making Capacity    ACP Clinical Specialist: FABIOLA Marie    Healthcare Decision Maker:     Current Designated Healthcare Decision Maker:     Secondary Decision Maker: Patrizia Josse - Brother/Sister - [phone]    Ventilation: \"If you were in your present state of health and suddenly became very ill and were unable to breathe on your own, what would your preference be about the use of a ventilator (breathing machine) if it were available to you? \"      If the patient would desire the use of ventilator (breathing machine), answer \"yes\". If not, \"no\": yes    \"If your health worsens and it becomes clear that your chance of recovery is unlikely, what would your preference be about the use of a ventilator (breathing machine) if it were available to you? \"     Would the patient desire the use of ventilator (breathing machine)?: No      Resuscitation  \"CPR works best to restart the heart when there is a sudden event, like a heart attack, in someone who is otherwise healthy. Unfortunately, CPR does not typically restart the heart for people who have serious health conditions or who are very sick. \"    \"In the event your heart stopped as a result of an underlying serious health condition, would you want attempts to be made to restart your heart (answer \"yes\" for attempt to resuscitate) or would you prefer a natural death (answer \"no\" for do not attempt to resuscitate)? \" no       [x] Yes   [] No   Educated Patient / Estela Laws regarding differences between Advance Directives and portable DNR orders.     Length of ACP Conversation in minutes:  35    Conversation Outcomes:  ACP discussion completed    Follow-up plan:    [] Schedule follow-up conversation to continue planning  [x] Referred individual to Provider for additional

## 2023-09-11 LAB — NONINV COLON CA DNA+OCC BLD SCRN STL QL: NEGATIVE

## 2023-09-15 ENCOUNTER — OFFICE VISIT (OUTPATIENT)
Dept: CARDIOLOGY CLINIC | Age: 73
End: 2023-09-15
Payer: MEDICARE

## 2023-09-15 VITALS
SYSTOLIC BLOOD PRESSURE: 108 MMHG | DIASTOLIC BLOOD PRESSURE: 74 MMHG | WEIGHT: 198 LBS | HEIGHT: 76 IN | BODY MASS INDEX: 24.11 KG/M2 | HEART RATE: 84 BPM

## 2023-09-15 DIAGNOSIS — I10 ESSENTIAL HYPERTENSION: ICD-10-CM

## 2023-09-15 DIAGNOSIS — I48.19 PERSISTENT ATRIAL FIBRILLATION (HCC): Primary | ICD-10-CM

## 2023-09-15 DIAGNOSIS — I48.19 HYPERCOAGULABLE STATE DUE TO PERSISTENT ATRIAL FIBRILLATION (HCC): ICD-10-CM

## 2023-09-15 DIAGNOSIS — Z79.899 ON AMIODARONE THERAPY: ICD-10-CM

## 2023-09-15 DIAGNOSIS — D68.69 HYPERCOAGULABLE STATE DUE TO PERSISTENT ATRIAL FIBRILLATION (HCC): ICD-10-CM

## 2023-09-15 PROCEDURE — 1123F ACP DISCUSS/DSCN MKR DOCD: CPT | Performed by: NURSE PRACTITIONER

## 2023-09-15 PROCEDURE — 3078F DIAST BP <80 MM HG: CPT | Performed by: NURSE PRACTITIONER

## 2023-09-15 PROCEDURE — G8427 DOCREV CUR MEDS BY ELIG CLIN: HCPCS | Performed by: NURSE PRACTITIONER

## 2023-09-15 PROCEDURE — 3017F COLORECTAL CA SCREEN DOC REV: CPT | Performed by: NURSE PRACTITIONER

## 2023-09-15 PROCEDURE — 93000 ELECTROCARDIOGRAM COMPLETE: CPT | Performed by: NURSE PRACTITIONER

## 2023-09-15 PROCEDURE — 1036F TOBACCO NON-USER: CPT | Performed by: NURSE PRACTITIONER

## 2023-09-15 PROCEDURE — 3074F SYST BP LT 130 MM HG: CPT | Performed by: NURSE PRACTITIONER

## 2023-09-15 PROCEDURE — 99214 OFFICE O/P EST MOD 30 MIN: CPT | Performed by: NURSE PRACTITIONER

## 2023-09-15 PROCEDURE — G8420 CALC BMI NORM PARAMETERS: HCPCS | Performed by: NURSE PRACTITIONER

## 2023-09-15 NOTE — PROGRESS NOTES
Well Visit, Ages 25 to 48: Care Instructions  Overview     Well visits can help you stay healthy. Your doctor has checked your overall health and may have suggested ways to take good care of yourself. Your doctor also may have recommended tests. At home, you can help prevent illness with healthy eating, regular exercise, and other steps. Follow-up care is a key part of your treatment and safety. Be sure to make and go to all appointments, and call your doctor if you are having problems. It's also a good idea to know your test results and keep a list of the medicines you take. How can you care for yourself at home? · Get screening tests that you and your doctor decide on. Screening helps find diseases before any symptoms appear. · Eat healthy foods. Choose fruits, vegetables, whole grains, protein, and low-fat dairy foods. Limit fat, especially saturated fat. Reduce salt in your diet. · Limit alcohol. If you are a man, have no more than 2 drinks a day or 14 drinks a week. If you are a woman, have no more than 1 drink a day or 7 drinks a week. · Get at least 30 minutes of physical activity on most days of the week. Walking is a good choice. You also may want to do other activities, such as running, swimming, cycling, or playing tennis or team sports. Discuss any changes in your exercise program with your doctor. · Reach and stay at a healthy weight. This will lower your risk for many problems, such as obesity, diabetes, heart disease, and high blood pressure. · Do not smoke or allow others to smoke around you. If you need help quitting, talk to your doctor about stop-smoking programs and medicines. These can increase your chances of quitting for good. · Care for your mental health. It is easy to get weighed down by worry and stress. Learn strategies to manage stress, like deep breathing and mindfulness, and stay connected with your family and community.  If you find you often feel sad or hopeless, talk with your doctor. Treatment can help. · Talk to your doctor about whether you have any risk factors for sexually transmitted infections (STIs). You can help prevent STIs if you wait to have sex with a new partner (or partners) until you've each been tested for STIs. It also helps if you use condoms (male or female condoms) and if you limit your sex partners to one person who only has sex with you. Vaccines are available for some STIs, such as HPV. · Use birth control if it's important to you to prevent pregnancy. Talk with your doctor about the choices available and what might be best for you. · If you think you may have a problem with alcohol or drug use, talk to your doctor. This includes prescription medicines (such as amphetamines and opioids) and illegal drugs (such as cocaine and methamphetamine). Your doctor can help you figure out what type of treatment is best for you. · Protect your skin from too much sun. When you're outdoors from 10 a.m. to 4 p.m., stay in the shade or cover up with clothing and a hat with a wide brim. Wear sunglasses that block UV rays. Even when it's cloudy, put broad-spectrum sunscreen (SPF 30 or higher) on any exposed skin. · See a dentist one or two times a year for checkups and to have your teeth cleaned. · Wear a seat belt in the car. When should you call for help? Watch closely for changes in your health, and be sure to contact your doctor if you have any problems or symptoms that concern you. Where can you learn more? Go to http://www.[employer].com/  Enter P072 in the search box to learn more about \"Well Visit, Ages 25 to 48: Care Instructions. \"  Current as of: February 11, 2021               Content Version: 13.0  © 0577-8653 Healthwise, Incorporated. Care instructions adapted under license by [employer] (which disclaims liability or warranty for this information).  If you have questions about a medical condition or this instruction, always ask your healthcare professional. [place] [address] any warranty or liability for your use of this information. General: Skin is warm and dry. Neurological:      General: No focal deficit present. Mental Status: He is alert and oriented to person, place, and time. Psychiatric:         Mood and Affect: Mood normal.         Thought Content: Thought content normal.          DATA:  Lab Results   Component Value Date    TROPONINT 0.010 (H) 03/07/2020     BNP:    Lab Results   Component Value Date    PROBNP 10,332 (H) 03/06/2020     PT/INR:  No results found for: \"PTINR\"  No results found for: \"LABA1C\"  Lab Results   Component Value Date    CHOL 150 03/10/2020    TRIG 83 03/10/2020    HDL 44 03/10/2020    LDLDIRECT 99 03/10/2020     Lab Results   Component Value Date    ALT 16 07/21/2023    AST 22 07/21/2023     TSH:   Lab Results   Component Value Date    TSH 6.46 (H) 11/28/2022       Echo    12/2020     Summary   This is a limited study s/p ablation to r/o pericardial effusion. Left ventricular systolic function is normal LVEF 50%   Mild to moderate mitral regurgitation. There is a trivial pericardial effusion. EKG -atrial fibrillation    Vitals:    09/15/23 1113   BP: 108/74   Site: Left Upper Arm   Position: Sitting   Cuff Size: Medium Adult   Pulse: 84   Weight: 198 lb (89.8 kg)   Height: 6' 4\" (1.93 m)        Impression and recommendations:     Persistent Atrial fibrillation  Hypercoagulable due to atrial fibrillation  Atrial tachycardia paroxysmal on metoprolol   Status post ablation of Persistent atrial fibrillation - PV isolation, CTI ablation, posterior wall isolation - on eliquis. HTN    EKG obtained reviewed- patient noted to be atrial fibrillation Heart rate appears to be controlled at rest  continue Toprol-XL to 25 mg twice daily for rate control  Patient denies issues with bleeding.   I will continue Eliquis 5 mg twice daily    He is taking amiodarone  He remains in atrial fibrillation  We will schedule for SEEMA/DCCV    Patient has previously been on Tikosyn before and after his PVI

## 2023-09-15 NOTE — PATIENT INSTRUCTIONS
Please be informed that if you contact our office outside of normal business hours the physician on call cannot help with any scheduling or rescheduling issues, procedure instruction questions or any type of medication issue. We advise you for any urgent/emergency that you go to the nearest emergency room! PLEASE CALL OUR OFFICE DURING NORMAL BUSINESS HOURS    Monday - Friday   8 am to 5 pm    [place]: [address]: [phone]    [place]:  [phone]  **It is YOUR responsibilty to bring medication bottles and/or updated medication list to [address] [place] [address]. This will allow us to better serve you and all your healthcare needs**  Thank you for allowing us to care for you today! We want to ensure we can follow your treatment plan and we strive to give you the best outcomes and experience possible. If you ever have a life threatening emergency and call 911 - for an ambulance (EMS)   Our providers can only care for you at:   [facility] or [facility]. Even if you have someone take you or you drive yourself we can only care for you in a [facility] facility. Our providers are not setup at the other healthcare locations!

## 2023-09-18 ENCOUNTER — HOSPITAL ENCOUNTER (OUTPATIENT)
Age: 73
Discharge: HOME | End: 2023-09-18
Payer: MEDICARE

## 2023-09-18 DIAGNOSIS — E78.2 MIXED HYPERLIPIDEMIA: Primary | ICD-10-CM

## 2023-09-18 DIAGNOSIS — E78.2 MIXED HYPERLIPIDEMIA: ICD-10-CM

## 2023-09-18 DIAGNOSIS — E03.8 SUBCLINICAL HYPOTHYROIDISM: ICD-10-CM

## 2023-09-18 DIAGNOSIS — N18.31 STAGE 3A CHRONIC KIDNEY DISEASE (CKD) (HCC): ICD-10-CM

## 2023-09-18 LAB
ALBUMIN SERPL-MCNC: 4.4 GM/DL (ref 3.4–5)
ALP BLD-CCNC: 60 IU/L (ref 40–128)
ALT SERPL-CCNC: 17 U/L (ref 10–40)
ANION GAP SERPL CALCULATED.3IONS-SCNC: 13 MMOL/L (ref 4–16)
AST SERPL-CCNC: 23 IU/L (ref 15–37)
BASOPHILS ABSOLUTE: 0 K/CU MM
BASOPHILS RELATIVE PERCENT: 0.3 % (ref 0–1)
BILIRUB SERPL-MCNC: 0.4 MG/DL (ref 0–1)
BUN SERPL-MCNC: 20 MG/DL (ref 6–23)
CALCIUM SERPL-MCNC: 9 MG/DL (ref 8.3–10.6)
CHLORIDE BLD-SCNC: 102 MMOL/L (ref 99–110)
CHOLEST SERPL-MCNC: 235 MG/DL
CO2: 20 MMOL/L (ref 21–32)
CREAT SERPL-MCNC: 1.4 MG/DL (ref 0.9–1.3)
DIFFERENTIAL TYPE: ABNORMAL
EOSINOPHILS ABSOLUTE: 0.1 K/CU MM
EOSINOPHILS RELATIVE PERCENT: 0.8 % (ref 0–3)
ESTIMATED AVERAGE GLUCOSE: 114 MG/DL
GFR SERPL CREATININE-BSD FRML MDRD: 53 ML/MIN/1.73M2
GLUCOSE SERPL-MCNC: 106 MG/DL (ref 70–99)
HBA1C MFR BLD: 5.6 % (ref 4.2–6.3)
HCT VFR BLD CALC: 41.9 % (ref 42–52)
HCV AB SERPL QL IA: NON REACTIVE
HDLC SERPL-MCNC: 56 MG/DL
HEMOGLOBIN: 14 GM/DL (ref 13.5–18)
IMMATURE NEUTROPHIL %: 0.2 % (ref 0–0.43)
LDLC SERPL CALC-MCNC: 145 MG/DL
LYMPHOCYTES ABSOLUTE: 2.1 K/CU MM
LYMPHOCYTES RELATIVE PERCENT: 32 % (ref 24–44)
MCH RBC QN AUTO: 30.4 PG (ref 27–31)
MCHC RBC AUTO-ENTMCNC: 33.4 % (ref 32–36)
MCV RBC AUTO: 90.9 FL (ref 78–100)
MONOCYTES ABSOLUTE: 0.4 K/CU MM
MONOCYTES RELATIVE PERCENT: 6.4 % (ref 0–4)
NUCLEATED RBC %: 0 %
PDW BLD-RTO: 13.3 % (ref 11.7–14.9)
PLATELET # BLD: 207 K/CU MM (ref 140–440)
PMV BLD AUTO: 11.2 FL (ref 7.5–11.1)
POTASSIUM SERPL-SCNC: 4.6 MMOL/L (ref 3.5–5.1)
RBC # BLD: 4.61 M/CU MM (ref 4.6–6.2)
SEGMENTED NEUTROPHILS ABSOLUTE COUNT: 3.9 K/CU MM
SEGMENTED NEUTROPHILS RELATIVE PERCENT: 60.3 % (ref 36–66)
SODIUM BLD-SCNC: 135 MMOL/L (ref 135–145)
T4 FREE SERPL-MCNC: 1.43 NG/DL (ref 0.9–1.8)
TOTAL IMMATURE NEUTOROPHIL: 0.01 K/CU MM
TOTAL NUCLEATED RBC: 0 K/CU MM
TOTAL PROTEIN: 6.9 GM/DL (ref 6.4–8.2)
TRIGL SERPL-MCNC: 170 MG/DL
TSH SERPL DL<=0.005 MIU/L-ACNC: 7.69 UIU/ML (ref 0.27–4.2)
WBC # BLD: 6.4 K/CU MM (ref 4–10.5)

## 2023-09-18 PROCEDURE — 85025 COMPLETE CBC W/AUTO DIFF WBC: CPT

## 2023-09-18 PROCEDURE — 83036 HEMOGLOBIN GLYCOSYLATED A1C: CPT

## 2023-09-18 PROCEDURE — 84443 ASSAY THYROID STIM HORMONE: CPT

## 2023-09-18 PROCEDURE — 84439 ASSAY OF FREE THYROXINE: CPT

## 2023-09-18 PROCEDURE — 36415 COLL VENOUS BLD VENIPUNCTURE: CPT

## 2023-09-18 PROCEDURE — 80061 LIPID PANEL: CPT

## 2023-09-18 PROCEDURE — 86803 HEPATITIS C AB TEST: CPT

## 2023-09-18 PROCEDURE — 80053 COMPREHEN METABOLIC PANEL: CPT

## 2023-09-18 RX ORDER — ROSUVASTATIN CALCIUM 10 MG/1
10 TABLET, COATED ORAL DAILY
Qty: 90 TABLET | Refills: 1 | Status: SHIPPED | OUTPATIENT
Start: 2023-09-18

## 2023-09-20 RX ORDER — AMIODARONE HYDROCHLORIDE 200 MG/1
TABLET ORAL
Qty: 28 TABLET | Refills: 0 | OUTPATIENT
Start: 2023-09-20

## 2023-09-25 ENCOUNTER — NURSE ONLY (OUTPATIENT)
Dept: CARDIOLOGY CLINIC | Age: 73
End: 2023-09-25

## 2023-09-25 DIAGNOSIS — I48.19 PERSISTENT ATRIAL FIBRILLATION (HCC): Primary | ICD-10-CM

## 2023-09-25 NOTE — PROGRESS NOTES
Patient here in office and educated on 9/25/2023, scheduled for SEEMA with Cardioversion on 9/27/2023 @ 1030, with arrival @ 0830, @ [facility]; risk explained; and consents signed. Instruction given to patient to; NPO after midnight the night before procedure; call hospital at [phone] to pre-register. May take rest of morning meds of procedure except Metoprolol. Hold metoprolol the morning before procedure. Patient voiced understanding. Copies of consent & info scanned in chart.

## 2023-09-27 ENCOUNTER — TELEPHONE (OUTPATIENT)
Dept: CARDIOLOGY CLINIC | Age: 73
End: 2023-09-27

## 2023-09-27 ENCOUNTER — ANESTHESIA EVENT (OUTPATIENT)
Dept: NON INVASIVE DIAGNOSTICS | Age: 73
End: 2023-09-27
Payer: MEDICARE

## 2023-09-27 ENCOUNTER — HOSPITAL ENCOUNTER (OUTPATIENT)
Dept: NON INVASIVE DIAGNOSTICS | Age: 73
Discharge: HOME | End: 2023-09-27
Payer: MEDICARE

## 2023-09-27 ENCOUNTER — ANESTHESIA (OUTPATIENT)
Dept: NON INVASIVE DIAGNOSTICS | Age: 73
End: 2023-09-27
Payer: MEDICARE

## 2023-09-27 VITALS
HEART RATE: 56 BPM | OXYGEN SATURATION: 99 % | SYSTOLIC BLOOD PRESSURE: 134 MMHG | RESPIRATION RATE: 20 BRPM | DIASTOLIC BLOOD PRESSURE: 88 MMHG

## 2023-09-27 LAB
EKG ATRIAL RATE: 258 BPM
EKG ATRIAL RATE: 50 BPM
EKG DIAGNOSIS: NORMAL
EKG DIAGNOSIS: NORMAL
EKG P AXIS: 84 DEGREES
EKG P-R INTERVAL: 268 MS
EKG Q-T INTERVAL: 422 MS
EKG Q-T INTERVAL: 498 MS
EKG QRS DURATION: 102 MS
EKG QRS DURATION: 98 MS
EKG QTC CALCULATION (BAZETT): 454 MS
EKG QTC CALCULATION (BAZETT): 474 MS
EKG R AXIS: -14 DEGREES
EKG R AXIS: -15 DEGREES
EKG T AXIS: 39 DEGREES
EKG T AXIS: 62 DEGREES
EKG VENTRICULAR RATE: 50 BPM
EKG VENTRICULAR RATE: 76 BPM

## 2023-09-27 PROCEDURE — 7100000001 HC PACU RECOVERY - ADDTL 15 MIN

## 2023-09-27 PROCEDURE — 2580000003 HC RX 258

## 2023-09-27 PROCEDURE — 3700000000 HC ANESTHESIA ATTENDED CARE: Performed by: ANESTHESIOLOGY

## 2023-09-27 PROCEDURE — 7100000000 HC PACU RECOVERY - FIRST 15 MIN

## 2023-09-27 PROCEDURE — 93005 ELECTROCARDIOGRAM TRACING: CPT | Performed by: INTERNAL MEDICINE

## 2023-09-27 PROCEDURE — 6360000002 HC RX W HCPCS

## 2023-09-27 PROCEDURE — 93312 ECHO TRANSESOPHAGEAL: CPT

## 2023-09-27 PROCEDURE — 92960 CARDIOVERSION ELECTRIC EXT: CPT

## 2023-09-27 PROCEDURE — 2500000003 HC RX 250 WO HCPCS

## 2023-09-27 PROCEDURE — 3700000001 HC ADD 15 MINUTES (ANESTHESIA): Performed by: ANESTHESIOLOGY

## 2023-09-27 RX ORDER — PROPOFOL 10 MG/ML
INJECTION, EMULSION INTRAVENOUS PRN
Status: DISCONTINUED | OUTPATIENT
Start: 2023-09-27 | End: 2023-09-27 | Stop reason: SDUPTHER

## 2023-09-27 RX ORDER — SODIUM CHLORIDE 9 MG/ML
INJECTION, SOLUTION INTRAVENOUS CONTINUOUS PRN
Status: DISCONTINUED | OUTPATIENT
Start: 2023-09-27 | End: 2023-09-27 | Stop reason: SDUPTHER

## 2023-09-27 RX ORDER — LIDOCAINE HYDROCHLORIDE 20 MG/ML
INJECTION, SOLUTION EPIDURAL; INFILTRATION; INTRACAUDAL; PERINEURAL PRN
Status: DISCONTINUED | OUTPATIENT
Start: 2023-09-27 | End: 2023-09-27 | Stop reason: SDUPTHER

## 2023-09-27 RX ADMIN — PROPOFOL 120 MG: 10 INJECTION, EMULSION INTRAVENOUS at 10:16

## 2023-09-27 RX ADMIN — LIDOCAINE HYDROCHLORIDE 100 MG: 20 INJECTION, SOLUTION EPIDURAL; INFILTRATION; INTRACAUDAL; PERINEURAL at 10:16

## 2023-09-27 RX ADMIN — SODIUM CHLORIDE: 9 INJECTION, SOLUTION INTRAVENOUS at 10:16

## 2023-09-27 NOTE — DISCHARGE INSTR - COC
Continuity of Care Form    Patient Name: Harini Haskins   :  1950  MRN:  [id number]    Admit date:  2023  Discharge date:  ***    Code Status Order: Prior   Advance Directives:     Admitting Physician:  No admitting provider for patient encounter. PCP: Rao Delgadillo MD    Discharging Nurse: [facility] Unit/Room#: No information available for this encounter. Discharging Unit Phone Number: ***    Emergency Contact:   Extended Emergency Contact Information  Primary Emergency ContactAlSarah Garces[address]  Home Phone: [phone]  Relation: Brother/Sister  Secondary Emergency Contact: Ceasar Wiseman  Address: [address] [place] [address]  Mobile Phone: [phone]  Relation: Child    Past Surgical History:  Past Surgical History:   Procedure Laterality Date    ARM SURGERY Left     \"have plate and screws still in place\"    CARDIAC CATHETERIZATION      per old chart had cath 3/2020    CARDIOVERSION  2020    \"had cardioversion- worked for some time but the atrial fib is back\"    CARDIOVERSION N/A 07/15/2021    Successful SEEMA/CV performed by Dr. Nikki Grayson.     COLONOSCOPY      EYE SURGERY      left eye cataract ext     TURP N/A 2020    CYSTOSCOPY W/BIPOLAR TURP REMOVAL OF BLADDER STONES performed by Joanne Landin MD at [facility] OR       Immunization History:   Immunization History   Administered Date(s) Administered    COVID-19, PFIZER PURPLE top, DILUTE for use, (age 15 y+), 30mcg/0.3mL 2021, 2021    Influenza, FLUARIX, FLULAVAL, FLUZONE (age 10 mo+) AND AFLURIA, (age 1 y+), PF, 0.5mL 2020       Active Problems:  Patient Active Problem List   Diagnosis Code    Atrial fibrillation with RVR ([address]) I48.91    Chronic combined systolic and diastolic congestive heart failure ([address]) I50.42    Essential hypertension I10    Thrombus of left atrial appendage I51.3    Nonischemic cardiomyopathy ([address]) I42.8

## 2023-09-27 NOTE — ANESTHESIA POSTPROCEDURE EVALUATION
Department of Anesthesiology  Postprocedure Note    Patient: Fantasma Mendoza  MRN: [id number]  YOB: 1950  Date of evaluation: 9/27/2023      Procedure Summary     Date: 09/27/23 Room / Location: [address] [place] [address]; [facility]    Anesthesia Start: 3401 Anesthesia Stop: 3530    Procedure: SRMZ SEEMA W/CARDIOVERSION Diagnosis: AF (paroxysmal atrial fibrillation) ([address])    Scheduled Providers:  Responsible Provider: Cherelle Schulz MD    Anesthesia Type: MAC ASA Status: 3          Anesthesia Type: No value filed.     Jose Antonio Phase I: Jose Antonio Score: 10    Jose Antonio Phase II:        Anesthesia Post Evaluation    Patient location during evaluation: bedside  Patient participation: complete - patient participated  Level of consciousness: awake  Pain score: 0  Airway patency: patent  Nausea & Vomiting: no nausea and no vomiting  Complications: no  Cardiovascular status: blood pressure returned to baseline and hemodynamically stable  Respiratory status: acceptable and room air  Hydration status: euvolemic  Pain management: adequate

## 2023-09-27 NOTE — H&P
Previous visit 7/14/2021  Patient is here today for follow-up on tachycardia with associated fatigue. Patient was seen 2 weeks ago and was started on Cardizem. Patient reports that he did not start the Cardizem because he was feeling fatigued. He states that he continues to have fatigue and palpitations that are intermittent in nature. He states the dizziness has resolved. He reports he will have these symptoms both at rest and with ambulation. He reports that symptoms are unchanged from previous office visit. He reports that he has stopped drinking 1 beer a day. He is taking his medications as prescribed. Previous visit 6/30/2021  Patient is here today with complaints of dizziness, fatigue, palpitations. He reports the symptoms started about 2 weeks ago. He states they are intermittent in nature. He reports he will have these symptoms both at rest and with ambulation. He denies alleviating factors. He states that he will wake up in the middle of the night having palpitations. He states that it is a pounding sensation that lasts 5 to 15 minutes. He reports this is happening every night. He will have associated chest tightness shortness of breath and lightheadedness. Patient is taking his medications as prescribed. He states that he recently started drinking 1 beer a day. Previous visit 5/4/2021  Patient here for 4-month follow-up status post ablation of atrial fibrillation. He reports that he is feeling well. He denies episodes of palpitations or tachycardia. He additionally denies chest pain, lightheadedness, dizziness, shortness of breath edema or syncope. He continues to not drink alcohol, caffeine or smoke cigarettes. He is here today to go over his 30-day event monitor as he would like to come off of his Tikosyn. Previous visit 3/18/2021  Patient is here today for 3-month follow-up status post ablation of atrial fibrillation.   He reports that he continues to have episodes of

## 2023-09-28 NOTE — TELEPHONE ENCOUNTER
Attempted to call patient back to schedule follow up and unable to leave message as mailbox was full.

## 2023-10-06 ENCOUNTER — OFFICE VISIT (OUTPATIENT)
Dept: CARDIOLOGY CLINIC | Age: 73
End: 2023-10-06
Payer: MEDICARE

## 2023-10-06 VITALS
WEIGHT: 200 LBS | HEART RATE: 56 BPM | HEIGHT: 76 IN | SYSTOLIC BLOOD PRESSURE: 102 MMHG | BODY MASS INDEX: 24.36 KG/M2 | DIASTOLIC BLOOD PRESSURE: 60 MMHG

## 2023-10-06 DIAGNOSIS — I48.19 PERSISTENT ATRIAL FIBRILLATION (HCC): ICD-10-CM

## 2023-10-06 DIAGNOSIS — I48.0 PAF (PAROXYSMAL ATRIAL FIBRILLATION) (HCC): Primary | ICD-10-CM

## 2023-10-06 DIAGNOSIS — I42.8 NONISCHEMIC CARDIOMYOPATHY (HCC): ICD-10-CM

## 2023-10-06 PROCEDURE — 1123F ACP DISCUSS/DSCN MKR DOCD: CPT | Performed by: INTERNAL MEDICINE

## 2023-10-06 PROCEDURE — 1036F TOBACCO NON-USER: CPT | Performed by: INTERNAL MEDICINE

## 2023-10-06 PROCEDURE — G8427 DOCREV CUR MEDS BY ELIG CLIN: HCPCS | Performed by: INTERNAL MEDICINE

## 2023-10-06 PROCEDURE — 93000 ELECTROCARDIOGRAM COMPLETE: CPT | Performed by: INTERNAL MEDICINE

## 2023-10-06 PROCEDURE — 3074F SYST BP LT 130 MM HG: CPT | Performed by: INTERNAL MEDICINE

## 2023-10-06 PROCEDURE — G8420 CALC BMI NORM PARAMETERS: HCPCS | Performed by: INTERNAL MEDICINE

## 2023-10-06 PROCEDURE — G8484 FLU IMMUNIZE NO ADMIN: HCPCS | Performed by: INTERNAL MEDICINE

## 2023-10-06 PROCEDURE — 3078F DIAST BP <80 MM HG: CPT | Performed by: INTERNAL MEDICINE

## 2023-10-06 PROCEDURE — 99213 OFFICE O/P EST LOW 20 MIN: CPT | Performed by: INTERNAL MEDICINE

## 2023-10-06 PROCEDURE — 3017F COLORECTAL CA SCREEN DOC REV: CPT | Performed by: INTERNAL MEDICINE

## 2023-10-06 RX ORDER — LISINOPRIL 2.5 MG/1
TABLET ORAL
Qty: 90 TABLET | Refills: 0 | Status: SHIPPED | OUTPATIENT
Start: 2023-10-06

## 2023-10-06 RX ORDER — METOPROLOL SUCCINATE 25 MG/1
25 TABLET, EXTENDED RELEASE ORAL DAILY
Qty: 90 TABLET | Refills: 0 | Status: SHIPPED | OUTPATIENT
Start: 2023-10-06

## 2023-10-06 NOTE — PATIENT INSTRUCTIONS
Please be informed that if you contact our office outside of normal business hours the physician on call cannot help with any scheduling or rescheduling issues, procedure instruction questions or any type of medication issue. We advise you for any urgent/emergency that you go to the nearest emergency room! PLEASE CALL OUR OFFICE DURING NORMAL BUSINESS HOURS    Monday - Friday   8 am to 5 pm    [place]: [address]: [phone]    [place]:  [phone]    **It is YOUR responsibilty to bring medication bottles and/or updated medication list to [facility]. This will allow us to better serve you and all your healthcare needs**    Thank you for allowing us to care for you today! We want to ensure we can follow your treatment plan and we strive to give you the best outcomes and experience possible. If you ever have a life threatening emergency and call 911 - for an ambulance (EMS)   Our providers can only care for you at:   [facility] or [facility]. Even if you have someone take you or you drive yourself we can only care for you in a [facility] facility. Our providers are not setup at the other healthcare locations! [facility] Locations - No appointment necessary. Sites open Monday to Friday. Call your preferred location for test preparation, business   hours and other information you need. [employer]. [address] [place] [address]. [address] [place], [facility]  Phone: [phone]       We are committed to providing you the best care possible. If you receive a survey after visiting one of our offices, please take time to share your experience concerning your physician office visit. These surveys are confidential and no health information about you is shared. We are eager to improve for you and we are counting on your feedback to help make that happen.

## 2023-10-16 RX ORDER — METOPROLOL SUCCINATE 25 MG/1
25 TABLET, EXTENDED RELEASE ORAL DAILY
Qty: 90 TABLET | Refills: 0 | OUTPATIENT
Start: 2023-10-16

## 2024-01-04 RX ORDER — LISINOPRIL 2.5 MG/1
2.5 TABLET ORAL DAILY
Qty: 90 TABLET | Refills: 3 | Status: SHIPPED | OUTPATIENT
Start: 2024-01-04

## 2024-01-09 RX ORDER — METOPROLOL SUCCINATE 25 MG/1
25 TABLET, EXTENDED RELEASE ORAL DAILY
Qty: 90 TABLET | Refills: 3 | Status: SHIPPED | OUTPATIENT
Start: 2024-01-09

## 2024-01-17 RX ORDER — AMIODARONE HYDROCHLORIDE 200 MG/1
200 TABLET ORAL DAILY
Qty: 30 TABLET | Refills: 0 | Status: SHIPPED | OUTPATIENT
Start: 2024-01-17

## 2024-01-22 RX ORDER — AMIODARONE HYDROCHLORIDE 200 MG/1
200 TABLET ORAL DAILY
Qty: 30 TABLET | Refills: 0 | OUTPATIENT
Start: 2024-01-22

## 2024-01-23 RX ORDER — APIXABAN 5 MG/1
TABLET, FILM COATED ORAL
Qty: 60 TABLET | Refills: 0 | Status: SHIPPED | OUTPATIENT
Start: 2024-01-23

## 2024-02-20 RX ORDER — APIXABAN 5 MG/1
TABLET, FILM COATED ORAL
Qty: 60 TABLET | Refills: 0 | Status: SHIPPED | OUTPATIENT
Start: 2024-02-20

## 2024-02-21 RX ORDER — AMIODARONE HYDROCHLORIDE 200 MG/1
200 TABLET ORAL DAILY
Qty: 30 TABLET | Refills: 0 | Status: SHIPPED | OUTPATIENT
Start: 2024-02-21

## 2024-02-28 ENCOUNTER — NURSE ONLY (OUTPATIENT)
Dept: CARDIOLOGY CLINIC | Age: 74
End: 2024-02-28
Payer: MEDICARE

## 2024-02-28 ENCOUNTER — OFFICE VISIT (OUTPATIENT)
Dept: CARDIOLOGY CLINIC | Age: 74
End: 2024-02-28
Payer: MEDICARE

## 2024-02-28 VITALS
DIASTOLIC BLOOD PRESSURE: 84 MMHG | WEIGHT: 203.2 LBS | HEART RATE: 77 BPM | SYSTOLIC BLOOD PRESSURE: 138 MMHG | BODY MASS INDEX: 24.74 KG/M2 | OXYGEN SATURATION: 99 % | HEIGHT: 76 IN

## 2024-02-28 DIAGNOSIS — Z79.899 ON AMIODARONE THERAPY: ICD-10-CM

## 2024-02-28 DIAGNOSIS — D68.69 SECONDARY HYPERCOAGULABLE STATE (HCC): ICD-10-CM

## 2024-02-28 DIAGNOSIS — Z79.899 ON AMIODARONE THERAPY: Primary | ICD-10-CM

## 2024-02-28 DIAGNOSIS — I48.0 PAROXYSMAL ATRIAL FIBRILLATION (HCC): ICD-10-CM

## 2024-02-28 DIAGNOSIS — I47.19 ATRIAL TACHYCARDIA: Primary | ICD-10-CM

## 2024-02-28 LAB
DEPRECATED RDW RBC AUTO: 13.5 % (ref 12.4–15.4)
HCT VFR BLD AUTO: 40.3 % (ref 40.5–52.5)
HGB BLD-MCNC: 14.1 G/DL (ref 13.5–17.5)
MCH RBC QN AUTO: 32.3 PG (ref 26–34)
MCHC RBC AUTO-ENTMCNC: 34.9 G/DL (ref 31–36)
MCV RBC AUTO: 92.5 FL (ref 80–100)
PLATELET # BLD AUTO: 186 K/UL (ref 135–450)
PMV BLD AUTO: 10.6 FL (ref 5–10.5)
RBC # BLD AUTO: 4.36 M/UL (ref 4.2–5.9)
WBC # BLD AUTO: 7.6 K/UL (ref 4–11)

## 2024-02-28 PROCEDURE — 3017F COLORECTAL CA SCREEN DOC REV: CPT | Performed by: NURSE PRACTITIONER

## 2024-02-28 PROCEDURE — 1036F TOBACCO NON-USER: CPT | Performed by: NURSE PRACTITIONER

## 2024-02-28 PROCEDURE — 93000 ELECTROCARDIOGRAM COMPLETE: CPT | Performed by: NURSE PRACTITIONER

## 2024-02-28 PROCEDURE — 36415 COLL VENOUS BLD VENIPUNCTURE: CPT | Performed by: INTERNAL MEDICINE

## 2024-02-28 PROCEDURE — 3079F DIAST BP 80-89 MM HG: CPT | Performed by: NURSE PRACTITIONER

## 2024-02-28 PROCEDURE — 1123F ACP DISCUSS/DSCN MKR DOCD: CPT | Performed by: NURSE PRACTITIONER

## 2024-02-28 PROCEDURE — G8484 FLU IMMUNIZE NO ADMIN: HCPCS | Performed by: NURSE PRACTITIONER

## 2024-02-28 PROCEDURE — G8420 CALC BMI NORM PARAMETERS: HCPCS | Performed by: NURSE PRACTITIONER

## 2024-02-28 PROCEDURE — 99214 OFFICE O/P EST MOD 30 MIN: CPT | Performed by: NURSE PRACTITIONER

## 2024-02-28 PROCEDURE — 3075F SYST BP GE 130 - 139MM HG: CPT | Performed by: NURSE PRACTITIONER

## 2024-02-28 PROCEDURE — G8427 DOCREV CUR MEDS BY ELIG CLIN: HCPCS | Performed by: NURSE PRACTITIONER

## 2024-02-28 RX ORDER — AMIODARONE HYDROCHLORIDE 200 MG/1
200 TABLET ORAL DAILY
Qty: 90 TABLET | Refills: 1 | Status: SHIPPED | OUTPATIENT
Start: 2024-02-28

## 2024-02-28 NOTE — PROGRESS NOTES
09/18/2023    TRIG 170 (H) 09/18/2023    HDL 56 09/18/2023    LDLCALC 145 (H) 09/18/2023    LDLDIRECT 99 03/10/2020     Lab Results   Component Value Date    ALT 17 09/18/2023    AST 23 09/18/2023     TSH:   Lab Results   Component Value Date    TSH 6.46 (H) 11/28/2022       Echo    12/2020   Summary   This is a limited study s/p ablation to r/o pericardial effusion.   Left ventricular systolic function is normal LVEF 50%   Mild to moderate mitral regurgitation.   There is a trivial pericardial effusion.       EKG - SINUS bradycardia, first degree AV block    Vitals:    02/28/24 1401   BP: 138/84   Site: Left Upper Arm   Position: Sitting   Cuff Size: Medium Adult   Pulse: 77   SpO2: 99%   Weight: 92.2 kg (203 lb 3.2 oz)   Height: 1.93 m (6' 4\")        Impression and recommendations:     Atrial tachycardia paroxysmal  and PAF sp cardioversion  Status post ablation of Persistent atrial fibrillation - PV isolation, CTI ablation, posterior wall isolation  Hypercoagulable due to paroxysmal atrial fibrillation  HTN  History of ALEJANDRA thrombis    Patient is feeling well at this time  Will continue amiodarone 200 mg daily  Will get CMP, CBC, and LFT  Will get PFT's  Will continue toprol xl 25 mg daily    Patient denies issues bleeding  Will continue eliquis 5 mg BID     Vitals:    02/28/24 1401   BP: 138/84   Pulse: 77   SpO2: 99%     BP is stable. Continue lisinopril 2.5 mg daily                 ROSALINO Olson - CNP on 2/28/2024 at 2:20 PM

## 2024-02-29 LAB
ALBUMIN SERPL-MCNC: 4.7 G/DL (ref 3.4–5)
ALBUMIN/GLOB SERPL: 1.9 {RATIO} (ref 1.1–2.2)
ALP SERPL-CCNC: 70 U/L (ref 40–129)
ALT SERPL-CCNC: 25 U/L (ref 10–40)
ANION GAP SERPL CALCULATED.3IONS-SCNC: 13 MMOL/L (ref 3–16)
AST SERPL-CCNC: 27 U/L (ref 15–37)
BILIRUB SERPL-MCNC: 0.4 MG/DL (ref 0–1)
BUN SERPL-MCNC: 22 MG/DL (ref 7–20)
CALCIUM SERPL-MCNC: 9.6 MG/DL (ref 8.3–10.6)
CHLORIDE SERPL-SCNC: 99 MMOL/L (ref 99–110)
CO2 SERPL-SCNC: 27 MMOL/L (ref 21–32)
CREAT SERPL-MCNC: 1.5 MG/DL (ref 0.8–1.3)
GFR SERPLBLD CREATININE-BSD FMLA CKD-EPI: 48 ML/MIN/{1.73_M2}
GLUCOSE SERPL-MCNC: 144 MG/DL (ref 70–99)
POTASSIUM SERPL-SCNC: 4.4 MMOL/L (ref 3.5–5.1)
PROT SERPL-MCNC: 7.2 G/DL (ref 6.4–8.2)
SODIUM SERPL-SCNC: 139 MMOL/L (ref 136–145)
T4 FREE SERPL-MCNC: 1.4 NG/DL (ref 0.9–1.8)
TSH SERPL DL<=0.005 MIU/L-ACNC: 6.41 UIU/ML (ref 0.27–4.2)

## 2024-03-05 DIAGNOSIS — Z79.899 ON AMIODARONE THERAPY: Primary | ICD-10-CM

## 2024-03-11 ENCOUNTER — TELEPHONE (OUTPATIENT)
Dept: CARDIOLOGY CLINIC | Age: 74
End: 2024-03-11

## 2024-03-11 NOTE — TELEPHONE ENCOUNTER
Called and left message with time/day of PFT  Tuesday May 7th  Arriving at [facility] at 7:30am

## 2024-03-16 DIAGNOSIS — E78.2 MIXED HYPERLIPIDEMIA: ICD-10-CM

## 2024-03-18 RX ORDER — ROSUVASTATIN CALCIUM 10 MG/1
10 TABLET, COATED ORAL DAILY
Qty: 90 TABLET | Refills: 1 | Status: SHIPPED | OUTPATIENT
Start: 2024-03-18

## 2024-05-15 RX ORDER — PANTOPRAZOLE SODIUM 40 MG/1
40 TABLET, DELAYED RELEASE ORAL DAILY
Qty: 90 TABLET | Refills: 1 | OUTPATIENT
Start: 2024-05-15

## 2024-05-20 ENCOUNTER — TELEPHONE (OUTPATIENT)
Dept: CARDIOLOGY CLINIC | Age: 74
End: 2024-05-20

## 2024-05-21 ENCOUNTER — OFFICE VISIT (OUTPATIENT)
Dept: FAMILY MEDICINE CLINIC | Age: 74
End: 2024-05-21
Payer: MEDICARE

## 2024-05-21 VITALS
TEMPERATURE: 97.2 F | HEART RATE: 67 BPM | WEIGHT: 198 LBS | SYSTOLIC BLOOD PRESSURE: 128 MMHG | BODY MASS INDEX: 24.1 KG/M2 | OXYGEN SATURATION: 98 % | DIASTOLIC BLOOD PRESSURE: 68 MMHG

## 2024-05-21 DIAGNOSIS — L02.91 ABSCESS: Primary | ICD-10-CM

## 2024-05-21 DIAGNOSIS — I50.42 CHRONIC COMBINED SYSTOLIC AND DIASTOLIC CONGESTIVE HEART FAILURE (HCC): ICD-10-CM

## 2024-05-21 DIAGNOSIS — E78.2 MIXED HYPERLIPIDEMIA: ICD-10-CM

## 2024-05-21 DIAGNOSIS — N18.31 STAGE 3A CHRONIC KIDNEY DISEASE (CKD) (HCC): ICD-10-CM

## 2024-05-21 DIAGNOSIS — I10 ESSENTIAL HYPERTENSION: ICD-10-CM

## 2024-05-21 PROCEDURE — 1123F ACP DISCUSS/DSCN MKR DOCD: CPT | Performed by: FAMILY MEDICINE

## 2024-05-21 PROCEDURE — G8427 DOCREV CUR MEDS BY ELIG CLIN: HCPCS | Performed by: FAMILY MEDICINE

## 2024-05-21 PROCEDURE — 1036F TOBACCO NON-USER: CPT | Performed by: FAMILY MEDICINE

## 2024-05-21 PROCEDURE — 3017F COLORECTAL CA SCREEN DOC REV: CPT | Performed by: FAMILY MEDICINE

## 2024-05-21 PROCEDURE — 99213 OFFICE O/P EST LOW 20 MIN: CPT | Performed by: FAMILY MEDICINE

## 2024-05-21 PROCEDURE — G8420 CALC BMI NORM PARAMETERS: HCPCS | Performed by: FAMILY MEDICINE

## 2024-05-21 PROCEDURE — 3074F SYST BP LT 130 MM HG: CPT | Performed by: FAMILY MEDICINE

## 2024-05-21 PROCEDURE — 3078F DIAST BP <80 MM HG: CPT | Performed by: FAMILY MEDICINE

## 2024-05-21 RX ORDER — SULFAMETHOXAZOLE AND TRIMETHOPRIM 800; 160 MG/1; MG/1
1 TABLET ORAL 2 TIMES DAILY
Qty: 14 TABLET | Refills: 0 | Status: SHIPPED | OUTPATIENT
Start: 2024-05-21 | End: 2024-05-28

## 2024-05-21 SDOH — ECONOMIC STABILITY: HOUSING INSECURITY
IN THE LAST 12 MONTHS, WAS THERE A TIME WHEN YOU DID NOT HAVE A STEADY PLACE TO SLEEP OR SLEPT IN A SHELTER (INCLUDING NOW)?: NO

## 2024-05-21 SDOH — ECONOMIC STABILITY: FOOD INSECURITY: WITHIN THE PAST 12 MONTHS, YOU WORRIED THAT YOUR FOOD WOULD RUN OUT BEFORE YOU GOT MONEY TO BUY MORE.: NEVER TRUE

## 2024-05-21 SDOH — ECONOMIC STABILITY: FOOD INSECURITY: WITHIN THE PAST 12 MONTHS, THE FOOD YOU BOUGHT JUST DIDN'T LAST AND YOU DIDN'T HAVE MONEY TO GET MORE.: NEVER TRUE

## 2024-05-21 SDOH — ECONOMIC STABILITY: INCOME INSECURITY: HOW HARD IS IT FOR YOU TO PAY FOR THE VERY BASICS LIKE FOOD, HOUSING, MEDICAL CARE, AND HEATING?: NOT HARD AT ALL

## 2024-05-21 ASSESSMENT — PATIENT HEALTH QUESTIONNAIRE - PHQ9
SUM OF ALL RESPONSES TO PHQ9 QUESTIONS 1 & 2: 0
1. LITTLE INTEREST OR PLEASURE IN DOING THINGS: NOT AT ALL
SUM OF ALL RESPONSES TO PHQ QUESTIONS 1-9: 0
2. FEELING DOWN, DEPRESSED OR HOPELESS: NOT AT ALL

## 2024-05-21 NOTE — ASSESSMENT & PLAN NOTE
Plan to continue rosuvastatin to reduce heart attack risk.  He did not want to check a lipid panel today.

## 2024-05-21 NOTE — PROGRESS NOTES
5/21/24    Dave Wiseman  1950    SUBJECTIVE    HPI - Dave is a 73 y.o. male who presents today for evaluation of:  Chief Complaint   Patient presents with    Annual Exam    Other     Boil or cyst on pack getting bigger for many many years       Big bump : large bump on back. In the poast he would squeeze out the puss every 6 months or so. Not painful until yesterday when he used a back scratcher.  In the past he has always squeezed the lump and expressed pus on his own and that has solve the problem for about 6 months.  The current one feels to be a little more infected than usual.    HF : +short of breath with exercise. No significant decrease in tolerance. Walks 1-2 miles in the morning. Also plays golf riding.       Review of Systems   Constitutional:  Negative for fatigue and fever.   Cardiovascular:  Positive for palpitations. Negative for chest pain and leg swelling.         No Known Allergies     OBJECTIVE    /68   Pulse 67   Temp 97.2 °F (36.2 °C) (Infrared)   Wt 89.8 kg (198 lb)   SpO2 98%   BMI 24.10 kg/m²     Physical Exam   Constitutional:       General: Not in acute distress.     Appearance: Normal appearance. Not ill-appearing.   Eyes:      General: No scleral icterus.  Cardiovascular:      Rate and Rhythm: Normal rate and regular rhythm.      Heart sounds: No murmur heard.   No friction rub. No gallop.    Pulmonary:      Effort: Pulmonary effort is normal. No respiratory distress.      Breath sounds: No wheezing, rhonchi or rales.   Abdominal:      Palpations: Abdomen is soft. There is no mass.      Tenderness: There is no abdominal tenderness.   Musculoskeletal:     Moves all extremities normally.  No pretib edema.   Skin:     General: Skin is warm.      Coloration: Skin is not jaundiced.  He has a skin lesion on the back that appears to have 2 centers.  The lower one has some surrounding redness.  Neurological:      Mental Status: Patient is alert.   Psychiatric:

## 2024-05-30 ENCOUNTER — TELEPHONE (OUTPATIENT)
Dept: FAMILY MEDICINE CLINIC | Age: 74
End: 2024-05-30

## 2024-05-30 DIAGNOSIS — K21.9 GASTROESOPHAGEAL REFLUX DISEASE WITHOUT ESOPHAGITIS: Primary | ICD-10-CM

## 2024-05-31 RX ORDER — PANTOPRAZOLE SODIUM 40 MG/1
40 TABLET, DELAYED RELEASE ORAL DAILY
Qty: 90 TABLET | Refills: 2 | Status: SHIPPED | OUTPATIENT
Start: 2024-05-31

## 2024-08-12 ENCOUNTER — OFFICE VISIT (OUTPATIENT)
Dept: FAMILY MEDICINE CLINIC | Age: 74
End: 2024-08-12
Payer: MEDICARE

## 2024-08-12 VITALS
DIASTOLIC BLOOD PRESSURE: 60 MMHG | HEART RATE: 59 BPM | BODY MASS INDEX: 24.49 KG/M2 | SYSTOLIC BLOOD PRESSURE: 112 MMHG | OXYGEN SATURATION: 99 % | WEIGHT: 201.2 LBS

## 2024-08-12 DIAGNOSIS — N18.31 STAGE 3A CHRONIC KIDNEY DISEASE (CKD) (HCC): ICD-10-CM

## 2024-08-12 DIAGNOSIS — R10.84 GENERALIZED ABDOMINAL PAIN: Primary | ICD-10-CM

## 2024-08-12 DIAGNOSIS — E03.8 SUBCLINICAL HYPOTHYROIDISM: ICD-10-CM

## 2024-08-12 DIAGNOSIS — E78.2 MIXED HYPERLIPIDEMIA: ICD-10-CM

## 2024-08-12 PROCEDURE — 1123F ACP DISCUSS/DSCN MKR DOCD: CPT | Performed by: FAMILY MEDICINE

## 2024-08-12 PROCEDURE — G8420 CALC BMI NORM PARAMETERS: HCPCS | Performed by: FAMILY MEDICINE

## 2024-08-12 PROCEDURE — 3017F COLORECTAL CA SCREEN DOC REV: CPT | Performed by: FAMILY MEDICINE

## 2024-08-12 PROCEDURE — 3078F DIAST BP <80 MM HG: CPT | Performed by: FAMILY MEDICINE

## 2024-08-12 PROCEDURE — G8427 DOCREV CUR MEDS BY ELIG CLIN: HCPCS | Performed by: FAMILY MEDICINE

## 2024-08-12 PROCEDURE — 3074F SYST BP LT 130 MM HG: CPT | Performed by: FAMILY MEDICINE

## 2024-08-12 PROCEDURE — 99214 OFFICE O/P EST MOD 30 MIN: CPT | Performed by: FAMILY MEDICINE

## 2024-08-12 PROCEDURE — 36415 COLL VENOUS BLD VENIPUNCTURE: CPT | Performed by: FAMILY MEDICINE

## 2024-08-12 PROCEDURE — 1036F TOBACCO NON-USER: CPT | Performed by: FAMILY MEDICINE

## 2024-08-12 RX ORDER — ROSUVASTATIN CALCIUM 10 MG/1
10 TABLET, COATED ORAL DAILY
Qty: 90 TABLET | Refills: 3 | Status: SHIPPED | OUTPATIENT
Start: 2024-08-12

## 2024-08-12 ASSESSMENT — ENCOUNTER SYMPTOMS
RECTAL PAIN: 0
ABDOMINAL PAIN: 1
ABDOMINAL DISTENTION: 1
COUGH: 0
ANAL BLEEDING: 0
WHEEZING: 0
BLOOD IN STOOL: 0
DIARRHEA: 0
CONSTIPATION: 1
NAUSEA: 0
SHORTNESS OF BREATH: 1
VOMITING: 0

## 2024-08-12 NOTE — ASSESSMENT & PLAN NOTE
He has subclinical hypothyroidism for the past 30 years.  Currently not taking any medication.  Will check a TSH and T4 and I suggested consideration of a very low-dose of levothyroxine if today's tests also show subclinical hypothyroidism.  Discussed risks of osteoporosis and arrhythmia though these would be unlikely at a very low dose

## 2024-08-12 NOTE — ASSESSMENT & PLAN NOTE
He has new abdominal pain as well as coughing up mucus.  I will check a CT of the abdomen and pelvis.  He does have kidney insufficiency so we may have to do it without contrast.  I told him that he should drink a lot of water before the test.  I will also check a CBC and C MP.

## 2024-08-12 NOTE — PROGRESS NOTES
8/12/24    Dave Wiseman  1950    SUBJECTIVE    HPI - Dave is a 74 y.o. male who presents today for evaluation of:  Chief Complaint   Patient presents with    OTHER     Patient states for 15-30 minutes every morning when waking up he is gaging up mucus      Gagging : wakes up and gags and brings upo mucus from gut that feels like it comes from further down the gut than the stomach. Also having abdominal discomfort in the morning. Often feels constipated. Did a mail in [employer]  test. Poor appetite in morning. Definitely hungry at lunch. No weight change. Notes more of a bunge in abdomen recently. Pain in abd is mostly discomfort but gets a little sharp at times. Not painful after eating. No hx abd surgery.  He is wondering if some imaging of the abdomen would be a good idea.    Thyroid: He states he was told he had hyperthyroidism when he was young.  He reports some chronic fatigue that is mild    Review of Systems   Constitutional:  Positive for appetite change. Negative for unexpected weight change.   Respiratory:  Positive for shortness of breath. Negative for cough and wheezing.    Cardiovascular:  Negative for chest pain.   Gastrointestinal:  Positive for abdominal distention, abdominal pain and constipation. Negative for anal bleeding, blood in stool, diarrhea, nausea, rectal pain and vomiting.   Genitourinary:  Negative for difficulty urinating, dysuria and hematuria.        Nocturia x 1 up to 3 times         No Known Allergies     OBJECTIVE    /60 (Site: Left Upper Arm, Position: Sitting, Cuff Size: Medium Adult)   Pulse 59   Wt 91.3 kg (201 lb 3.2 oz)   SpO2 99%   BMI 24.49 kg/m²     Physical Exam   Constitutional:       General: Not in acute distress.     Appearance: Normal appearance. Not ill-appearing.   Eyes:      General: No scleral icterus.  Cardiovascular:      Rate and Rhythm: Normal rate and regular rhythm.      Heart sounds: No murmur heard.   No friction rub. No gallop.

## 2024-08-13 DIAGNOSIS — E03.8 SUBCLINICAL HYPOTHYROIDISM: Primary | ICD-10-CM

## 2024-08-13 LAB
ALBUMIN SERPL-MCNC: 4.3 G/DL (ref 3.4–5)
ALBUMIN/GLOB SERPL: 1.8 {RATIO} (ref 1.1–2.2)
ALP SERPL-CCNC: 60 U/L (ref 40–129)
ALT SERPL-CCNC: 21 U/L (ref 10–40)
ANION GAP SERPL CALCULATED.3IONS-SCNC: 15 MMOL/L (ref 3–16)
AST SERPL-CCNC: 26 U/L (ref 15–37)
BASOPHILS # BLD: 0 K/UL (ref 0–0.2)
BASOPHILS NFR BLD: 0.2 %
BILIRUB SERPL-MCNC: 0.5 MG/DL (ref 0–1)
BUN SERPL-MCNC: 16 MG/DL (ref 7–20)
CALCIUM SERPL-MCNC: 9 MG/DL (ref 8.3–10.6)
CHLORIDE SERPL-SCNC: 103 MMOL/L (ref 99–110)
CO2 SERPL-SCNC: 23 MMOL/L (ref 21–32)
CREAT SERPL-MCNC: 1.2 MG/DL (ref 0.8–1.3)
DEPRECATED RDW RBC AUTO: 15 % (ref 12.4–15.4)
EOSINOPHIL # BLD: 0 K/UL (ref 0–0.6)
EOSINOPHIL NFR BLD: 0.6 %
GFR SERPLBLD CREATININE-BSD FMLA CKD-EPI: 63 ML/MIN/{1.73_M2}
GLUCOSE SERPL-MCNC: 129 MG/DL (ref 70–99)
HCT VFR BLD AUTO: 41.6 % (ref 40.5–52.5)
HGB BLD-MCNC: 13.9 G/DL (ref 13.5–17.5)
LYMPHOCYTES # BLD: 1.7 K/UL (ref 1–5.1)
LYMPHOCYTES NFR BLD: 26.5 %
MCH RBC QN AUTO: 31.4 PG (ref 26–34)
MCHC RBC AUTO-ENTMCNC: 33.3 G/DL (ref 31–36)
MCV RBC AUTO: 94.2 FL (ref 80–100)
MONOCYTES # BLD: 0.4 K/UL (ref 0–1.3)
MONOCYTES NFR BLD: 5.7 %
NEUTROPHILS # BLD: 4.4 K/UL (ref 1.7–7.7)
NEUTROPHILS NFR BLD: 67 %
PLATELET # BLD AUTO: 244 K/UL (ref 135–450)
PMV BLD AUTO: 10.4 FL (ref 5–10.5)
POTASSIUM SERPL-SCNC: 4.2 MMOL/L (ref 3.5–5.1)
PROT SERPL-MCNC: 6.7 G/DL (ref 6.4–8.2)
RBC # BLD AUTO: 4.41 M/UL (ref 4.2–5.9)
SODIUM SERPL-SCNC: 141 MMOL/L (ref 136–145)
T4 FREE SERPL-MCNC: 1.5 NG/DL (ref 0.9–1.8)
TSH SERPL DL<=0.005 MIU/L-ACNC: 7.25 UIU/ML (ref 0.27–4.2)
WBC # BLD AUTO: 6.5 K/UL (ref 4–11)

## 2024-08-13 RX ORDER — LEVOTHYROXINE SODIUM 0.03 MG/1
25 TABLET ORAL DAILY
Qty: 90 TABLET | Refills: 1 | Status: SHIPPED | OUTPATIENT
Start: 2024-08-13

## 2024-08-23 RX ORDER — AMIODARONE HYDROCHLORIDE 200 MG/1
200 TABLET ORAL DAILY
Qty: 90 TABLET | Refills: 0 | Status: SHIPPED | OUTPATIENT
Start: 2024-08-23

## 2024-09-06 ENCOUNTER — HOSPITAL ENCOUNTER (OUTPATIENT)
Dept: CT IMAGING | Age: 74
Discharge: HOME | End: 2024-09-06
Payer: MEDICARE

## 2024-09-06 DIAGNOSIS — R10.84 GENERALIZED ABDOMINAL PAIN: ICD-10-CM

## 2024-09-06 PROCEDURE — 74176 CT ABD & PELVIS W/O CONTRAST: CPT

## 2024-09-08 DIAGNOSIS — Q64.6 HUTCH DIVERTICULUM OF URINARY BLADDER: Primary | ICD-10-CM

## 2024-09-18 ENCOUNTER — TELEPHONE (OUTPATIENT)
Dept: CARDIOLOGY CLINIC | Age: 74
End: 2024-09-18

## 2024-09-18 ENCOUNTER — OFFICE VISIT (OUTPATIENT)
Dept: FAMILY MEDICINE CLINIC | Age: 74
End: 2024-09-18

## 2024-09-18 VITALS
DIASTOLIC BLOOD PRESSURE: 82 MMHG | SYSTOLIC BLOOD PRESSURE: 136 MMHG | HEART RATE: 51 BPM | OXYGEN SATURATION: 99 % | WEIGHT: 202.4 LBS | HEIGHT: 76 IN | BODY MASS INDEX: 24.65 KG/M2

## 2024-09-18 DIAGNOSIS — I48.91 ATRIAL FIBRILLATION WITH RVR (HCC): ICD-10-CM

## 2024-09-18 DIAGNOSIS — E78.2 MIXED HYPERLIPIDEMIA: Primary | ICD-10-CM

## 2024-09-18 DIAGNOSIS — N18.31 STAGE 3A CHRONIC KIDNEY DISEASE (CKD) (HCC): ICD-10-CM

## 2024-09-18 DIAGNOSIS — I50.42 CHRONIC COMBINED SYSTOLIC AND DIASTOLIC CONGESTIVE HEART FAILURE (HCC): ICD-10-CM

## 2024-09-18 DIAGNOSIS — Z00.00 MEDICARE ANNUAL WELLNESS VISIT, SUBSEQUENT: ICD-10-CM

## 2024-09-18 ASSESSMENT — PATIENT HEALTH QUESTIONNAIRE - PHQ9
1. LITTLE INTEREST OR PLEASURE IN DOING THINGS: NOT AT ALL
SUM OF ALL RESPONSES TO PHQ QUESTIONS 1-9: 0
SUM OF ALL RESPONSES TO PHQ9 QUESTIONS 1 & 2: 0
SUM OF ALL RESPONSES TO PHQ QUESTIONS 1-9: 0
2. FEELING DOWN, DEPRESSED OR HOPELESS: NOT AT ALL

## 2024-10-10 ENCOUNTER — TELEPHONE (OUTPATIENT)
Dept: CARDIOLOGY CLINIC | Age: 74
End: 2024-10-10

## 2024-10-10 ENCOUNTER — OFFICE VISIT (OUTPATIENT)
Dept: CARDIOLOGY CLINIC | Age: 74
End: 2024-10-10

## 2024-10-10 VITALS
HEART RATE: 53 BPM | DIASTOLIC BLOOD PRESSURE: 82 MMHG | BODY MASS INDEX: 24.77 KG/M2 | HEIGHT: 76 IN | WEIGHT: 203.4 LBS | SYSTOLIC BLOOD PRESSURE: 138 MMHG

## 2024-10-10 DIAGNOSIS — I47.19 ATRIAL TACHYCARDIA (HCC): ICD-10-CM

## 2024-10-10 DIAGNOSIS — D68.69 SECONDARY HYPERCOAGULABLE STATE (HCC): ICD-10-CM

## 2024-10-10 DIAGNOSIS — I10 ESSENTIAL HYPERTENSION: ICD-10-CM

## 2024-10-10 DIAGNOSIS — I51.3 THROMBUS OF LEFT ATRIAL APPENDAGE: ICD-10-CM

## 2024-10-10 DIAGNOSIS — I48.0 PAROXYSMAL ATRIAL FIBRILLATION (HCC): Primary | ICD-10-CM

## 2024-10-10 RX ORDER — METOPROLOL SUCCINATE 25 MG/1
12 TABLET, EXTENDED RELEASE ORAL DAILY
Qty: 90 TABLET | Refills: 0 | Status: SHIPPED | OUTPATIENT
Start: 2024-10-10

## 2024-10-10 RX ORDER — LISINOPRIL 5 MG/1
2.5 TABLET ORAL DAILY
Qty: 90 TABLET | Refills: 1 | Status: SHIPPED | OUTPATIENT
Start: 2024-10-10

## 2024-10-10 NOTE — PROGRESS NOTES
Thought Content: Thought content normal.        DATA:  Lab Results   Component Value Date    TROPONINT 0.010 (H) 03/07/2020     BNP:    Lab Results   Component Value Date    PROBNP 10,332 (H) 03/06/2020     PT/INR:  No results found for: \"PTINR\"  Lab Results   Component Value Date    LABA1C 5.6 09/18/2023     Lab Results   Component Value Date    CHOL 235 (H) 09/18/2023    TRIG 170 (H) 09/18/2023    HDL 56 09/18/2023    LDLDIRECT 99 03/10/2020     Lab Results   Component Value Date    ALT 21 08/12/2024    AST 26 08/12/2024     TSH:   Lab Results   Component Value Date    TSH 7.25 (H) 08/12/2024       Echo    12/2020   Summary   This is a limited study s/p ablation to r/o pericardial effusion.   Left ventricular systolic function is normal LVEF 50%   Mild to moderate mitral regurgitation.   There is a trivial pericardial effusion.       EKG - SINUS bradycardia, first degree AV block    Vitals:    10/10/24 1007   BP: 138/82   Site: Right Upper Arm   Position: Sitting   Cuff Size: Small Adult   Pulse: 53   Weight: 92.3 kg (203 lb 6.4 oz)   Height: 1.93 m (6' 4\")        Impression and recommendations:     Atrial tachycardia paroxysmal  and PAF sp cardioversion  Status post ablation of Persistent atrial fibrillation - PV isolation, CTI ablation, posterior wall isolation  Hypercoagulable due to paroxysmal atrial fibrillation  HTN  History of ALEJANDRA thrombis    Patient is feeling well at this time  Will continue amiodarone 200 mg daily  Patient had amiodarone labs in August by PCP  TSH was elevated at 7 however patient is on Synthroid and this is managed by his PCP  Will continue toprol xl 25 mg daily  Patient was supposed to have PFTs in May however he did not go to the appointment  We will get PFTs    Patient denies issues bleeding  Will continue eliquis 5 mg BID     Vitals:    10/10/24 1007   BP: 138/82   Pulse: 53   Patient does complain of fatigue.  We will decrease Toprol-XL to 12.5 mg daily and increase lisinopril to

## 2024-10-10 NOTE — TELEPHONE ENCOUNTER
Attempting to reach patient in order to provide PFT appointment info:  Patient is scheduled at [facility] on 11/14/24, with an arrival time of 10:00 am and a start time of 10:30 am.  No answer, no voicemail.    Patient should:  -Have no breathing medications/inhalers for 12 hours prior.  -No cologne or strong smelling aftershave  -No smoking/vaping 4 hours prior  -No alcohol/caffeinated drinks  -No exercise  -Avoid cold air exposure  -Have a light meal beforehand  -Wear loose, comfortable clothing  -Bring insurance card and photo id      Patient was advised that should the appointment need to be rescheduled, to contact Central Scheduling at [phone].

## 2024-11-14 ENCOUNTER — HOSPITAL ENCOUNTER (OUTPATIENT)
Dept: PULMONOLOGY | Age: 74
Discharge: HOME | End: 2024-11-14
Payer: MEDICARE

## 2024-11-14 DIAGNOSIS — Z79.899 ON AMIODARONE THERAPY: ICD-10-CM

## 2024-11-14 LAB
DLCO %PRED: 82 %
DLCO PRED: NORMAL
DLCO/VA %PRED: NORMAL
DLCO/VA PRED: NORMAL
DLCO/VA: NORMAL
DLCO: NORMAL
DLCO: NORMAL
EXPIRATORY TIME-POST: NORMAL
EXPIRATORY TIME: NORMAL
FEF 25-75 %CHNG: NORMAL
FEF 25-75 POST %PRED: NORMAL %
FEF 25-75% %PRED-PRE: 45 L/SEC
FEF 25-75% PRED: NORMAL
FEF 25-75-POST: NORMAL
FEF 25-75-PRE: NORMAL
FEV1 %PRED-POST: NORMAL %
FEV1 %PRED-PRE: 89 %
FEV1 PRED: NORMAL
FEV1-POST: NORMAL
FEV1-PRE: NORMAL
FEV1/FVC %PRED-POST: NORMAL %
FEV1/FVC %PRED-PRE: 83 %
FEV1/FVC PRED: NORMAL
FEV1/FVC-POST: NORMAL
FEV1/FVC-PRE: NORMAL
FEV1/FVC: NORMAL
FEV1: NORMAL
FVC %PRED-POST: NORMAL L
FVC %PRED-PRE: 106 %
FVC PRED: NORMAL
FVC-POST: NORMAL
FVC-PRE: NORMAL
FVC: NORMAL
GAW %PRED: NORMAL
GAW PRED: NORMAL
GAW: NORMAL
IC PRE %PRED: NORMAL
IC PRED: NORMAL
IC: NORMAL
MEP: NORMAL
MIP: NORMAL
MVV %PRED-PRE: NORMAL
MVV PRED: NORMAL
MVV-PRE: NORMAL
PEF %PRED-POST: NORMAL
PEF %PRED-PRE: NORMAL
PEF PRED: NORMAL
PEF%CHNG: NORMAL
PEF-POST: NORMAL
PEF-PRE: NORMAL
RAW %PRED: NORMAL
RAW PRED: NORMAL
RAW: NORMAL
RV PRE %PRED: NORMAL
RV PRED: NORMAL
RV: NORMAL
SVC %PRED: 104 %
SVC PRED: NORMAL
SVC: NORMAL
TLC PRE %PRED: NORMAL
TLC PRED: NORMAL
TLC: NORMAL
TLC: NORMAL
VA %PRED: NORMAL
VA PRED: NORMAL
VA: NORMAL
VTG %PRED: NORMAL
VTG PRED: NORMAL
VTG: NORMAL

## 2024-11-14 PROCEDURE — 94729 DIFFUSING CAPACITY: CPT

## 2024-11-14 PROCEDURE — 94010 BREATHING CAPACITY TEST: CPT

## 2024-11-14 ASSESSMENT — PULMONARY FUNCTION TESTS
FVC_PERCENT_PREDICTED_PRE: 106
FEV1_PERCENT_PREDICTED_PRE: 89
FEV1/FVC_PERCENT_PREDICTED_PRE: 83

## 2024-11-15 ENCOUNTER — TELEPHONE (OUTPATIENT)
Dept: CARDIOLOGY CLINIC | Age: 74
End: 2024-11-15

## 2024-11-15 NOTE — TELEPHONE ENCOUNTER
Pt is calling in with medication questions. He states Lorraine wrote the lisinopril for 5mg and to break in half an dtake 2.5mg once daily. He is wanting her to call in 2.5mg tablet instead of the 5mg. Does not want to cut pill every day. Also stated she was either going to increase the Lisinopril or Metoprolol. He does not see an increase. Call to advise [phone]

## 2024-11-15 NOTE — TELEPHONE ENCOUNTER
Called Patient to inform Lorraine Hawkins NP can send over a RX for Lisinopril 2.5mg.    Unable to LM to inform patient.

## 2024-11-26 RX ORDER — AMIODARONE HYDROCHLORIDE 200 MG/1
200 TABLET ORAL DAILY
Qty: 90 TABLET | Refills: 1 | Status: SHIPPED | OUTPATIENT
Start: 2024-11-26

## 2025-01-03 RX ORDER — METOPROLOL SUCCINATE 25 MG/1
25 TABLET, EXTENDED RELEASE ORAL DAILY
Qty: 90 TABLET | Refills: 0 | Status: SHIPPED | OUTPATIENT
Start: 2025-01-03

## 2025-02-03 DIAGNOSIS — E03.8 SUBCLINICAL HYPOTHYROIDISM: ICD-10-CM

## 2025-02-03 PROCEDURE — 36415 COLL VENOUS BLD VENIPUNCTURE: CPT | Performed by: FAMILY MEDICINE

## 2025-02-03 RX ORDER — LEVOTHYROXINE SODIUM 25 UG/1
25 TABLET ORAL DAILY
Qty: 30 TABLET | Refills: 0 | Status: SHIPPED | OUTPATIENT
Start: 2025-02-03

## 2025-02-04 RX ORDER — LEVOTHYROXINE SODIUM 25 UG/1
25 TABLET ORAL DAILY
Qty: 90 TABLET | Refills: 0 | OUTPATIENT
Start: 2025-02-04

## 2025-02-25 ENCOUNTER — LAB (OUTPATIENT)
Dept: FAMILY MEDICINE CLINIC | Age: 75
End: 2025-02-25
Payer: MEDICARE

## 2025-02-25 DIAGNOSIS — E03.8 SUBCLINICAL HYPOTHYROIDISM: Primary | ICD-10-CM

## 2025-02-25 PROCEDURE — 36415 COLL VENOUS BLD VENIPUNCTURE: CPT | Performed by: FAMILY MEDICINE

## 2025-02-26 DIAGNOSIS — K21.9 GASTROESOPHAGEAL REFLUX DISEASE WITHOUT ESOPHAGITIS: ICD-10-CM

## 2025-02-26 LAB
T4 FREE SERPL-MCNC: 1.5 NG/DL (ref 0.9–1.8)
TSH SERPL DL<=0.005 MIU/L-ACNC: 4.99 UIU/ML (ref 0.27–4.2)

## 2025-02-26 RX ORDER — PANTOPRAZOLE SODIUM 40 MG/1
40 TABLET, DELAYED RELEASE ORAL DAILY
Qty: 90 TABLET | Refills: 2 | Status: SHIPPED | OUTPATIENT
Start: 2025-02-26

## 2025-03-04 ENCOUNTER — OFFICE VISIT (OUTPATIENT)
Dept: FAMILY MEDICINE CLINIC | Age: 75
End: 2025-03-04
Payer: MEDICARE

## 2025-03-04 VITALS
OXYGEN SATURATION: 98 % | WEIGHT: 213.2 LBS | BODY MASS INDEX: 25.95 KG/M2 | SYSTOLIC BLOOD PRESSURE: 126 MMHG | HEART RATE: 52 BPM | DIASTOLIC BLOOD PRESSURE: 80 MMHG

## 2025-03-04 DIAGNOSIS — E03.8 SUBCLINICAL HYPOTHYROIDISM: ICD-10-CM

## 2025-03-04 DIAGNOSIS — J31.0 GUSTATORY RHINITIS: ICD-10-CM

## 2025-03-04 DIAGNOSIS — I50.42 CHRONIC COMBINED SYSTOLIC AND DIASTOLIC CONGESTIVE HEART FAILURE (HCC): ICD-10-CM

## 2025-03-04 DIAGNOSIS — J30.0 VASOMOTOR RHINITIS: Primary | ICD-10-CM

## 2025-03-04 DIAGNOSIS — N18.31 STAGE 3A CHRONIC KIDNEY DISEASE (CKD) (HCC): ICD-10-CM

## 2025-03-04 DIAGNOSIS — I48.0 PAROXYSMAL ATRIAL FIBRILLATION (HCC): ICD-10-CM

## 2025-03-04 PROBLEM — I48.19 PERSISTENT ATRIAL FIBRILLATION (HCC): Status: RESOLVED | Noted: 2020-03-16 | Resolved: 2025-03-04

## 2025-03-04 PROBLEM — I48.91 ATRIAL FIBRILLATION WITH RVR (HCC): Status: RESOLVED | Noted: 2020-03-06 | Resolved: 2025-03-04

## 2025-03-04 PROCEDURE — 1123F ACP DISCUSS/DSCN MKR DOCD: CPT | Performed by: FAMILY MEDICINE

## 2025-03-04 PROCEDURE — G8419 CALC BMI OUT NRM PARAM NOF/U: HCPCS | Performed by: FAMILY MEDICINE

## 2025-03-04 PROCEDURE — 1036F TOBACCO NON-USER: CPT | Performed by: FAMILY MEDICINE

## 2025-03-04 PROCEDURE — 99214 OFFICE O/P EST MOD 30 MIN: CPT | Performed by: FAMILY MEDICINE

## 2025-03-04 PROCEDURE — 3079F DIAST BP 80-89 MM HG: CPT | Performed by: FAMILY MEDICINE

## 2025-03-04 PROCEDURE — 1159F MED LIST DOCD IN RCRD: CPT | Performed by: FAMILY MEDICINE

## 2025-03-04 PROCEDURE — 3017F COLORECTAL CA SCREEN DOC REV: CPT | Performed by: FAMILY MEDICINE

## 2025-03-04 PROCEDURE — 3074F SYST BP LT 130 MM HG: CPT | Performed by: FAMILY MEDICINE

## 2025-03-04 PROCEDURE — G8427 DOCREV CUR MEDS BY ELIG CLIN: HCPCS | Performed by: FAMILY MEDICINE

## 2025-03-04 RX ORDER — FLUTICASONE PROPIONATE 50 MCG
2 SPRAY, SUSPENSION (ML) NASAL DAILY
Qty: 16 G | Refills: 5 | Status: SHIPPED | OUTPATIENT
Start: 2025-03-04

## 2025-03-04 RX ORDER — LEVOTHYROXINE SODIUM 50 UG/1
50 TABLET ORAL DAILY
Qty: 90 TABLET | Refills: 1 | Status: SHIPPED | OUTPATIENT
Start: 2025-03-04

## 2025-03-04 SDOH — ECONOMIC STABILITY: FOOD INSECURITY: WITHIN THE PAST 12 MONTHS, THE FOOD YOU BOUGHT JUST DIDN'T LAST AND YOU DIDN'T HAVE MONEY TO GET MORE.: NEVER TRUE

## 2025-03-04 SDOH — ECONOMIC STABILITY: FOOD INSECURITY: WITHIN THE PAST 12 MONTHS, YOU WORRIED THAT YOUR FOOD WOULD RUN OUT BEFORE YOU GOT MONEY TO BUY MORE.: NEVER TRUE

## 2025-03-04 ASSESSMENT — PATIENT HEALTH QUESTIONNAIRE - PHQ9
SUM OF ALL RESPONSES TO PHQ QUESTIONS 1-9: 0
2. FEELING DOWN, DEPRESSED OR HOPELESS: NOT AT ALL
SUM OF ALL RESPONSES TO PHQ QUESTIONS 1-9: 0
1. LITTLE INTEREST OR PLEASURE IN DOING THINGS: NOT AT ALL
SUM OF ALL RESPONSES TO PHQ QUESTIONS 1-9: 0
SUM OF ALL RESPONSES TO PHQ QUESTIONS 1-9: 0

## 2025-03-04 NOTE — PROGRESS NOTES
3/4/25    Dave Wiseman  1950    MAXIMO Acosta is a 74 y.o. male who presents today for evaluation of:  Chief Complaint   Patient presents with    Other     Excess mucus drainage mainly in morning,     Results     Reviewed labs patient would like to increase levothyroxine           History of Present Illness  The patient is a 74-year-old male who presents for evaluation of sinus drainage, atrial fibrillation, hypothyroidism, and health insurance issues.    He reports experiencing sinus drainage, which he suspects may be a side effect of his pantoprazole medication, even though he does not experience heartburn. He has not previously used any nasal sprays such as Nasonex or Flonase. He also reports a decrease in his appetite. He experiences coughing episodes lasting between 5 to 10 minutes, particularly after meals, and notes that the mucus appears to originate from his sinuses rather than his stomach.    He has been diagnosed with paroxysmal atrial fibrillation, which has been well-managed with amiodarone for the past year. He reports no leg swelling but does experience shortness of breath during physical exertion, such as walking his dog. Despite this, he is able to walk several miles, although with some fatigue afterwards.    He has expressed interest in increasing his thyroid medication dosage due to perceived low energy levels.    He is having issues with his health insurance. He was informed by [employer] that he no longer has a plan with them. He called and spoke with several people before finally speaking with a salesperson who tried to get him a new plan. The salesperson said it was Medicare's decision to terminate his plan with [employer]. He is unsure why Medicare would make that decision. He did not make any decisions regarding his plan. He called the salesperson who originally sold him the Medicare Advantage plan through [employer], and she confirmed that it was

## 2025-03-05 ENCOUNTER — TELEPHONE (OUTPATIENT)
Dept: CARDIOLOGY CLINIC | Age: 75
End: 2025-03-05

## 2025-03-05 DIAGNOSIS — E03.8 SUBCLINICAL HYPOTHYROIDISM: ICD-10-CM

## 2025-03-05 RX ORDER — METOPROLOL SUCCINATE 25 MG/1
12.5 TABLET, EXTENDED RELEASE ORAL DAILY
Qty: 90 TABLET | Refills: 1 | Status: SHIPPED | OUTPATIENT
Start: 2025-03-05

## 2025-03-05 RX ORDER — LEVOTHYROXINE SODIUM 25 UG/1
25 TABLET ORAL DAILY
Qty: 30 TABLET | Refills: 0 | OUTPATIENT
Start: 2025-03-05

## 2025-03-05 RX ORDER — LISINOPRIL 5 MG/1
5 TABLET ORAL DAILY
Qty: 90 TABLET | Refills: 1 | Status: SHIPPED | OUTPATIENT
Start: 2025-03-05

## 2025-03-05 NOTE — TELEPHONE ENCOUNTER
Advised patient I do not know this, but that he could also get a pill splitter at any local pharmacy. Patient stated understanding.

## 2025-03-05 NOTE — TELEPHONE ENCOUNTER
Patient called to discuss Metoprolol dose   He was told we were going cut one of his   Medications in half , He wants to make sure he  Is taking his medications correctly.

## 2025-03-05 NOTE — TELEPHONE ENCOUNTER
Called patient and advised per last note with Lorraine he was to decrease metoprolol XL is 12.5 mg daily and Lisinopril 5 mg daily. New RX sent to pharmacy. Spoke with Lorraine and she verified verbally the changes that where made at last visit. Also advised patient that is PFT was normal no obstructive diease process per Lorraine.

## 2025-03-24 RX ORDER — APIXABAN 5 MG/1
5 TABLET, FILM COATED ORAL 2 TIMES DAILY
Qty: 180 TABLET | Refills: 0 | Status: SHIPPED | OUTPATIENT
Start: 2025-03-24

## 2025-04-18 ENCOUNTER — OFFICE VISIT (OUTPATIENT)
Age: 75
End: 2025-04-18
Payer: MEDICARE

## 2025-04-18 VITALS
SYSTOLIC BLOOD PRESSURE: 118 MMHG | DIASTOLIC BLOOD PRESSURE: 62 MMHG | HEIGHT: 76 IN | HEART RATE: 51 BPM | WEIGHT: 211.4 LBS | BODY MASS INDEX: 25.74 KG/M2

## 2025-04-18 DIAGNOSIS — Z86.79 STATUS POST ABLATION OF ATRIAL FIBRILLATION: ICD-10-CM

## 2025-04-18 DIAGNOSIS — I51.3 THROMBUS OF LEFT ATRIAL APPENDAGE: ICD-10-CM

## 2025-04-18 DIAGNOSIS — I47.19 ATRIAL TACHYCARDIA: Primary | ICD-10-CM

## 2025-04-18 DIAGNOSIS — D68.69 SECONDARY HYPERCOAGULABLE STATE: ICD-10-CM

## 2025-04-18 DIAGNOSIS — I10 ESSENTIAL HYPERTENSION: ICD-10-CM

## 2025-04-18 DIAGNOSIS — Z98.890 STATUS POST ABLATION OF ATRIAL FIBRILLATION: ICD-10-CM

## 2025-04-18 DIAGNOSIS — R06.02 SHORTNESS OF BREATH: ICD-10-CM

## 2025-04-18 PROCEDURE — 3078F DIAST BP <80 MM HG: CPT | Performed by: NURSE PRACTITIONER

## 2025-04-18 PROCEDURE — 1159F MED LIST DOCD IN RCRD: CPT | Performed by: NURSE PRACTITIONER

## 2025-04-18 PROCEDURE — 93000 ELECTROCARDIOGRAM COMPLETE: CPT | Performed by: NURSE PRACTITIONER

## 2025-04-18 PROCEDURE — 1123F ACP DISCUSS/DSCN MKR DOCD: CPT | Performed by: NURSE PRACTITIONER

## 2025-04-18 PROCEDURE — 3074F SYST BP LT 130 MM HG: CPT | Performed by: NURSE PRACTITIONER

## 2025-04-18 PROCEDURE — 99214 OFFICE O/P EST MOD 30 MIN: CPT | Performed by: NURSE PRACTITIONER

## 2025-04-18 RX ORDER — LISINOPRIL 2.5 MG/1
2.5 TABLET ORAL DAILY
Qty: 90 TABLET | Refills: 1 | Status: SHIPPED | OUTPATIENT
Start: 2025-04-18

## 2025-04-18 RX ORDER — METOPROLOL SUCCINATE 25 MG/1
25 TABLET, EXTENDED RELEASE ORAL DAILY
Qty: 90 TABLET | Refills: 1 | Status: SHIPPED | OUTPATIENT
Start: 2025-04-18

## 2025-04-18 RX ORDER — AMIODARONE HYDROCHLORIDE 200 MG/1
200 TABLET ORAL DAILY
Qty: 90 TABLET | Refills: 1 | Status: SHIPPED | OUTPATIENT
Start: 2025-04-18

## 2025-04-18 NOTE — PROGRESS NOTES
Subclinical hypothyroidism 09/18/2023    UTI (urinary tract infection)     week 7/20/2020- was on antibiotic- had repeat urine test 7/27/2020 but never heard the results\"    Wears glasses      Past Surgical History:   Procedure Laterality Date    ARM SURGERY Left 1981    \"have plate and screws still in place\"    CARDIAC CATHETERIZATION      per old chart had cath 3/2020    CARDIOVERSION  05/2020    \"had cardioversion- worked for some time but the atrial fib is back\"    CARDIOVERSION N/A 07/15/2021    Successful SEEMA/CV performed by Dr. Hughes.    COLONOSCOPY  2010    EYE SURGERY  2015    left eye cataract ext     TURP N/A 08/06/2020    CYSTOSCOPY W/BIPOLAR TURP REMOVAL OF BLADDER STONES performed by Skyler Purcell MD at [facility] OR     Family History   Problem Relation Age of Onset    Cancer Mother         thyroid cancer    Cancer Father         lymphoma    Alzheimer's Disease Maternal Aunt      Social History     Tobacco Use    Smoking status: Never    Smokeless tobacco: Never   Substance Use Topics    Alcohol use: Not on file     Comment: \"drink 1-2 beers per day\"        Review of Systems   Constitution: Negative for diaphoresis and malaise/positive for fatigue  HENT: Negative for congestion, hoarse voice and tinnitus.    Eyes: Negative for pain and visual disturbance.   Cardiovascular: Negative for chest pain, positive dyspnea on exertion, positive irregular heartbeat, negative leg swelling, negative near-syncope,   Respiratory: Negative for cough. Positive shortness of breath.    Endocrine: Negative for cold intolerance and heat intolerance.   Hematologic/Lymphatic: Negative for adenopathy and bleeding problem.   Skin: Negative for color change.  Musculoskeletal: Positive for arthritis. Negative for muscle weakness.   Gastrointestinal: Negative for abdominal pain and melena.   Genitourinary: Negative for flank pain and hematuria.   Neurological: Positive for dizziness and light-headedness.   Psychiatric/Behavioral:

## 2025-04-22 ENCOUNTER — OFFICE VISIT (OUTPATIENT)
Dept: FAMILY MEDICINE CLINIC | Age: 75
End: 2025-04-22
Payer: MEDICARE

## 2025-04-22 VITALS
WEIGHT: 212.2 LBS | BODY MASS INDEX: 25.83 KG/M2 | SYSTOLIC BLOOD PRESSURE: 106 MMHG | HEART RATE: 56 BPM | OXYGEN SATURATION: 98 % | DIASTOLIC BLOOD PRESSURE: 62 MMHG

## 2025-04-22 DIAGNOSIS — R25.1 TREMOR: Primary | ICD-10-CM

## 2025-04-22 DIAGNOSIS — I10 ESSENTIAL HYPERTENSION: ICD-10-CM

## 2025-04-22 DIAGNOSIS — Z77.29 TOXIN EXPOSURE: ICD-10-CM

## 2025-04-22 DIAGNOSIS — R41.3 MEMORY IMPAIRMENT: ICD-10-CM

## 2025-04-22 DIAGNOSIS — E03.8 SUBCLINICAL HYPOTHYROIDISM: ICD-10-CM

## 2025-04-22 DIAGNOSIS — I50.42 CHRONIC COMBINED SYSTOLIC AND DIASTOLIC CONGESTIVE HEART FAILURE (HCC): ICD-10-CM

## 2025-04-22 PROCEDURE — 3074F SYST BP LT 130 MM HG: CPT | Performed by: FAMILY MEDICINE

## 2025-04-22 PROCEDURE — 1123F ACP DISCUSS/DSCN MKR DOCD: CPT | Performed by: FAMILY MEDICINE

## 2025-04-22 PROCEDURE — 3078F DIAST BP <80 MM HG: CPT | Performed by: FAMILY MEDICINE

## 2025-04-22 PROCEDURE — 99214 OFFICE O/P EST MOD 30 MIN: CPT | Performed by: FAMILY MEDICINE

## 2025-04-22 PROCEDURE — 36415 COLL VENOUS BLD VENIPUNCTURE: CPT | Performed by: FAMILY MEDICINE

## 2025-04-22 PROCEDURE — 1159F MED LIST DOCD IN RCRD: CPT | Performed by: FAMILY MEDICINE

## 2025-04-22 NOTE — PROGRESS NOTES
systolic and diastolic congestive heart failure (HCC)       Assessment & Plan  1.  Tremor  - Reports a perceived decline in balance, accompanied by intermittent hand tremors, particularly noticeable when reaching for objects such as a coffee cup from the microwave. Tremors are not constant and can be mitigated by consciously steadying the hand prior to reaching.  - Notes progressive deterioration in handwriting over the past 20 years, which has become more pronounced recently. Handwriting has not become smaller, but experiences spasticity in the hand during signature writing. Able to print decently.  - No family history of Parkinson's disease. Symptoms do not align with a diagnosis of Parkinson's disease. The most prevalent type of tremor is essential tremor, which has limited treatment options.  - If the condition worsens significantly, beta blocker medications may provide some relief.  I will order blood tests that will check thyroid and vitamin B12 and folate.    2. Memory issues.  - Describes a sensation of the brain rapidly shifting between thoughts upon waking at night, which eventually subsides. Experiences mild anxiety and worry, particularly about the future, but does not consider it severe.  - Acknowledges minor difficulties in organization and word-finding but maintains that he does not get lost in familiar areas. Cognitive function appears to fluctuate, with some days marked by clear articulation and well-organized thoughts, while on other days, struggles with word-finding.  - Increased forgetfulness compared to younger years. Engaging in weightlifting exercises for the past 3 months and reads for 1 to 2 hours daily, primarily focusing on politics.  - Advised to maintain physical activity and engage in strength exercises such as weightlifting. Encouraged to diversify reading material and explore new interests.    3. Potential Agent Orange exposure.  - Exposed to Agent Orange during service in [place] and  no

## 2025-04-23 ENCOUNTER — RESULTS FOLLOW-UP (OUTPATIENT)
Dept: FAMILY MEDICINE CLINIC | Age: 75
End: 2025-04-23

## 2025-04-23 DIAGNOSIS — E03.8 SUBCLINICAL HYPOTHYROIDISM: ICD-10-CM

## 2025-04-23 LAB
ALBUMIN SERPL-MCNC: 4.3 G/DL (ref 3.4–5)
ALBUMIN/GLOB SERPL: 1.7 {RATIO} (ref 1.1–2.2)
ALP SERPL-CCNC: 65 U/L (ref 40–129)
ALT SERPL-CCNC: 35 U/L (ref 10–40)
ANION GAP SERPL CALCULATED.3IONS-SCNC: 12 MMOL/L (ref 3–16)
AST SERPL-CCNC: 36 U/L (ref 15–37)
BASOPHILS # BLD: 0 K/UL (ref 0–0.2)
BASOPHILS NFR BLD: 0.6 %
BILIRUB SERPL-MCNC: 0.6 MG/DL (ref 0–1)
BUN SERPL-MCNC: 18 MG/DL (ref 7–20)
CALCIUM SERPL-MCNC: 9.3 MG/DL (ref 8.3–10.6)
CHLORIDE SERPL-SCNC: 102 MMOL/L (ref 99–110)
CHOLEST SERPL-MCNC: 186 MG/DL (ref 0–199)
CO2 SERPL-SCNC: 24 MMOL/L (ref 21–32)
CREAT SERPL-MCNC: 1.5 MG/DL (ref 0.8–1.3)
DEPRECATED RDW RBC AUTO: 14.3 % (ref 12.4–15.4)
EOSINOPHIL # BLD: 0.1 K/UL (ref 0–0.6)
EOSINOPHIL NFR BLD: 1.9 %
FOLATE SERPL-MCNC: 19.7 NG/ML (ref 4.78–24.2)
GFR SERPLBLD CREATININE-BSD FMLA CKD-EPI: 48 ML/MIN/{1.73_M2}
GLUCOSE SERPL-MCNC: 106 MG/DL (ref 70–99)
HCT VFR BLD AUTO: 41.7 % (ref 40.5–52.5)
HDLC SERPL-MCNC: 60 MG/DL (ref 40–60)
HGB BLD-MCNC: 14.3 G/DL (ref 13.5–17.5)
LDLC SERPL CALC-MCNC: 106 MG/DL
LYMPHOCYTES # BLD: 1.7 K/UL (ref 1–5.1)
LYMPHOCYTES NFR BLD: 26.1 %
MCH RBC QN AUTO: 31.4 PG (ref 26–34)
MCHC RBC AUTO-ENTMCNC: 34.3 G/DL (ref 31–36)
MCV RBC AUTO: 91.4 FL (ref 80–100)
MONOCYTES # BLD: 0.4 K/UL (ref 0–1.3)
MONOCYTES NFR BLD: 6 %
NEUTROPHILS # BLD: 4.2 K/UL (ref 1.7–7.7)
NEUTROPHILS NFR BLD: 65.4 %
PLATELET # BLD AUTO: 214 K/UL (ref 135–450)
PMV BLD AUTO: 10.7 FL (ref 5–10.5)
POTASSIUM SERPL-SCNC: 4.5 MMOL/L (ref 3.5–5.1)
PROT SERPL-MCNC: 6.8 G/DL (ref 6.4–8.2)
RBC # BLD AUTO: 4.57 M/UL (ref 4.2–5.9)
SODIUM SERPL-SCNC: 138 MMOL/L (ref 136–145)
TRIGL SERPL-MCNC: 102 MG/DL (ref 0–150)
TSH SERPL DL<=0.005 MIU/L-ACNC: 4.14 UIU/ML (ref 0.27–4.2)
VIT B12 SERPL-MCNC: 335 PG/ML (ref 211–911)
VLDLC SERPL CALC-MCNC: 20 MG/DL
WBC # BLD AUTO: 6.4 K/UL (ref 4–11)

## 2025-04-23 RX ORDER — LEVOTHYROXINE SODIUM 50 UG/1
50 TABLET ORAL DAILY
Qty: 90 TABLET | Refills: 3 | Status: SHIPPED | OUTPATIENT
Start: 2025-04-23

## 2025-05-07 ENCOUNTER — TELEPHONE (OUTPATIENT)
Dept: FAMILY MEDICINE CLINIC | Age: 75
End: 2025-05-07

## 2025-05-07 NOTE — TELEPHONE ENCOUNTER
Patient stated he was bit by 2 ticks int he last couple days. He stated one has devleoped a red area. Patient offered appointments for today and tomorrow but opted to come in on 05/12/2025. He would like to know if it is important to come in sooner for this.

## 2025-06-11 ENCOUNTER — TELEPHONE (OUTPATIENT)
Dept: CARDIOLOGY CLINIC | Age: 75
End: 2025-06-11

## 2025-06-11 NOTE — TELEPHONE ENCOUNTER
Called Patient to inform of Echo results, Per Lorraine Hawkins NP and to advise scheduling appointment with Cardiology for Patient. Patient did not answer and voicemail is full so I was not able to leave a message requesting a call back to office.    Will try again later to contact Patient.

## 2025-06-12 NOTE — TELEPHONE ENCOUNTER
Spoke with Patient and advised him to schedule consult with Cardiology to discuss echo results; Patient would like to see. Dr. Ayala, as Patient stated he was not pleased with treatment/care from Dr. Dubois. Consult with Dr. Ayala scheduled for 7/8/25 @ 900am.    Patient advised understanding.

## 2025-06-17 DIAGNOSIS — E78.2 MIXED HYPERLIPIDEMIA: ICD-10-CM

## 2025-06-17 RX ORDER — ROSUVASTATIN CALCIUM 10 MG/1
10 TABLET, COATED ORAL DAILY
Qty: 90 TABLET | Refills: 0 | OUTPATIENT
Start: 2025-06-17

## 2025-07-08 ENCOUNTER — INITIAL CONSULT (OUTPATIENT)
Dept: CARDIOLOGY CLINIC | Age: 75
End: 2025-07-08
Payer: MEDICARE

## 2025-07-08 VITALS
SYSTOLIC BLOOD PRESSURE: 122 MMHG | WEIGHT: 201.6 LBS | DIASTOLIC BLOOD PRESSURE: 80 MMHG | BODY MASS INDEX: 24.55 KG/M2 | HEART RATE: 56 BPM | HEIGHT: 76 IN

## 2025-07-08 DIAGNOSIS — I34.0 NONRHEUMATIC MITRAL VALVE REGURGITATION: ICD-10-CM

## 2025-07-08 DIAGNOSIS — I48.0 PAROXYSMAL ATRIAL FIBRILLATION (HCC): ICD-10-CM

## 2025-07-08 DIAGNOSIS — I10 ESSENTIAL HYPERTENSION: ICD-10-CM

## 2025-07-08 DIAGNOSIS — I50.42 CHRONIC COMBINED SYSTOLIC AND DIASTOLIC CONGESTIVE HEART FAILURE (HCC): ICD-10-CM

## 2025-07-08 DIAGNOSIS — I47.19 ATRIAL TACHYCARDIA: Primary | ICD-10-CM

## 2025-07-08 DIAGNOSIS — I51.3 THROMBUS OF LEFT ATRIAL APPENDAGE: ICD-10-CM

## 2025-07-08 DIAGNOSIS — I42.8 NONISCHEMIC CARDIOMYOPATHY (HCC): ICD-10-CM

## 2025-07-08 DIAGNOSIS — E78.2 MIXED HYPERLIPIDEMIA: ICD-10-CM

## 2025-07-08 DIAGNOSIS — R01.1 HEART MURMUR: ICD-10-CM

## 2025-07-08 DIAGNOSIS — N18.31 STAGE 3A CHRONIC KIDNEY DISEASE (CKD) (HCC): ICD-10-CM

## 2025-07-08 DIAGNOSIS — Z86.39 HISTORY OF HYPERTHYROIDISM: ICD-10-CM

## 2025-07-08 PROCEDURE — 1159F MED LIST DOCD IN RCRD: CPT | Performed by: INTERNAL MEDICINE

## 2025-07-08 PROCEDURE — 3079F DIAST BP 80-89 MM HG: CPT | Performed by: INTERNAL MEDICINE

## 2025-07-08 PROCEDURE — 1123F ACP DISCUSS/DSCN MKR DOCD: CPT | Performed by: INTERNAL MEDICINE

## 2025-07-08 PROCEDURE — 99204 OFFICE O/P NEW MOD 45 MIN: CPT | Performed by: INTERNAL MEDICINE

## 2025-07-08 PROCEDURE — 3074F SYST BP LT 130 MM HG: CPT | Performed by: INTERNAL MEDICINE

## 2025-07-08 NOTE — PROGRESS NOTES
CARDIOLOGY  NOTE    Chief Complaint: Mitral regurgitation    HPI:   Dave is a 75 y.o. year old who has Past medical history as noted below.  He has history of atrial fibrillation status post cardioversion and ablation followed by recurrence of A-fib hence on amiodarone and anticoagulation  History of Present Illness  The patient is a 75-year-old male who presents for evaluation of shortness of breath.    He reports no significant changes in his condition over the past week. He experiences intermittent shortness of breath, which has been a consistent symptom for some time. This symptom occasionally manifests during physical exertion, such as exercise. He believes his quality of life is fairly goodHe does not monitor his blood pressure regularly but recalls the highest reading being around 135 systolic. He is not currently taking any diuretics. His current medication regimen includes lisinopril, amiodarone, metoprolol, and anticoagulants.    He has a history of heart failure, diagnosed approximately 5 years ago, at that time his EF was 10% cardiac cath revealed no significant obstructive coronary artery disease in 2020.  After medical management his EF improved he has not had any more episodes of heart failure admissions with no recent episodes. He has experienced atrial fibrillation in the past, which was successfully managed with amiodarone.   Echo in May 2025 showed improved EF of 55% but moderate aortic regurgitation and moderate to severe mitral regurgitation           Current Outpatient Medications   Medication Sig Dispense Refill    levothyroxine (SYNTHROID) 50 MCG tablet Take 1 tablet by mouth daily 90 tablet 3    lisinopril (PRINIVIL;ZESTRIL) 2.5 MG tablet Take 1 tablet by mouth daily 90 tablet 1    amiodarone (CORDARONE) 200 MG tablet Take 1 tablet by mouth daily 90 tablet 1    apixaban (ELIQUIS) 5 MG TABS tablet Take 1 tablet by mouth 2 times daily 180 tablet 1

## 2025-07-08 NOTE — PROGRESS NOTES
CLINICAL STAFF DOCUMENTATION    Dr. Kelly Acosta JOSEPH Ivone  1950  [phone]    Have you had any Chest Pain recently? - No    Have you had any Shortness of Breath - Yes, with exertion, mainly playing golf, pt states it comes ans goes.    Have you had any dizziness - No    Have you had any palpitations recently? - Patient states sometimes he will wake up from sleep with palpitations.     Do you have any edema - swelling in No        Is the patient on any of the following medications - \"Amiodarone (Cordarone- PT DENIES EKG, STATES HE DOES NOT SEE A REASON FOR ONE TODAY.   If Yes DO EKG - Needs done every 3 months    When did you have your last labs drawn 4/2025  What doctor ordered PCP  Do we have the labs in their chart Yes  If we do not have the labs, ask where they were drawn     If we do not have these labs, you are retrieve these labs for the provider!    Do you need any prescriptions refilled? - No    Do you have a surgery or procedure scheduled in the near future - No      Do use tobacco products? - No  Do you drink alcohol? - No  Do you use any illicit drugs? - No  Caffeine? - No      Check medication list thoroughly!!! AND RECONCILE OUTSIDE MEDICATIONS  If dose has changed change the entire order not just the MG  BE SURE TO ASK PATIENT IF THEY NEED MEDICATION REFILLS  Verify Pharmacy and update if incorrect    Add to every patient's \"wrap up\" the following dot phrase AFTERVISITCARDIOHEARTHOUSE and ensure we explain this to our patients

## 2025-08-25 DIAGNOSIS — E78.2 MIXED HYPERLIPIDEMIA: ICD-10-CM

## 2025-08-25 RX ORDER — ROSUVASTATIN CALCIUM 10 MG/1
10 TABLET, COATED ORAL DAILY
Qty: 90 TABLET | Refills: 0 | Status: SHIPPED | OUTPATIENT
Start: 2025-08-25

## (undated) DEVICE — GLOVE SURG SZ 7 CRM LTX FREE POLYISOPRENE POLYMER BEAD ANTI

## (undated) DEVICE — Z INACTIVE USE 2635504 SOLUTION IRRIG 3000ML 1.5% GL USP UROMATIC CONT

## (undated) DEVICE — CATHETER,FOLEY,3WAY,SILI-ELAST,26FR,30ML: Brand: MEDLINE

## (undated) DEVICE — CONTAINER,SPECIMEN,OR STERILE,4OZ: Brand: MEDLINE

## (undated) DEVICE — NEEDLE HYPO 18GA L1.5IN PNK POLYPR HUB S STL REG BVL STR

## (undated) DEVICE — NEEDLE HYPO 25GA L1.5IN BLU POLYPR HUB S STL REG BVL STR

## (undated) DEVICE — GOWN,SIRUS,POLYRNF,BRTHSLV,XLN/XL,20/CS: Brand: MEDLINE

## (undated) DEVICE — SET IRRIG L94IN ID0.281IN W/ 4.5IN DST FLX CONN 2 LD ON OFF

## (undated) DEVICE — NEEDLE HYPO 20GA L1.5IN YEL POLYPR HUB S STL REG BVL STR

## (undated) DEVICE — ANESTHESIA CIRCUIT ADULT-LF: Brand: MEDLINE INDUSTRIES, INC.

## (undated) DEVICE — 3M™ RANGER™ FLUID WARMING IRRIGATION SET, 24750, 10/CASE: Brand: 3M™ RANGER™

## (undated) DEVICE — GLOVE SURG SZ 65 CRM LTX FREE POLYISOPRENE POLYMER BEAD ANTI

## (undated) DEVICE — GLOVE SURG SZ 75 L12IN THK75MIL DK GRN LTX FREE

## (undated) DEVICE — GLOVE SURG SZ 75 L12IN FNGR THK79MIL GRN LTX FREE

## (undated) DEVICE — GLOVE ORANGE PI 7 1/2   MSG9075

## (undated) DEVICE — LINER SUCT CANSTR 1500CC SEMI RIG W/ POR HYDROPHOBIC SHUT

## (undated) DEVICE — GOWN,ECLIPSE,POLYRNF,BRTHSLV,L,30/CS: Brand: MEDLINE

## (undated) DEVICE — Z INACTIVE USE 2660664 SOLUTION IRRIG 3000ML 0.9% SOD CHL USP UROMATIC PLAS CONT

## (undated) DEVICE — FILTER-VIRAL/BACTERIAL-LF: Brand: MEDLINE INDUSTRIES, INC.

## (undated) DEVICE — BLADE LARYNSCP SZ 3 ENH DIR INTUB GLIDESCOPE MCGRATH MAC

## (undated) DEVICE — LINER,SEMI-RIGID,3000CC,50EA/CS: Brand: MEDLINE

## (undated) DEVICE — NEEDLE HYPO 25GA L0.625IN BLU POLYPR HUB S STL REG BVL STR

## (undated) DEVICE — DIP TUBE, 8": Brand: MEDI-VAC

## (undated) DEVICE — GOWN,REINFRCE,POLY,ECLIPSE,IMP SLV,XXLG: Brand: MEDLINE

## (undated) DEVICE — GLOVE ORANGE PI 7   MSG9070